# Patient Record
Sex: FEMALE | Race: WHITE | NOT HISPANIC OR LATINO | Employment: OTHER | URBAN - NONMETROPOLITAN AREA
[De-identification: names, ages, dates, MRNs, and addresses within clinical notes are randomized per-mention and may not be internally consistent; named-entity substitution may affect disease eponyms.]

---

## 2018-04-06 ENCOUNTER — APPOINTMENT (OUTPATIENT)
Dept: GENERAL RADIOLOGY | Facility: HOSPITAL | Age: 73
End: 2018-04-06

## 2018-04-06 ENCOUNTER — HOSPITAL ENCOUNTER (INPATIENT)
Facility: HOSPITAL | Age: 73
LOS: 7 days | Discharge: NURSING, REHAB OR LONG-TERM CARE | End: 2018-04-13
Attending: EMERGENCY MEDICINE | Admitting: HOSPITALIST

## 2018-04-06 ENCOUNTER — APPOINTMENT (OUTPATIENT)
Dept: CT IMAGING | Facility: HOSPITAL | Age: 73
End: 2018-04-06

## 2018-04-06 DIAGNOSIS — S42.352A CLOSED DISPLACED COMMINUTED FRACTURE OF SHAFT OF LEFT HUMERUS, INITIAL ENCOUNTER: Primary | ICD-10-CM

## 2018-04-06 DIAGNOSIS — S82.131A RIGHT MEDIAL TIBIAL PLATEAU FRACTURE, CLOSED, INITIAL ENCOUNTER: ICD-10-CM

## 2018-04-06 LAB
ABO GROUP BLD: NORMAL
ABO GROUP BLD: NORMAL
ALBUMIN SERPL-MCNC: 3.6 G/DL (ref 3.4–4.8)
ALP SERPL-CCNC: 86 U/L (ref 35–104)
ALT SERPL W P-5'-P-CCNC: 19 U/L (ref 10–36)
ANION GAP SERPL CALCULATED.3IONS-SCNC: 4.2 MMOL/L (ref 3.6–11.2)
APTT PPP: 29.6 SECONDS (ref 23.8–36.1)
AST SERPL-CCNC: 27 U/L (ref 10–30)
BACTERIA UR QL AUTO: ABNORMAL /HPF
BASOPHILS # BLD AUTO: 0.01 10*3/MM3 (ref 0–0.3)
BASOPHILS NFR BLD AUTO: 0.1 % (ref 0–2)
BILIRUB CONJ SERPL-MCNC: 0.1 MG/DL (ref 0–0.2)
BILIRUB INDIRECT SERPL-MCNC: 0.3 MG/DL
BILIRUB SERPL-MCNC: 0.4 MG/DL (ref 0.2–1.8)
BILIRUB UR QL STRIP: NEGATIVE
BLD GP AB SCN SERPL QL: NEGATIVE
BUN BLD-MCNC: 17 MG/DL (ref 7–21)
BUN/CREAT SERPL: 26.6 (ref 7–25)
CALCIUM SPEC-SCNC: 9.3 MG/DL (ref 7.7–10)
CHLORIDE SERPL-SCNC: 107 MMOL/L (ref 99–112)
CK MB SERPL-CCNC: 3.1 NG/ML (ref 0–5)
CK MB SERPL-RTO: 3.2 % (ref 0–3)
CK SERPL-CCNC: 97 U/L (ref 24–173)
CK SERPL-CCNC: 99 U/L (ref 24–173)
CLARITY UR: CLEAR
CO2 SERPL-SCNC: 25.8 MMOL/L (ref 24.3–31.9)
COLOR UR: YELLOW
CREAT BLD-MCNC: 0.64 MG/DL (ref 0.43–1.29)
DEPRECATED RDW RBC AUTO: 44 FL (ref 37–54)
EOSINOPHIL # BLD AUTO: 0 10*3/MM3 (ref 0–0.7)
EOSINOPHIL NFR BLD AUTO: 0 % (ref 0–7)
ERYTHROCYTE [DISTWIDTH] IN BLOOD BY AUTOMATED COUNT: 13.3 % (ref 11.5–14.5)
GFR SERPL CREATININE-BSD FRML MDRD: 91 ML/MIN/1.73
GLUCOSE BLD-MCNC: 109 MG/DL (ref 70–110)
GLUCOSE UR STRIP-MCNC: NEGATIVE MG/DL
HBA1C MFR BLD: 5.2 % (ref 4.5–5.7)
HCT VFR BLD AUTO: 31.1 % (ref 37–47)
HGB BLD-MCNC: 10.2 G/DL (ref 12–16)
HGB UR QL STRIP.AUTO: ABNORMAL
HYALINE CASTS UR QL AUTO: ABNORMAL /LPF
IMM GRANULOCYTES # BLD: 0.04 10*3/MM3 (ref 0–0.03)
IMM GRANULOCYTES NFR BLD: 0.2 % (ref 0–0.5)
INR PPP: 1.04 (ref 0.9–1.1)
KETONES UR QL STRIP: ABNORMAL
LEUKOCYTE ESTERASE UR QL STRIP.AUTO: NEGATIVE
LYMPHOCYTES # BLD AUTO: 0.99 10*3/MM3 (ref 1–3)
LYMPHOCYTES NFR BLD AUTO: 6.1 % (ref 16–46)
MCH RBC QN AUTO: 31.3 PG (ref 27–33)
MCHC RBC AUTO-ENTMCNC: 32.8 G/DL (ref 33–37)
MCV RBC AUTO: 95.4 FL (ref 80–94)
MONOCYTES # BLD AUTO: 0.85 10*3/MM3 (ref 0.1–0.9)
MONOCYTES NFR BLD AUTO: 5.3 % (ref 0–12)
MYOGLOBIN SERPL-MCNC: 102 NG/ML (ref 0–109)
NEUTROPHILS # BLD AUTO: 14.21 10*3/MM3 (ref 1.4–6.5)
NEUTROPHILS NFR BLD AUTO: 88.3 % (ref 40–75)
NITRITE UR QL STRIP: NEGATIVE
OSMOLALITY SERPL CALC.SUM OF ELEC: 275.9 MOSM/KG (ref 273–305)
PH UR STRIP.AUTO: <=5 [PH] (ref 5–8)
PLATELET # BLD AUTO: 353 10*3/MM3 (ref 130–400)
PMV BLD AUTO: 10.3 FL (ref 6–10)
POTASSIUM BLD-SCNC: 4.2 MMOL/L (ref 3.5–5.3)
PROT SERPL-MCNC: 6.4 G/DL (ref 6–8)
PROT UR QL STRIP: NEGATIVE
PROTHROMBIN TIME: 13.7 SECONDS (ref 11–15.4)
RBC # BLD AUTO: 3.26 10*6/MM3 (ref 4.2–5.4)
RBC # UR: ABNORMAL /HPF
REF LAB TEST METHOD: ABNORMAL
RH BLD: POSITIVE
RH BLD: POSITIVE
SODIUM BLD-SCNC: 137 MMOL/L (ref 135–153)
SP GR UR STRIP: 1.02 (ref 1–1.03)
SQUAMOUS #/AREA URNS HPF: ABNORMAL /HPF
T&S EXPIRATION DATE: NORMAL
TROPONIN I SERPL-MCNC: 0.07 NG/ML
TROPONIN I SERPL-MCNC: 0.1 NG/ML
UROBILINOGEN UR QL STRIP: ABNORMAL
WBC NRBC COR # BLD: 16.1 10*3/MM3 (ref 4.5–12.5)
WBC UR QL AUTO: ABNORMAL /HPF

## 2018-04-06 PROCEDURE — 81001 URINALYSIS AUTO W/SCOPE: CPT | Performed by: PHYSICIAN ASSISTANT

## 2018-04-06 PROCEDURE — 80048 BASIC METABOLIC PNL TOTAL CA: CPT | Performed by: PHYSICIAN ASSISTANT

## 2018-04-06 PROCEDURE — 93005 ELECTROCARDIOGRAM TRACING: CPT | Performed by: PHYSICIAN ASSISTANT

## 2018-04-06 PROCEDURE — 86850 RBC ANTIBODY SCREEN: CPT | Performed by: PHYSICIAN ASSISTANT

## 2018-04-06 PROCEDURE — 86901 BLOOD TYPING SEROLOGIC RH(D): CPT | Performed by: PHYSICIAN ASSISTANT

## 2018-04-06 PROCEDURE — 71045 X-RAY EXAM CHEST 1 VIEW: CPT | Performed by: RADIOLOGY

## 2018-04-06 PROCEDURE — 73200 CT UPPER EXTREMITY W/O DYE: CPT | Performed by: RADIOLOGY

## 2018-04-06 PROCEDURE — 84484 ASSAY OF TROPONIN QUANT: CPT | Performed by: PHYSICIAN ASSISTANT

## 2018-04-06 PROCEDURE — 86900 BLOOD TYPING SEROLOGIC ABO: CPT

## 2018-04-06 PROCEDURE — 83880 ASSAY OF NATRIURETIC PEPTIDE: CPT | Performed by: HOSPITALIST

## 2018-04-06 PROCEDURE — 73200 CT UPPER EXTREMITY W/O DYE: CPT

## 2018-04-06 PROCEDURE — 25010000002 HYDROMORPHONE PER 4 MG: Performed by: HOSPITALIST

## 2018-04-06 PROCEDURE — 25010000002 MORPHINE PER 10 MG: Performed by: EMERGENCY MEDICINE

## 2018-04-06 PROCEDURE — 82553 CREATINE MB FRACTION: CPT | Performed by: HOSPITALIST

## 2018-04-06 PROCEDURE — 83036 HEMOGLOBIN GLYCOSYLATED A1C: CPT | Performed by: HOSPITALIST

## 2018-04-06 PROCEDURE — 73030 X-RAY EXAM OF SHOULDER: CPT | Performed by: RADIOLOGY

## 2018-04-06 PROCEDURE — 85730 THROMBOPLASTIN TIME PARTIAL: CPT | Performed by: PHYSICIAN ASSISTANT

## 2018-04-06 PROCEDURE — 80076 HEPATIC FUNCTION PANEL: CPT | Performed by: HOSPITALIST

## 2018-04-06 PROCEDURE — 99223 1ST HOSP IP/OBS HIGH 75: CPT | Performed by: HOSPITALIST

## 2018-04-06 PROCEDURE — 93010 ELECTROCARDIOGRAM REPORT: CPT | Performed by: INTERNAL MEDICINE

## 2018-04-06 PROCEDURE — 82550 ASSAY OF CK (CPK): CPT | Performed by: HOSPITALIST

## 2018-04-06 PROCEDURE — 83874 ASSAY OF MYOGLOBIN: CPT | Performed by: HOSPITALIST

## 2018-04-06 PROCEDURE — 51702 INSERT TEMP BLADDER CATH: CPT

## 2018-04-06 PROCEDURE — 73030 X-RAY EXAM OF SHOULDER: CPT

## 2018-04-06 PROCEDURE — 84484 ASSAY OF TROPONIN QUANT: CPT | Performed by: HOSPITALIST

## 2018-04-06 PROCEDURE — 85610 PROTHROMBIN TIME: CPT | Performed by: PHYSICIAN ASSISTANT

## 2018-04-06 PROCEDURE — 73562 X-RAY EXAM OF KNEE 3: CPT

## 2018-04-06 PROCEDURE — 85025 COMPLETE CBC W/AUTO DIFF WBC: CPT | Performed by: PHYSICIAN ASSISTANT

## 2018-04-06 PROCEDURE — 73562 X-RAY EXAM OF KNEE 3: CPT | Performed by: RADIOLOGY

## 2018-04-06 PROCEDURE — 86900 BLOOD TYPING SEROLOGIC ABO: CPT | Performed by: PHYSICIAN ASSISTANT

## 2018-04-06 PROCEDURE — 71045 X-RAY EXAM CHEST 1 VIEW: CPT

## 2018-04-06 PROCEDURE — 99285 EMERGENCY DEPT VISIT HI MDM: CPT

## 2018-04-06 PROCEDURE — 86901 BLOOD TYPING SEROLOGIC RH(D): CPT

## 2018-04-06 RX ORDER — HYDROMORPHONE HYDROCHLORIDE 1 MG/ML
0.5 INJECTION, SOLUTION INTRAMUSCULAR; INTRAVENOUS; SUBCUTANEOUS EVERY 4 HOURS PRN
Status: DISCONTINUED | OUTPATIENT
Start: 2018-04-06 | End: 2018-04-11

## 2018-04-06 RX ORDER — MORPHINE SULFATE 2 MG/ML
2 INJECTION, SOLUTION INTRAMUSCULAR; INTRAVENOUS ONCE
Status: COMPLETED | OUTPATIENT
Start: 2018-04-06 | End: 2018-04-06

## 2018-04-06 RX ORDER — NALOXONE HCL 0.4 MG/ML
0.4 VIAL (ML) INJECTION
Status: DISCONTINUED | OUTPATIENT
Start: 2018-04-06 | End: 2018-04-13 | Stop reason: HOSPADM

## 2018-04-06 RX ORDER — ASPIRIN 325 MG
325 TABLET ORAL ONCE
Status: COMPLETED | OUTPATIENT
Start: 2018-04-06 | End: 2018-04-07

## 2018-04-06 RX ORDER — SODIUM CHLORIDE 0.9 % (FLUSH) 0.9 %
1-10 SYRINGE (ML) INJECTION AS NEEDED
Status: DISCONTINUED | OUTPATIENT
Start: 2018-04-06 | End: 2018-04-13 | Stop reason: HOSPADM

## 2018-04-06 RX ORDER — DIPHENHYDRAMINE HCL 25 MG
25 CAPSULE ORAL 2 TIMES DAILY PRN
Status: CANCELLED | OUTPATIENT
Start: 2018-04-06

## 2018-04-06 RX ORDER — NITROGLYCERIN 0.4 MG/1
0.4 TABLET SUBLINGUAL
Status: DISCONTINUED | OUTPATIENT
Start: 2018-04-06 | End: 2018-04-13 | Stop reason: HOSPADM

## 2018-04-06 RX ORDER — MULTIVITAMIN
1 TABLET ORAL DAILY
Status: ON HOLD | COMMUNITY
End: 2021-03-13

## 2018-04-06 RX ORDER — MORPHINE SULFATE 2 MG/ML
1 INJECTION, SOLUTION INTRAMUSCULAR; INTRAVENOUS EVERY 4 HOURS PRN
Status: DISCONTINUED | OUTPATIENT
Start: 2018-04-06 | End: 2018-04-06

## 2018-04-06 RX ORDER — SODIUM CHLORIDE 9 MG/ML
75 INJECTION, SOLUTION INTRAVENOUS CONTINUOUS
Status: DISCONTINUED | OUTPATIENT
Start: 2018-04-06 | End: 2018-04-07

## 2018-04-06 RX ORDER — MULTIVITAMIN
1 TABLET ORAL DAILY
Status: DISCONTINUED | OUTPATIENT
Start: 2018-04-07 | End: 2018-04-13 | Stop reason: HOSPADM

## 2018-04-06 RX ORDER — SODIUM CHLORIDE 0.9 % (FLUSH) 0.9 %
10 SYRINGE (ML) INJECTION AS NEEDED
Status: DISCONTINUED | OUTPATIENT
Start: 2018-04-06 | End: 2018-04-13 | Stop reason: HOSPADM

## 2018-04-06 RX ORDER — DIPHENHYDRAMINE HCL 25 MG
25 CAPSULE ORAL 2 TIMES DAILY PRN
Status: ON HOLD | COMMUNITY
End: 2021-03-13

## 2018-04-06 RX ADMIN — MORPHINE SULFATE 2 MG: 2 INJECTION, SOLUTION INTRAMUSCULAR; INTRAVENOUS at 19:34

## 2018-04-06 RX ADMIN — MORPHINE SULFATE 4 MG: 4 INJECTION, SOLUTION INTRAMUSCULAR; INTRAVENOUS at 17:27

## 2018-04-06 RX ADMIN — HYDROMORPHONE HYDROCHLORIDE 0.5 MG: 1 INJECTION, SOLUTION INTRAMUSCULAR; INTRAVENOUS; SUBCUTANEOUS at 23:05

## 2018-04-06 RX ADMIN — SODIUM CHLORIDE 75 ML/HR: 9 INJECTION, SOLUTION INTRAVENOUS at 17:26

## 2018-04-06 RX ADMIN — MORPHINE SULFATE 2 MG: 2 INJECTION, SOLUTION INTRAMUSCULAR; INTRAVENOUS at 14:55

## 2018-04-06 RX ADMIN — SODIUM CHLORIDE 1000 ML: 9 INJECTION, SOLUTION INTRAVENOUS at 14:55

## 2018-04-07 ENCOUNTER — APPOINTMENT (OUTPATIENT)
Dept: CT IMAGING | Facility: HOSPITAL | Age: 73
End: 2018-04-07

## 2018-04-07 ENCOUNTER — APPOINTMENT (OUTPATIENT)
Dept: GENERAL RADIOLOGY | Facility: HOSPITAL | Age: 73
End: 2018-04-07

## 2018-04-07 ENCOUNTER — APPOINTMENT (OUTPATIENT)
Dept: CARDIOLOGY | Facility: HOSPITAL | Age: 73
End: 2018-04-07
Attending: HOSPITALIST

## 2018-04-07 LAB
ALBUMIN SERPL-MCNC: 3.3 G/DL (ref 3.4–4.8)
ALBUMIN/GLOB SERPL: 1.2 G/DL (ref 1.5–2.5)
ALP SERPL-CCNC: 80 U/L (ref 35–104)
ALT SERPL W P-5'-P-CCNC: 19 U/L (ref 10–36)
ANION GAP SERPL CALCULATED.3IONS-SCNC: 8.1 MMOL/L (ref 3.6–11.2)
APTT PPP: 33.9 SECONDS (ref 23.8–36.1)
AST SERPL-CCNC: 26 U/L (ref 10–30)
BH CV ECHO MEAS - % IVS THICK: 44 %
BH CV ECHO MEAS - % LVPW THICK: 45.5 %
BH CV ECHO MEAS - ACS: 1.5 CM
BH CV ECHO MEAS - AO MAX PG: 14.7 MMHG
BH CV ECHO MEAS - AO MEAN PG: 4.3 MMHG
BH CV ECHO MEAS - AO ROOT AREA (BSA CORRECTED): 1.9
BH CV ECHO MEAS - AO ROOT AREA: 7 CM^2
BH CV ECHO MEAS - AO ROOT DIAM: 3 CM
BH CV ECHO MEAS - AO V2 MAX: 191.4 CM/SEC
BH CV ECHO MEAS - AO V2 MEAN: 93.6 CM/SEC
BH CV ECHO MEAS - AO V2 VTI: 33.9 CM
BH CV ECHO MEAS - BSA(HAYCOCK): 1.6 M^2
BH CV ECHO MEAS - BSA: 1.6 M^2
BH CV ECHO MEAS - BZI_BMI: 18.9 KILOGRAMS/M^2
BH CV ECHO MEAS - BZI_METRIC_HEIGHT: 167.6 CM
BH CV ECHO MEAS - BZI_METRIC_WEIGHT: 53.1 KG
BH CV ECHO MEAS - CONTRAST EF 4CH: 60.5 ML/M^2
BH CV ECHO MEAS - EDV(CUBED): 106.5 ML
BH CV ECHO MEAS - EDV(MOD-SP4): 38 ML
BH CV ECHO MEAS - EDV(TEICH): 104.4 ML
BH CV ECHO MEAS - EF(CUBED): 55.6 %
BH CV ECHO MEAS - EF(TEICH): 47.4 %
BH CV ECHO MEAS - ESV(CUBED): 47.2 ML
BH CV ECHO MEAS - ESV(MOD-SP4): 15 ML
BH CV ECHO MEAS - ESV(TEICH): 55 ML
BH CV ECHO MEAS - FS: 23.7 %
BH CV ECHO MEAS - IVS/LVPW: 0.6
BH CV ECHO MEAS - IVSD: 0.51 CM
BH CV ECHO MEAS - IVSS: 0.73 CM
BH CV ECHO MEAS - LV DIASTOLIC VOL/BSA (35-75): 23.9 ML/M^2
BH CV ECHO MEAS - LV MASS(C)D: 99.6 GRAMS
BH CV ECHO MEAS - LV MASS(C)DI: 62.5 GRAMS/M^2
BH CV ECHO MEAS - LV MASS(C)S: 104.8 GRAMS
BH CV ECHO MEAS - LV MASS(C)SI: 65.8 GRAMS/M^2
BH CV ECHO MEAS - LV SYSTOLIC VOL/BSA (12-30): 9.4 ML/M^2
BH CV ECHO MEAS - LVIDD: 4.7 CM
BH CV ECHO MEAS - LVIDS: 3.6 CM
BH CV ECHO MEAS - LVLD AP4: 4.6 CM
BH CV ECHO MEAS - LVLS AP4: 4.7 CM
BH CV ECHO MEAS - LVPWD: 0.84 CM
BH CV ECHO MEAS - LVPWS: 1.2 CM
BH CV ECHO MEAS - MV A MAX VEL: 100.7 CM/SEC
BH CV ECHO MEAS - MV E MAX VEL: 89.8 CM/SEC
BH CV ECHO MEAS - MV E/A: 0.89
BH CV ECHO MEAS - PA ACC SLOPE: 822.4 CM/SEC^2
BH CV ECHO MEAS - PA ACC TIME: 0.11 SEC
BH CV ECHO MEAS - PA PR(ACCEL): 31.5 MMHG
BH CV ECHO MEAS - RVDD: 0.89 CM
BH CV ECHO MEAS - SI(AO): 148.4 ML/M^2
BH CV ECHO MEAS - SI(CUBED): 37.2 ML/M^2
BH CV ECHO MEAS - SI(MOD-SP4): 14.4 ML/M^2
BH CV ECHO MEAS - SI(TEICH): 31 ML/M^2
BH CV ECHO MEAS - SV(AO): 236.4 ML
BH CV ECHO MEAS - SV(CUBED): 59.3 ML
BH CV ECHO MEAS - SV(MOD-SP4): 23 ML
BH CV ECHO MEAS - SV(TEICH): 49.4 ML
BILIRUB SERPL-MCNC: 0.4 MG/DL (ref 0.2–1.8)
BNP SERPL-MCNC: 71 PG/ML (ref 0–100)
BUN BLD-MCNC: 18 MG/DL (ref 7–21)
BUN/CREAT SERPL: 28.1 (ref 7–25)
CALCIUM SPEC-SCNC: 9 MG/DL (ref 7.7–10)
CHLORIDE SERPL-SCNC: 108 MMOL/L (ref 99–112)
CHOLEST SERPL-MCNC: 123 MG/DL (ref 0–200)
CK MB SERPL-CCNC: 2.45 NG/ML (ref 0–5)
CK MB SERPL-CCNC: 3.04 NG/ML (ref 0–5)
CK MB SERPL-RTO: 2.2 % (ref 0–3)
CK MB SERPL-RTO: 2.7 % (ref 0–3)
CK SERPL-CCNC: 112 U/L (ref 24–173)
CK SERPL-CCNC: 112 U/L (ref 24–173)
CO2 SERPL-SCNC: 19.9 MMOL/L (ref 24.3–31.9)
CREAT BLD-MCNC: 0.64 MG/DL (ref 0.43–1.29)
DEPRECATED RDW RBC AUTO: 44.8 FL (ref 37–54)
ERYTHROCYTE [DISTWIDTH] IN BLOOD BY AUTOMATED COUNT: 13.6 % (ref 11.5–14.5)
GFR SERPL CREATININE-BSD FRML MDRD: 91 ML/MIN/1.73
GLOBULIN UR ELPH-MCNC: 2.8 GM/DL
GLUCOSE BLD-MCNC: 113 MG/DL (ref 70–110)
GLUCOSE BLDC GLUCOMTR-MCNC: 75 MG/DL (ref 70–130)
HCT VFR BLD AUTO: 30.1 % (ref 37–47)
HDLC SERPL-MCNC: 47 MG/DL (ref 60–100)
HGB BLD-MCNC: 9.6 G/DL (ref 12–16)
HYPOCHROMIA BLD QL: ABNORMAL
INR PPP: 1.14 (ref 0.9–1.1)
LDLC SERPL CALC-MCNC: 68 MG/DL (ref 0–100)
LDLC/HDLC SERPL: 1.44 {RATIO}
LYMPHOCYTES # BLD MANUAL: 1.52 10*3/MM3 (ref 1–3)
LYMPHOCYTES NFR BLD MANUAL: 13 % (ref 16–46)
LYMPHOCYTES NFR BLD MANUAL: 7 % (ref 0–12)
MACROCYTES BLD QL SMEAR: ABNORMAL
MAXIMAL PREDICTED HEART RATE: 149 BPM
MCH RBC QN AUTO: 31.1 PG (ref 27–33)
MCHC RBC AUTO-ENTMCNC: 31.9 G/DL (ref 33–37)
MCV RBC AUTO: 97.4 FL (ref 80–94)
MONOCYTES # BLD AUTO: 0.82 10*3/MM3 (ref 0.1–0.9)
MYOGLOBIN SERPL-MCNC: 58 NG/ML (ref 0–109)
MYOGLOBIN SERPL-MCNC: 96 NG/ML (ref 0–109)
NEUTROPHILS # BLD AUTO: 9.38 10*3/MM3 (ref 1.4–6.5)
NEUTROPHILS NFR BLD MANUAL: 80 % (ref 40–75)
OSMOLALITY SERPL CALC.SUM OF ELEC: 274.7 MOSM/KG (ref 273–305)
PLAT MORPH BLD: NORMAL
PLATELET # BLD AUTO: 330 10*3/MM3 (ref 130–400)
PMV BLD AUTO: 10.4 FL (ref 6–10)
POTASSIUM BLD-SCNC: 4 MMOL/L (ref 3.5–5.3)
PROT SERPL-MCNC: 6.1 G/DL (ref 6–8)
PROTHROMBIN TIME: 14.7 SECONDS (ref 11–15.4)
RBC # BLD AUTO: 3.09 10*6/MM3 (ref 4.2–5.4)
SODIUM BLD-SCNC: 136 MMOL/L (ref 135–153)
STRESS TARGET HR: 127 BPM
TRIGL SERPL-MCNC: 41 MG/DL (ref 0–150)
TROPONIN I SERPL-MCNC: 0.13 NG/ML
TROPONIN I SERPL-MCNC: 0.16 NG/ML
VLDLC SERPL-MCNC: 8.2 MG/DL
WBC NRBC COR # BLD: 11.72 10*3/MM3 (ref 4.5–12.5)

## 2018-04-07 PROCEDURE — 85007 BL SMEAR W/DIFF WBC COUNT: CPT | Performed by: HOSPITALIST

## 2018-04-07 PROCEDURE — 82550 ASSAY OF CK (CPK): CPT | Performed by: HOSPITALIST

## 2018-04-07 PROCEDURE — 85610 PROTHROMBIN TIME: CPT | Performed by: HOSPITALIST

## 2018-04-07 PROCEDURE — 84484 ASSAY OF TROPONIN QUANT: CPT | Performed by: HOSPITALIST

## 2018-04-07 PROCEDURE — 25010000002 HYDROMORPHONE PER 4 MG: Performed by: HOSPITALIST

## 2018-04-07 PROCEDURE — 83874 ASSAY OF MYOGLOBIN: CPT | Performed by: HOSPITALIST

## 2018-04-07 PROCEDURE — 73610 X-RAY EXAM OF ANKLE: CPT

## 2018-04-07 PROCEDURE — 73700 CT LOWER EXTREMITY W/O DYE: CPT

## 2018-04-07 PROCEDURE — 82962 GLUCOSE BLOOD TEST: CPT

## 2018-04-07 PROCEDURE — 93306 TTE W/DOPPLER COMPLETE: CPT

## 2018-04-07 PROCEDURE — 82553 CREATINE MB FRACTION: CPT | Performed by: HOSPITALIST

## 2018-04-07 PROCEDURE — 99222 1ST HOSP IP/OBS MODERATE 55: CPT | Performed by: INTERNAL MEDICINE

## 2018-04-07 PROCEDURE — 73700 CT LOWER EXTREMITY W/O DYE: CPT | Performed by: RADIOLOGY

## 2018-04-07 PROCEDURE — 80061 LIPID PANEL: CPT | Performed by: HOSPITALIST

## 2018-04-07 PROCEDURE — 94799 UNLISTED PULMONARY SVC/PX: CPT

## 2018-04-07 PROCEDURE — 85730 THROMBOPLASTIN TIME PARTIAL: CPT | Performed by: HOSPITALIST

## 2018-04-07 PROCEDURE — 93308 TTE F-UP OR LMTD: CPT | Performed by: INTERNAL MEDICINE

## 2018-04-07 PROCEDURE — 85027 COMPLETE CBC AUTOMATED: CPT | Performed by: HOSPITALIST

## 2018-04-07 PROCEDURE — 73610 X-RAY EXAM OF ANKLE: CPT | Performed by: RADIOLOGY

## 2018-04-07 PROCEDURE — 25010000002 ONDANSETRON PER 1 MG: Performed by: HOSPITALIST

## 2018-04-07 PROCEDURE — 80053 COMPREHEN METABOLIC PANEL: CPT | Performed by: HOSPITALIST

## 2018-04-07 PROCEDURE — 99233 SBSQ HOSP IP/OBS HIGH 50: CPT | Performed by: INTERNAL MEDICINE

## 2018-04-07 PROCEDURE — 25010000002 ENOXAPARIN PER 10 MG: Performed by: INTERNAL MEDICINE

## 2018-04-07 RX ORDER — ONDANSETRON 2 MG/ML
4 INJECTION INTRAMUSCULAR; INTRAVENOUS EVERY 6 HOURS PRN
Status: DISCONTINUED | OUTPATIENT
Start: 2018-04-07 | End: 2018-04-13 | Stop reason: HOSPADM

## 2018-04-07 RX ORDER — DOXYCYCLINE 100 MG/1
100 CAPSULE ORAL EVERY 12 HOURS SCHEDULED
Status: DISCONTINUED | OUTPATIENT
Start: 2018-04-07 | End: 2018-04-13 | Stop reason: HOSPADM

## 2018-04-07 RX ADMIN — DOXYCYCLINE 100 MG: 100 CAPSULE ORAL at 14:30

## 2018-04-07 RX ADMIN — HYDROMORPHONE HYDROCHLORIDE 0.5 MG: 1 INJECTION, SOLUTION INTRAMUSCULAR; INTRAVENOUS; SUBCUTANEOUS at 14:29

## 2018-04-07 RX ADMIN — HYDROMORPHONE HYDROCHLORIDE 0.5 MG: 1 INJECTION, SOLUTION INTRAMUSCULAR; INTRAVENOUS; SUBCUTANEOUS at 04:27

## 2018-04-07 RX ADMIN — SODIUM CHLORIDE 75 ML/HR: 9 INJECTION, SOLUTION INTRAVENOUS at 04:31

## 2018-04-07 RX ADMIN — ASPIRIN 325 MG: 325 TABLET ORAL at 01:29

## 2018-04-07 RX ADMIN — ONDANSETRON 4 MG: 2 INJECTION INTRAMUSCULAR; INTRAVENOUS at 22:31

## 2018-04-07 RX ADMIN — ENOXAPARIN SODIUM 40 MG: 40 INJECTION SUBCUTANEOUS at 14:30

## 2018-04-07 RX ADMIN — HYDROMORPHONE HYDROCHLORIDE 0.5 MG: 1 INJECTION, SOLUTION INTRAMUSCULAR; INTRAVENOUS; SUBCUTANEOUS at 18:26

## 2018-04-07 RX ADMIN — HYDROMORPHONE HYDROCHLORIDE 0.5 MG: 1 INJECTION, SOLUTION INTRAMUSCULAR; INTRAVENOUS; SUBCUTANEOUS at 08:47

## 2018-04-07 RX ADMIN — HYDROMORPHONE HYDROCHLORIDE 0.5 MG: 1 INJECTION, SOLUTION INTRAMUSCULAR; INTRAVENOUS; SUBCUTANEOUS at 22:31

## 2018-04-07 NOTE — PLAN OF CARE
Problem: Fall Risk (Adult)  Goal: Identify Related Risk Factors and Signs and Symptoms  Outcome: Ongoing (interventions implemented as appropriate)   04/07/18 0102   Fall Risk (Adult)   Related Risk Factors (Fall Risk) age-related changes;environment unfamiliar;history of falls   Signs and Symptoms (Fall Risk) presence of risk factors     Goal: Absence of Fall  Outcome: Ongoing (interventions implemented as appropriate)   04/07/18 0102   Fall Risk (Adult)   Absence of Fall making progress toward outcome

## 2018-04-07 NOTE — PROGRESS NOTES
Discharge Planning Assessment   [place]     Patient Name: Debra Resendiz  MRN: [id number]  Today's Date: 4/7/2018    Admit Date: 4/6/2018          Discharge Needs Assessment     Row Name 04/07/18 0852       Living Environment    Lives With child(titus), adult   Pt lives at home with his son, Hernesto Resendiz and is requesting rehab at discharge.     Current Living Arrangements home/apartment/condo    Primary Care Provided by self    Provides Primary Care For no one    Family Caregiver if Needed child(titus), adult   Son, Diaz Resendiz     Quality of Family Relationships helpful;involved;supportive    Living Arrangement Comments Pt is requesting  Rehab at discharge.        Resource/Environmental Concerns    Resource/Environmental Concerns none    Transportation Concerns car, none       Transition Planning    Patient/Family Anticipates Transition to inpatient rehabilitation facility    Physical Rehab    Patient/Family Anticipated Services at Transition other (see comments)       Transportation Anticipated family or friend will provide       Discharge Needs Assessment    Concerns to be Addressed no discharge needs identified    Equipment Currently Used at Home walker, rolling   Unknown- Pt bought when she broke her hip.     Anticipated Changes Related to Illness inability to care for self    Outpatient/Agency/Support Group Needs inpatient rehabilitation facility    Discharge Facility/Level of Care Needs rehabilitation facility            Discharge Plan     Row Name 04/07/18 0855       Plan    Plan Pt lives at home with her son, Diaz Resendiz and is requesting  physical rehab once she is medically stable.  Pt does not have home heatlh.  Pt has a rolling walker. Pt is normally mobile and I with ADl's but states she had a fall.  Pt does not have a POA or Advance Directive. Pt declines at this time and states her son, Diaz will make all the decisions.  Pt fills her prescriptions at [facility].  SS will  contact Rehab on Monday regarding potential consult.  SS will continue to follow.     Patient/Family in Agreement with Plan yes                Demographic Summary     Row Name 04/07/18 0848       General Information    Admission Type inpatient    Reason for Consult discharge planning   Possible home health physical therapy    Preferred Language English     Used During This Interaction no            Functional Status     Row Name 04/07/18 0852       Functional Status    Usual Activity Tolerance good    Current Activity Tolerance poor        Nga Rand

## 2018-04-07 NOTE — ED PROVIDER NOTES
Subjective     History provided by:  Patient   used: No    Fall   Mechanism of injury: fall    Injury location:  Shoulder/arm and leg  Shoulder/arm injury location:  L shoulder  Leg injury location:  R knee  Incident location:  Home (out in the yard)  Time since incident:  1 hour  Arrived directly from scene: yes    Fall:     Fall occurred:  Tripped and walking (slipped in the wet grass)    Impact surface:  Grass    Point of impact: left shoulder, right knee.  Tetanus status:  Up to date  Prior to arrival data:     Orientation at scene:  Person, place, time and situation    Loss of consciousness: no      Amnesic to event: no      Immobilization:  LUE splint  Associated symptoms: no abdominal pain, no back pain, no chest pain, no headaches, no loss of consciousness, no nausea, no neck pain and no vomiting    Risk factors: no anticoagulation therapy, no CAD, no diabetes and no past MI        Review of Systems   Constitutional: Negative for activity change and fever.   HENT: Negative for congestion, rhinorrhea and sore throat.    Eyes: Negative for pain.   Respiratory: Negative for cough, shortness of breath and wheezing.    Cardiovascular: Negative for chest pain.   Gastrointestinal: Negative for abdominal distention, abdominal pain, diarrhea, nausea and vomiting.   Genitourinary: Negative for difficulty urinating and dysuria.   Musculoskeletal: Negative for arthralgias, back pain, myalgias and neck pain.   Skin: Negative for rash and wound.   Neurological: Negative for dizziness, loss of consciousness and headaches.   Psychiatric/Behavioral: Negative for agitation.   All other systems reviewed and are negative.      History reviewed. No pertinent past medical history.    Allergies   Allergen Reactions   • Penicillins Hives and Itching       Past Surgical History:   Procedure Laterality Date   • HIP SURGERY         History reviewed. No pertinent family history.    Social History     Social History    • Marital status:      Social History Main Topics   • Smoking status: Former Smoker   • Alcohol use No   • Drug use: No     Other Topics Concern   • Not on file           Objective   Physical Exam   Constitutional: She is oriented to person, place, and time. She appears well-developed and well-nourished.   HENT:   Head: Normocephalic and atraumatic.   Eyes: EOM are normal. Pupils are equal, round, and reactive to light.   Neck: Normal range of motion. Neck supple.   Cardiovascular: Normal rate, regular rhythm and normal heart sounds.    Pulmonary/Chest: Effort normal and breath sounds normal.   Abdominal: Soft. Bowel sounds are normal.   Musculoskeletal:        Left shoulder: She exhibits decreased range of motion, tenderness, deformity and pain.        Right knee: She exhibits decreased range of motion. Tenderness found.   Neurological: She is alert and oriented to person, place, and time.   Skin: Skin is warm and dry.   Psychiatric: She has a normal mood and affect. Her behavior is normal. Judgment and thought content normal.   Nursing note and vitals reviewed.      Splint - Cast - Strapping  Date/Time: 4/6/2018 9:21 PM  Performed by: BATSHEVA JOVEL  Authorized by: JAYLIN SANDERS     Consent:     Consent obtained:  Verbal    Consent given by:  Patient    Risks discussed:  Discoloration, numbness, pain and swelling    Alternatives discussed:  No treatment  Pre-procedure details:     Sensation:  Normal  Procedure details:     Laterality:  Left    Location:  Shoulder    Shoulder:  L shoulder    Supplies used: sling and swath.  Post-procedure details:     Pain:  Improved    Sensation:  Normal    Patient tolerance of procedure:  Tolerated well, no immediate complications  Splint - Cast - Strapping  Date/Time: 4/6/2018 9:21 PM  Performed by: BATSHEVA JOVEL  Authorized by: JAYLIN SANDERS     Consent:     Consent obtained:  Verbal    Consent given by:  Patient    Risks discussed:   Discoloration, pain, swelling and numbness    Alternatives discussed:  No treatment  Procedure details:     Laterality:  Right    Location:  Knee    Knee:  R knee    Splint type:  Knee immobilizer    Supplies:  Prefabricated splint  Post-procedure details:     Pain:  Improved    Sensation:  Normal    Patient tolerance of procedure:  Tolerated well, no immediate complications             ED Course  ED Course   Comment By Time   Paged Dr. Aburto.  CRISSY Johnson 04/06 1603   Spoke with Dr. Aburto, he will review images and then return phone call.  CRISSY Johnson 04/06 1626   Discussed with Dr. Aburto. Requests a CT of shoulder to further evaluate potential need for surgery emergently vs outpatient.  CRISSY Johnson 04/06 1645   D/w Dr. Aburto. Sling and swath shoulder, knee immobilizer, and admit to hospitalist with him consult, NPO after midnight. Will get pre-op labs and ekg before consulting hospitalist.  CRISSY Johnson 04/06 1848   D/w Dr. Andrade; pending the rest of the labs, plan is to accept for admission with surgery in the AM.  CRISSY Johnson 04/06 1959   Dr. Andrade accepting for admission.  CRISSY Johnson 04/06 2011                  MDM  Number of Diagnoses or Management Options  Closed displaced comminuted fracture of shaft of left humerus, initial encounter: new and requires workup  Right medial tibial plateau fracture, closed, initial encounter:      Amount and/or Complexity of Data Reviewed  Clinical lab tests: ordered and reviewed  Tests in the radiology section of CPT®: ordered and reviewed  Tests in the medicine section of CPT®: reviewed and ordered  Independent visualization of images, tracings, or specimens: yes    Patient Progress  Patient progress: stable      Final diagnoses:   Closed displaced comminuted fracture of shaft of left humerus, initial encounter   Right medial tibial plateau fracture, closed, initial encounter            CRISSY Johnson  04/06/18 9997

## 2018-04-07 NOTE — PLAN OF CARE
Problem: Fall Risk (Adult)  Goal: Identify Related Risk Factors and Signs and Symptoms  Outcome: Ongoing (interventions implemented as appropriate)    Goal: Absence of Fall  Outcome: Ongoing (interventions implemented as appropriate)      Problem: Pain, Acute (Adult)  Goal: Identify Related Risk Factors and Signs and Symptoms  Outcome: Ongoing (interventions implemented as appropriate)    Goal: Acceptable Pain Control/Comfort Level  Outcome: Ongoing (interventions implemented as appropriate)      Problem: Fracture Orthopaedic (Adult)  Goal: Signs and Symptoms of Listed Potential Problems Will be Absent, Minimized or Managed (Fracture Orthopaedic)  Outcome: Ongoing (interventions implemented as appropriate)      Problem: Patient Care Overview  Goal: Plan of Care Review  Outcome: Ongoing (interventions implemented as appropriate)

## 2018-04-07 NOTE — PROGRESS NOTES
"  I have seen the patient in conjunction with Vira ENGLISH and son    History was from the patient as well as the patient's son    History of presenting illness:  Patient yesterday slipped down a hill in a yard.  She had immediate pain in the left shoulder as well as the right knee.  She states this morning the lateral surface of the knee.  Worse with any attempted movement.  Sharp in character.  She's had no associated paresthesias.  His began with the fall.  She was found to have a left humerus fracture and a right tibial plateau fracture.  Patient has not seen a doctor many years.  She has no known significant medical history.  She does state when asked about chest pain she occasionally gets a chest \"flutter\".  She does not remember having that yesterday around the time of the fall but did have one episode of that this morning.  She's not having this currently.  I noticed that she did have a resting tremor at times.  Her son states he is noticed this as well.  When I asked about shuffling gait he states that she did shuffle.    Vital Signs  Temp:  [97.1 °F (36.2 °C)-98.4 °F (36.9 °C)] 97.9 °F (36.6 °C)  Heart Rate:  [] 90  Resp:  [16-20] 18  BP: (137-153)/(56-74) 145/56  Body mass index is 18.96 kg/m².      Intake/Output Summary (Last 24 hours) at 04/07/18 1304  Last data filed at 04/07/18 0500   Gross per 24 hour   Intake             2185 ml   Output              750 ml   Net             1435 ml     Intake & Output (last 3 days)       04/04 0701 - 04/05 0700 04/05 0701 - 04/06 0700 04/06 0701 - 04/07 0700 04/07 0701 - 04/08 0700    P.O.   200     I.V. (mL/kg)   985 (18.5)     IV Piggyback   1000     Total Intake(mL/kg)   2185 (41)     Urine (mL/kg/hr)   750     Total Output     750      Net     +1435                    Physical exam:  Physical Exam   Constitutional: Well-developed, well-nourished.  Pleasant.  No distress  Head: Normocephalic and atraumatic.  Hearing is intact, mucous membranes are moist.She " appears to have a masked face.  Eyes:  Pupils are equal, round, and reactive to light. No scleral icterus.   Neck: Normal range of motion. Neck supple.   Cardiovascular: Normal rate, regular rhythm and normal heart sounds.  No murmur rub or gallop  Pulmonary/Chest: Bilateral breath sounds no rhonchus rales or wheezing heard  (Cardiovascular exam and pulmonary exam limited due to the patient's shoulder mobilizer)  No carotid bruits  Abdominal: Soft, flat, bowel sounds are active, nondistended nontender.  No peritoneal signs   Musculoskeletal: Upper extremity strength is symmetric, did not attempt to move her right leg.  She can move her left fingers well.  Her left shoulder is an immobilizer.  She has excellent palpable distal pulses, SCDs are in place.  She is in the right knee immobilizer.  She does have some mild cogwheeling of her upper extremities.  Neurological: Nonfocal, alert.  Sensation intact in the lower extremities.  Skin: Skin is warm and dry.   Psychiatric:  Normal mood and affect.     EKG: To my viewing overall appears fairly normal      Results Review:  Lab Results   Component Value Date    WBC 11.72 04/07/2018    HGB 9.6 (L) 04/07/2018    HCT 30.1 (L) 04/07/2018    MCV 97.4 (H) 04/07/2018     04/07/2018     Lab Results   Component Value Date    GLUCOSE 113 (H) 04/07/2018    BUN 18 04/07/2018    CREATININE 0.64 04/07/2018    EGFRIFNONA 91 04/07/2018    BCR 28.1 (H) 04/07/2018    CO2 19.9 (L) 04/07/2018    CALCIUM 9.0 04/07/2018    ALBUMIN 3.30 (L) 04/07/2018    LABIL2 1.2 (L) 04/07/2018    AST 26 04/07/2018    ALT 19 04/07/2018       Imaging Results (last 72 hours)     Procedure Component Value Units Date/Time    CT Upper Extremity Left Without Contrast [[id number]] Collected:  04/07/18 0737     Updated:  04/07/18 0740    Narrative:       EXAMINATION: CT UPPER EXTREMITY LEFT WO CONTRAST-      CLINICAL INDICATION:fall        TECHNIQUE: Multiple axial CT images were obtained through the  LEFT  SHOULDER without IV contrast administration.  The axial CT data set was  used to generate high resolution reformatted images in the coronal and  sagittal planes to facilitate diagnostic accuracy and treatment  planning.      Radiation dose reduction techniques were utilized per ALARA protocol.  Automated exposure control was initiated through either or [employer] or  D[employer]Right software packages by  protocol.       169.74 mGy.cm     COMPARISON: X-ray from same day      FINDINGS:  Comminuted fracture is again noted of the left humeral neck. Humeral  head remains well located. There is minimal anterior displacement of the  distal fracture fragments in relation to the proximal humeral head  fracture fragments.  No dislocation.  Degenerative changes noted including calcific tendinosis and AC joint  arthropathy.  Localized soft tissue edema and swelling is noted.  Partial imaging of the left lung demonstrates centrilobular nodularity  throughout most consistent with atypical pneumonia.       Impression:          1. Minimally displaced comminuted left humeral neck fracture as detailed  above.  2. Probable atypical pneumonia seen of the partially imaged left lung.     This report was finalized on 4/7/2018 7:38 AM by Dr. Viraj Appiah MD.       XR Chest 1 View [[id number]] Collected:  04/07/18 0735     Updated:  04/07/18 0738    Narrative:       EXAMINATION: XR CHEST 1 VW-      CLINICAL INDICATION:     pre-op     TECHNIQUE:  XR CHEST 1 VW-      COMPARISON: NONE      FINDINGS:   Chronic appearing interstitial lung changes.   Heart and mediastinal contours are unremarkable.   No pneumothorax.   No pleural effusion.   Left humerus neck fracture again noted.            Impression:       Chronic appearing interstitial lung changes. Otherwise no  acute cardiopulmonary findings.     This report was finalized on 4/7/2018 7:36 AM by Dr. Viraj Appiah MD.       XR Shoulder 2+ View Left [[id number]] Collected:  04/06/18  "1523     Updated:  04/06/18 1623    Narrative:       EXAMINATION: XR SHOULDER 2+ VW LEFT-      CLINICAL INDICATION:fall        COMPARISON: None      TECHNIQUE: 2 views left shoulder     FINDINGS:   Left humerus neck fracture. Angulation without significant displacement.            Impression:       Angulated but nondisplaced left humeral neck fracture.     This report was finalized on 4/6/2018 3:24 PM by Dr. Viraj Appiah MD.       XR Knee 3 View Right [[id number]] Collected:  04/06/18 1524     Updated:  04/06/18 1623    Narrative:       EXAMINATION: XR KNEE 3 VW RIGHT-      CLINICAL INDICATION:fall        COMPARISON: None      TECHNIQUE: 3 views right knee     FINDINGS:   Fat fluid level suprapatellar bursa indicates lipohemarthrosis. Medial  tibial plateau fracture not displaced. No dislocation.           Impression:       Nondisplaced medial tibial plateau fracture with associated  lipohemarthrosis.     This report was finalized on 4/6/2018 3:24 PM by Dr. Viraj Appiah MD.            CT images reviewed, minimal findings        Assessment/Plan     Active Problems:  Closed displaced comminuted fracture of shaft of left humerusAnd right tibial plateau fracture.  I discussed with  , and he plans on fixing this tomorrow if okay with cardiology.  Continue Dilaudid when necessary    Gray zone troponin, she does have occasional chest \"fluttering\", echocardiogram shows a normal ejection fraction.  I've explained to the patient patient's family that anyone is at some heart risk for surgery but I've asked cardiology to evaluate her prior to undergoing general anesthesia.  Await his input.    Possible atypical pneumonia but a room air saturation is good.  White count  was elevated but this may be related to her trauma.  I will treat her with 7 days of doxycycline therapy.    Possible early parkinsonism with cogwheeling, shuffling gait by history and a masked face.  I've asked neurology to evaluate.    Macrocytic " anemia.  Will check anemia studies.    DVT prophylaxis, since no surgery planned, will give 1 dose of subcutaneous Lovenox today.      Sang Epps MD  04/07/18  1:04 PM

## 2018-04-07 NOTE — CONSULTS
Patient Name: Debra Resendiz  Age/Sex: 72 y.o. female  : 1945  MRN: [id number]    Date of Hospital Visit: 2018  Encounter Provider: Clint Marroquin MD  Referring Provider: No ref. provider found         Subjective:       Chief Complaint: Preoperative risk assessment    History of Present Illness:  Debra Resendiz is a 72 y.o. female without any cardiac history and no history chest pain is in with a ground level fall and fractures in need of repair. No syncope. EKG trace detectable and flat and now decreasing. No hx CHF or arrhythmia. Echo with nml LVSF. DEnies HTN, XOL, DM. +FHX heart disease. No prior cardiac testing. CXR without CHF and BNP nml. Pt ambulatory prior to admission.      Past Medical History:  History reviewed. No pertinent past medical history.    Past Surgical History:   Procedure Laterality Date   • HIP SURGERY         Home Medications:    Prescriptions Prior to Admission   Medication Sig Dispense Refill Last Dose   • diphenhydrAMINE (BENADRYL) 25 mg capsule Take 25 mg by mouth 2 (Two) Times a Day As Needed for Itching, Allergies or Sleep.   Past Month at Unknown time   • multivitamin (DAILY RED) tablet tablet Take 1 tablet by mouth Daily.   2018 at am       Allergies:  Allergies   Allergen Reactions   • Penicillins Hives and Itching       Past Social History:  Social History     Social History   • Marital status:      Social History Main Topics   • Smoking status: Former Smoker   • Alcohol use No   • Drug use: No     Other Topics Concern   • Not on file       Past Family History:  History reviewed. No pertinent family history.    Review of Systems:   Constitution: No chills, no rigors, no unexplained weight loss or weight gain  Eyes:  No diplopia, no blurred vision, no loss of vision, conjunctiva is pink and sclera is anicteric  ENT:  No tinnitus, no otorrhea, no epistaxis, no sore throat   Respiratory: No cough, no hemoptysis  Cardiovascular: see HPI  Gastrointestinal:  No nausea, no vomiting, no hematemesis, no diarrhea or constipation, no melena  Genitourinary: No frequency of dysuria no hematuria  Integument: No pruritis and  no skin rash  Hematologic / Lymphatic: No excessive bleeding, easy bruising, fatigue, lymphadenopathy and petechiae  Musculoskeletal: No joint pain, joint stiffness, joint swelling, muscle pain, muscle weakness and neck pain  Neurological: No dizziness, headaches, light headedness, seizures and vertigo  Endocrine: No frequent urination and nocturia, temperature intolerance, weight gain, unintended and weight loss, unintended      Objective:     Objective:  Vital Signs (last 24 hours)       04/06 0700  -  04/07 0659 04/07 0700  -  04/07 1436   Most Recent    Temp (°F) 97.1 -  98.4      97.9     97.9 (36.6)    Heart Rate 86 -  112      90     90    Resp 16 -  20      18     18    /64 -  153/70      145/56     145/56    SpO2 (%) 94 -  98      96     96          Body mass index is 18.96 kg/m².  Weight change:           PHYSICAL EXAM:    General: Alert and oriented, no acute distress  Head: Normocephalic, without obvious abnormality, atraumatic  Neck: Supple, trachea midline, no JVD  Lungs: Clear to auscultation,respirations regular, even and unlabored  Heart: Regular rate and rhythm, normal S1 and S2, no rub, murmur, or gallop,    Chest wall: No abnormalities observed  Abdomen:Normal bowel sounds,  non-distended  Extremities:Moves all extremities well, no edema, no cyanosis, no redness  Pulses: Pulses palpable and equal bilaterally  Skin: No bleeding, bruising or rash  Neurologic: A & O x 3     Access:    Lab Review:       Results from last 7 days  Lab Units 04/07/18  0208   SODIUM mmol/L 136   POTASSIUM mmol/L 4.0   CHLORIDE mmol/L 108   CO2 mmol/L 19.9*   BUN mg/dL 18   CREATININE mg/dL 0.64   CALCIUM mg/dL 9.0   BILIRUBIN mg/dL 0.4   ALK PHOS U/L 80   ALT (SGPT) U/L 19   AST (SGOT) U/L 26   GLUCOSE mg/dL 113*       Results from last 7 days  Lab Units  04/07/18  0926 04/07/18  0208 04/06/18  2120   CK TOTAL U/L 112 112 97   TROPONIN I ng/mL 0.131* 0.158* 0.101*   CK MB INDEX % 2.2 2.7 3.2*       Results from last 7 days  Lab Units 04/06/18  1920   BNP pg/mL 71.0       Results from last 7 days  Lab Units 04/07/18  0207   WBC 10*3/mm3 11.72   HEMOGLOBIN g/dL 9.6*   HEMATOCRIT % 30.1*   PLATELETS 10*3/mm3 330       Results from last 7 days  Lab Units 04/07/18  0536 04/06/18  1920   INR  1.14* 1.04   APTT seconds 33.9 29.6           Results from last 7 days  Lab Units 04/07/18  0536   CHOLESTEROL mg/dL 123   TRIGLYCERIDES mg/dL 41   HDL CHOL mg/dL 47*   LDL CHOL mg/dL 68           Invalid input(s):  T4,  FREET4    EKG:   ECG/EMG Results (last 24 hours)     Procedure Component Value Units Date/Time    Adult Transthoracic Echo Complete W/ Cont if Necessary Per Protocol [[id number]] Collected:  04/07/18 1002     Updated:  04/07/18 1049     BSA 1.6 m^2       CV ECHO KAREEM - RVDD 0.89 cm      IVSd 0.51 cm      IVSs 0.73 cm      LVIDd 4.7 cm      LVIDs 3.6 cm      LVPWd 0.84 cm       CV ECHO KAREEM - LVPWS 1.2 cm      IVS/LVPW 0.6     FS 23.7 %      EDV(Teich) 104.4 ml      ESV(Teich) 55.0 ml      EF(Teich) 47.4 %      EDV(cubed) 106.5 ml      ESV(cubed) 47.2 ml      EF(cubed) 55.6 %      % IVS thick 44.0 %      % LVPW thick 45.5 %      LV mass(C)d 99.6 grams      LV mass(C)dI 62.5 grams/m^2      LV mass(C)s 104.8 grams      LV mass(C)sI 65.8 grams/m^2      SV(Teich) 49.4 ml      SI(Teich) 31.0 ml/m^2      SV(cubed) 59.3 ml      SI(cubed) 37.2 ml/m^2      Ao root diam 3.0 cm      Ao root area 7.0 cm^2      ACS 1.5 cm      LVLd ap4 4.6 cm      EDV(MOD-sp4) 38.0 ml      LVLs ap4 4.7 cm      ESV(MOD-sp4) 15.0 ml      SV(MOD-sp4) 23.0 ml      SI(MOD-sp4) 14.4 ml/m^2      Ao root area (BSA corrected) 1.9     CONTRAST EF 4CH 60.5 ml/m^2      LV Diastolic corrected for BSA 23.9 ml/m^2      LV Systolic corrected for BSA 9.4 ml/m^2      MV E max desirae 89.8 cm/sec      MV A max desirae  100.7 cm/sec      MV E/A 0.89     Ao pk desirae 191.4 cm/sec      Ao max PG 14.7 mmHg      Ao V2 mean 93.6 cm/sec      Ao mean PG 4.3 mmHg      Ao V2 VTI 33.9 cm      SV(Ao) 236.4 ml      SI(Ao) 148.4 ml/m^2      PA acc slope 822.4 cm/sec^2      PA acc time 0.11 sec      PA pr(Accel) 31.5 mmHg       CV ECHO KAREEM - BZI_BMI 18.9 kilograms/m^2       CV ECHO KAREEM - BSA(HAYCOCK) 1.6 m^2       CV ECHO KAREEM - BZI_METRIC_WEIGHT 53.1 kg       CV ECHO KAREEM - BZI_METRIC_HEIGHT 167.6 cm      Target HR (85%) 127 bpm      Max. Pred. HR (100%) 149 bpm     Narrative:       · Left ventricular systolic function is hyperdynamic (EF > 70%). Estimated   EF appears to be in the range of 66 - 70%. Normal left ventricular cavity   size and wall thickness noted.  · The study is technically suboptimal for diagnosis.       ECG 12 Lead [[id number]] Collected:  04/06/18 1931     Updated:  04/07/18 1220    Narrative:       Test Reason : pre-op  Blood Pressure : **/** mmHG  Vent. Rate : 100 BPM     Atrial Rate : 100 BPM     P-R Int : 152 ms          QRS Dur : 068 ms      QT Int : 340 ms       P-R-T Axes : 065 046 059 degrees     QTc Int : 438 ms    Normal sinus rhythm  Septal infarct , age undetermined  Abnormal ECG  No previous ECGs available  Confirmed by Clint Marroquin ([id number]) on 4/7/2018 12:20:30 PM    Referred By:  TOMMY           Confirmed By:Clint Marroquin          Imaging:  Imaging Results (last 24 hours)     Procedure Component Value Units Date/Time    CT Lower Extremity Right Without Contrast [[id number]] Updated:  04/07/18 1421    CT Upper Extremity Left Without Contrast [[id number]] Collected:  04/07/18 0737     Updated:  04/07/18 0740    Narrative:       EXAMINATION: CT UPPER EXTREMITY LEFT WO CONTRAST-      CLINICAL INDICATION:fall        TECHNIQUE: Multiple axial CT images were obtained through the LEFT  SHOULDER without IV contrast administration.  The axial CT data set was  used to generate high resolution reformatted images in  the coronal and  sagittal planes to facilitate diagnostic accuracy and treatment  planning.      Radiation dose reduction techniques were utilized per ALARA protocol.  Automated exposure control was initiated through either or [employer] or  [employer] software packages by  protocol.       169.74 mGy.cm     COMPARISON: X-ray from same day      FINDINGS:  Comminuted fracture is again noted of the left humeral neck. Humeral  head remains well located. There is minimal anterior displacement of the  distal fracture fragments in relation to the proximal humeral head  fracture fragments.  No dislocation.  Degenerative changes noted including calcific tendinosis and AC joint  arthropathy.  Localized soft tissue edema and swelling is noted.  Partial imaging of the left lung demonstrates centrilobular nodularity  throughout most consistent with atypical pneumonia.       Impression:          1. Minimally displaced comminuted left humeral neck fracture as detailed  above.  2. Probable atypical pneumonia seen of the partially imaged left lung.     This report was finalized on 4/7/2018 7:38 AM by Dr. Viraj Appiah MD.       XR Chest 1 View [[id number]] Collected:  04/07/18 0735     Updated:  04/07/18 0738    Narrative:       EXAMINATION: XR CHEST 1 VW-      CLINICAL INDICATION:     pre-op     TECHNIQUE:  XR CHEST 1 VW-      COMPARISON: NONE      FINDINGS:   Chronic appearing interstitial lung changes.   Heart and mediastinal contours are unremarkable.   No pneumothorax.   No pleural effusion.   Left humerus neck fracture again noted.            Impression:       Chronic appearing interstitial lung changes. Otherwise no  acute cardiopulmonary findings.     This report was finalized on 4/7/2018 7:36 AM by Dr. Viraj Appiah MD.       XR Shoulder 2+ View Left [[id number]] Collected:  04/06/18 1523     Updated:  04/06/18 1623    Narrative:       EXAMINATION: XR SHOULDER 2+ VW LEFT-      CLINICAL INDICATION:fall         COMPARISON: None      TECHNIQUE: 2 views left shoulder     FINDINGS:   Left humerus neck fracture. Angulation without significant displacement.            Impression:       Angulated but nondisplaced left humeral neck fracture.     This report was finalized on 4/6/2018 3:24 PM by Dr. Viraj Appiah MD.       XR Knee 3 View Right [[id number]] Collected:  04/06/18 1524     Updated:  04/06/18 1623    Narrative:       EXAMINATION: XR KNEE 3 VW RIGHT-      CLINICAL INDICATION:fall        COMPARISON: None      TECHNIQUE: 3 views right knee     FINDINGS:   Fat fluid level suprapatellar bursa indicates lipohemarthrosis. Medial  tibial plateau fracture not displaced. No dislocation.           Impression:       Nondisplaced medial tibial plateau fracture with associated  lipohemarthrosis.     This report was finalized on 4/6/2018 3:24 PM by Dr. Viraj Appiah MD.             I personally viewed and interpreted the patient's EKG/Telemetry data.    Assessment:     Active Problems:    Closed displaced comminuted fracture of shaft of left humerus          Plan:       Preoperative risk assessment: No evidence of ACS. No angina, CHF or arrhythmia. EKG nml. Patient presents average risk for planned procedures. Please call with questions. Once patient has recovered from surgery consider stress testing as pt does have a family hx of CAD.      Clint Marroquin MD  04/07/18  2:36 PM

## 2018-04-07 NOTE — PLAN OF CARE
Problem: Patient Care Overview  Goal: Plan of Care Review  Outcome: Ongoing (interventions implemented as appropriate)   04/07/18 0102   Coping/Psychosocial   Plan of Care Reviewed With patient   Coping/Psychosocial   Patient Agreement with Plan of Care agrees   Plan of Care Review   Progress improving     Goal: Discharge Needs Assessment  Outcome: Ongoing (interventions implemented as appropriate)   04/07/18 0102   Discharge Needs Assessment   Readmission Within the Last 30 Days no previous admission in last 30 days   Concerns to be Addressed no discharge needs identified   Patient/Family Anticipates Transition to home with family;home   Patient/Family Anticipated Services at Transition    Transportation Concerns car, none   Transportation Anticipated family or friend will provide   Offered/Gave Vendor List no     Goal: Interprofessional Rounds/Family Conf  Outcome: Ongoing (interventions implemented as appropriate)

## 2018-04-07 NOTE — H&P
Hospitalist History and Physical        Patient Identification  Name: Debra Resendiz  Age/Sex: 72 y.o. female  :  1945        MRN: [id number]  Visit Number: [id number]  PCP: No Known Provider        Chief complaint left shoulder, right knee pain after falling    History of Present Illness:  Patient is a 72 y.o. female who presents with pain in her left shoulder and right knee after falling at home. She states she was walking down a steep embankment outside her house when her feet slipped out from under her and she toppled down several feet and landed on gravel. She had immediate pain in her left shoulder and right knee. She was unable to get up but fortunately a relative who was nearby came to check on her and found her on the ground after a few minutes. She was brought to our ED and found to have a left humeral neck fracture and a right medial tibial plateau fracture. Her basic labs showed leukocytosis which is felt to be reactive from the fall and fractures, as she has no signs/symptoms of infection at this time. Initial troponin was indeterminate and repeat continues to rise, without a corresponding elevated CPK to explain possible spillover. Patient denies any chest pain or shortness of breath. EKG does not show any evidence of acute ischemia. She denies history of coronary artery disease. She does state that coronary artery disease runs in her family, however. She has been admitted to the telemetry floor for further management of above-mentioned fractures as well as workup for indeterminate troponin elevation.     Review of Systems  Review of Systems   Constitutional: Negative for activity change, appetite change, chills, diaphoresis, fatigue, fever and unexpected weight change.   HENT: Negative for congestion, postnasal drip, rhinorrhea, sinus pressure and sore throat.    Eyes: Negative for photophobia, pain, discharge, redness, itching and visual disturbance.   Respiratory: Negative for cough,  shortness of breath and wheezing.    Cardiovascular: Negative for chest pain, palpitations and leg swelling.   Gastrointestinal: Negative for abdominal distention, abdominal pain, constipation, diarrhea, nausea and vomiting.   Endocrine: Negative for cold intolerance, heat intolerance, polydipsia, polyphagia and polyuria.   Genitourinary: Negative for difficulty urinating, dysuria, flank pain, frequency, hematuria and pelvic pain.   Musculoskeletal: Positive for arthralgias (left shoulder and right knee). Negative for back pain, joint swelling, myalgias, neck pain and neck stiffness.   Skin: Positive for wound (abasion left shoulder from fall). Negative for color change, pallor and rash.   Allergic/Immunologic: Negative for environmental allergies, food allergies and immunocompromised state.   Neurological: Positive for dizziness (says the morphine made her dizzy). Negative for tremors, syncope, weakness, light-headedness, numbness and headaches.   Hematological: Negative for adenopathy. Does not bruise/bleed easily.   Psychiatric/Behavioral: Negative for agitation, behavioral problems and confusion.       History  History reviewed. No pertinent past medical history.  Past Surgical History:   Procedure Laterality Date   • HIP SURGERY         Family History:  - Patient reports strong family history of coronary artery disease    Social History   Substance Use Topics   • Smoking status: Former Smoker   • Smokeless tobacco: Not on file   • Alcohol use No     Prescriptions Prior to Admission   Medication Sig Dispense Refill Last Dose   • diphenhydrAMINE (BENADRYL) 25 mg capsule Take 25 mg by mouth 2 (Two) Times a Day As Needed for Itching, Allergies or Sleep.   Past Month at Unknown time   • multivitamin (DAILY RED) tablet tablet Take 1 tablet by mouth Daily.   4/6/2018 at am     Allergies:  Penicillins    Objective     Vital Signs  Temp:  [97.1 °F (36.2 °C)-98.4 °F (36.9 °C)] 98.3 °F (36.8 °C)  Heart Rate:  []  96  Resp:  [16-20] 20  BP: (137-153)/(64-70) 153/70  Body mass index is 18.96 kg/m².    Physical Exam:  Physical Exam   Constitutional: She is oriented to person, place, and time. She appears well-developed and well-nourished.   No acute distress but appears uncomfortable   HENT:   Head: Normocephalic and atraumatic.   Mouth/Throat: Oropharynx is clear and moist.   Eyes: Conjunctivae and EOM are normal. Pupils are equal, round, and reactive to light.   Neck: Normal range of motion. Neck supple. No JVD present.   Cardiovascular:   No murmur heard.  Tachycardic, regular rhythm   Pulmonary/Chest: Effort normal and breath sounds normal. No respiratory distress. She has no wheezes. She exhibits no tenderness.   Abdominal: Soft. Bowel sounds are normal. She exhibits no distension. There is no tenderness.   Musculoskeletal: She exhibits deformity (some swelling noted around left shoulder). She exhibits no edema.   Left upper extremity in sling and right knee in immobilizer per ortho recommendations   Lymphadenopathy:     She has no cervical adenopathy.   Neurological: She is alert and oriented to person, place, and time.   No gross focal deficits   Skin: Skin is warm and dry. Capillary refill takes less than 2 seconds. No erythema.   Abrasion noted on left shoulder from fall   Psychiatric: She has a normal mood and affect. Her behavior is normal. Judgment and thought content normal.         Results Review:       Lab Results:    Results from last 7 days  Lab Units 04/06/18  1920   WBC 10*3/mm3 16.10*   HEMOGLOBIN g/dL 10.2*   PLATELETS 10*3/mm3 353           Results from last 7 days  Lab Units 04/06/18  1920   SODIUM mmol/L 137   POTASSIUM mmol/L 4.2   CHLORIDE mmol/L 107   CO2 mmol/L 25.8   BUN mg/dL 17   CREATININE mg/dL 0.64   CALCIUM mg/dL 9.3   GLUCOSE mg/dL 109         No results found for: HGBA1C    Results from last 7 days  Lab Units 04/06/18  1920   BILIRUBIN mg/dL 0.4   ALK PHOS U/L 86   AST (SGOT) U/L 27   ALT  (SGPT) U/L 19       Results from last 7 days  Lab Units 04/06/18 2120 04/06/18  1920   CK TOTAL U/L 97 99   TROPONIN I ng/mL 0.101* 0.065*   CK MB INDEX % 3.2*  --    MYOGLOBIN ng/mL 102.0  --            Results from last 7 days  Lab Units 04/06/18  1920   INR  1.04           I have reviewed the patient's laboratory results.    Imaging:  Imaging Results (last 72 hours)     Procedure Component Value Units Date/Time    XR Chest 1 View [[id number]] Updated:  04/06/18 2000    CT Upper Extremity Left Without Contrast [[id number]] Resulted:  04/06/18 1908     Updated:  04/06/18 1911    XR Shoulder 2+ View Left [[id number]] Collected:  04/06/18 1523     Updated:  04/06/18 1623    Narrative:       EXAMINATION: XR SHOULDER 2+ VW LEFT-      CLINICAL INDICATION:fall        COMPARISON: None      TECHNIQUE: 2 views left shoulder     FINDINGS:   Left humerus neck fracture. Angulation without significant displacement.            Impression:       Angulated but nondisplaced left humeral neck fracture.     This report was finalized on 4/6/2018 3:24 PM by Dr. Viraj Appiah MD.       XR Knee 3 View Right [[id number]] Collected:  04/06/18 1524     Updated:  04/06/18 1623    Narrative:       EXAMINATION: XR KNEE 3 VW RIGHT-      CLINICAL INDICATION:fall        COMPARISON: None      TECHNIQUE: 3 views right knee     FINDINGS:   Fat fluid level suprapatellar bursa indicates lipohemarthrosis. Medial  tibial plateau fracture not displaced. No dislocation.           Impression:       Nondisplaced medial tibial plateau fracture with associated  lipohemarthrosis.     This report was finalized on 4/6/2018 3:24 PM by Dr. Viraj Appiah MD.           Chest XR: no obvious infiltrate, pleural effusion or pulmonary edema appreciated.    I have personally reviewed the patient's radiologic imaging.        EKG: Sinus tachycardia, . QTc 438. Electronic interpretation mentions possible old septal infarct but I do not appreciate such an abnormality.   No overt ST changes to suggest acute ischemia.     I have personally reviewed the patient's EKG.        Assessment/Plan     Active Problems:  - Traumatic left humeral neck fracture and right tibial plateau fracture: admitted to the telemetry floor. Left arm in sling and right knee in immobilizer per ortho recommendations. Orthopedic surgery will see in consultation tomorrow. Will make NPO after midnight. Due to rising troponins (see below), will need cardiac evaluation prior to surgery.  - Indeterminate troponin elevation: initially suspected this was probably due to CPK spillover from traumatic injuries above. However, CPK is normal and yet troponin continues to trend upward. No documented history of CAD but strong family history reported. No active anginal symptoms or acute findings on EKG. Will go ahead and give full dose ASA empirically at this time while continuing to trend cardiac enzymes. Risk stratify by checking hemoglobin A1c and fasting lipid panel in the morning. ECHO ordered for tomorrow. Will consult cardiology for perioperative evaluation prior to proceeding with surgery.   - Leukocytosis: felt to be reactive from above traumatic fractures. No signs/symptoms of infection at this time. Continue to monitor closely, repeat labs in the morning.      DVT Prophylaxis: SCD to left leg    Estimated Length of Stay >2 midnights    I discussed the patient's findings, assessment and plan with the patient and her RN April on [facility].    * Patient is high risk due to rising troponin elevation following traumatic left humeral neck and right tibial plateau fractures earlier today    Cornel Andrade MD  04/06/18  10:43 PM

## 2018-04-07 NOTE — CONSULTS
Consults    Patient Identification:  Name:  Debra Resendiz  Age:  72 y.o.  Sex:  female  :  1945  MRN:  [id number]  Visit Number:  [id number]  Primary care provider:  No Known Provider    History of presenting illness:Patient is a 72 y.o. female consulted for pain in her left shoulder and right kneeafter falling at home. She states she was walking down a steep embankment outside her house when her feet slipped out from under her and she toppled down several feet and landed on gravel. She had immediate pain in her left shoulder and right knee.  Pt is NPO today.  She just had ECHO completed.  Denies numbness to the digits of the left hand.  ---------------------------------------------------------------------------------------------------------------------  Review of Systems   Constitutional: Negative for activity change, appetite change, chills, diaphoresis, fatigue, fever and unexpected weight change.   HENT: Negative for congestion, postnasal drip, rhinorrhea, sinus pressure and sore throat.    Eyes: Negative for photophobia, pain, discharge, redness, itching and visual disturbance.   Respiratory: Negative for cough, shortness of breath and wheezing.    Cardiovascular: Negative for chest pain, palpitations and leg swelling.   Gastrointestinal: Negative for abdominal distention, abdominal pain, constipation, diarrhea, nausea and vomiting.   Endocrine: Negative for cold intolerance, heat intolerance, polydipsia, polyphagia and polyuria.   Genitourinary: Negative for difficulty urinating, dysuria, flank pain, frequency, hematuria and pelvic pain.   Musculoskeletal: Positive for arthralgias (left shoulder and right knee). Negative for back pain, joint swelling, myalgias, neck pain and neck stiffness.   Skin: Negative for rash.   Allergic/Immunologic: Negative for environmental allergies, food allergies and immunocompromised state.   Neurological: Negative for tremors, syncope, weakness, light-headedness,  numbness and headaches.   Hematological: Negative for adenopathy. Does not bruise/bleed easily.   Psychiatric/Behavioral: Negative for agitation, behavioral problems and confusion.   ---------------------------------------------------------------------------------------------------------------------   Past History:  History reviewed. No pertinent family history.  History reviewed. No pertinent past medical history.  Past Surgical History:   Procedure Laterality Date   • HIP SURGERY       Social History     Social History   • Marital status:      Social History Main Topics   • Smoking status: Former Smoker   • Alcohol use No   • Drug use: No     Other Topics Concern   • Not on file     ---------------------------------------------------------------------------------------------------------------------   Allergies:  Penicillins  ---------------------------------------------------------------------------------------------------------------------   Prior to Admission Medications     Prescriptions Last Dose Informant Patient Reported? Taking?    diphenhydrAMINE (BENADRYL) 25 mg capsule Past Month Self Yes Yes    Take 25 mg by mouth 2 (Two) Times a Day As Needed for Itching, Allergies or Sleep.    multivitamin (DAILY RED) tablet tablet 4/6/2018 Self Yes Yes    Take 1 tablet by mouth Daily.        [facility] Meds:    multivitamin 1 tablet Oral Daily       sodium chloride 75 mL/hr Last Rate: 75 mL/hr (04/07/18 0431)     ---------------------------------------------------------------------------------------------------------------------   Vital Signs:  Temp:  [97.1 °F (36.2 °C)-98.4 °F (36.9 °C)] 97.9 °F (36.6 °C)  Heart Rate:  [] 90  Resp:  [16-20] 18  BP: (137-153)/(56-74) 145/56  1    04/06/18  1320 04/06/18  1084   Weight: 49.9 kg (110 lb) 53.3 kg (117 lb 8 oz)     Body mass index is 18.96  kg/m².  ---------------------------------------------------------------------------------------------------------------------   Physical exam:  Constitutional:  Well-developed and well-nourished.  No respiratory distress.      HENT:  Head: Normocephalic and atraumatic.  Mouth:  Moist mucous membranes.    Eyes:  Conjunctivae and EOM are normal.  Pupils are equal, round, and reactive to light.  No scleral icterus.  Neck:  Neck supple.  No JVD present.    Cardiovascular:  Normal rate, regular rhythm and normal heart sounds with no murmur.  Pulmonary/Chest:  No respiratory distress, no wheezes, no crackles, with normal breath sounds and good air movement.  Abdominal:  Soft.  Bowel sounds are normal.  No distension and no tenderness.   Musculoskeletal:  Left shoulder and arm in sling active rom of the digits of the left hand.  Pt right knee in immobilizer decreased rom secondary to pain.   Neurological:  Alert and oriented to person, place, and time.  No cranial nerve deficit.  No tongue deviation.  No facial droop.  No slurred speech.   Skin:  Skin is warm and dry.  No rash noted.  No pallor.   Psychiatric:  Normal mood and affect.  Behavior is normal.  Judgment and thought content normal.   Peripheral vascular:  No edema and strong pulses on all 4 extremities.  ---------------------------------------------------------------------------------------------------------------------   .  ---------------------------------------------------------------------------------------------------------------------     Results from last 7 days  Lab Units 04/07/18  0536 04/07/18 0207 04/06/18  1920   WBC 10*3/mm3  --  11.72 16.10*   HEMOGLOBIN g/dL  --  9.6* 10.2*   HEMATOCRIT %  --  30.1* 31.1*   MCV fL  --  97.4* 95.4*   MCHC g/dL  --  31.9* 32.8*   PLATELETS 10*3/mm3  --  330 353   INR  1.14*  --  1.04           Results from last 7 days  Lab Units 04/07/18 0208 04/06/18  1920   SODIUM mmol/L 136 137   POTASSIUM mmol/L 4.0 4.2    CHLORIDE mmol/L 108 107   CO2 mmol/L 19.9* 25.8   BUN mg/dL 18 17   CREATININE mg/dL 0.64 0.64   EGFR IF NONAFRICN AM mL/min/1.73 91 91   CALCIUM mg/dL 9.0 9.3   GLUCOSE mg/dL 113* 109   ALBUMIN g/dL 3.30* 3.60   BILIRUBIN mg/dL 0.4 0.4   ALK PHOS U/L 80 86   AST (SGOT) U/L 26 27   ALT (SGPT) U/L 19 19   Estimated Creatinine Clearance: 54.3 mL/min (by C-G formula based on SCr of 0.64 mg/dL).  No results found for: AMMONIA    Results from last 7 days  Lab Units 04/07/18  0926 04/07/18  0208 04/06/18  2120   CK TOTAL U/L 112 112 97   TROPONIN I ng/mL 0.131* 0.158* 0.101*   CK MB INDEX % 2.2 2.7 3.2*   MYOGLOBIN ng/mL 58.0 96.0 102.0         Lab Results   Component Value Date    HGBA1C 5.20 04/06/2018     No results found for: TSH, FREET4  No results found for: PREGTESTUR, PREGSERUM, HCG, HCGQUANT  Pain Management Panel     There is no flowsheet data to display.                      Results from last 7 days  Lab Units 04/07/18  0536   CHOLESTEROL mg/dL 123   TRIGLYCERIDES mg/dL 41   HDL CHOL mg/dL 47*   LDL CHOL mg/dL 68     ---------------------------------------------------------------------------------------------------------------------   Imaging Results (last 7 days)     Procedure Component Value Units Date/Time    CT Upper Extremity Left Without Contrast [[id number]] Collected:  04/07/18 0737     Updated:  04/07/18 0740    Narrative:       EXAMINATION: CT UPPER EXTREMITY LEFT WO CONTRAST-      CLINICAL INDICATION:fall        TECHNIQUE: Multiple axial CT images were obtained through the LEFT  SHOULDER without IV contrast administration.  The axial CT data set was  used to generate high resolution reformatted images in the coronal and  sagittal planes to facilitate diagnostic accuracy and treatment  planning.      Radiation dose reduction techniques were utilized per ALARA protocol.  Automated exposure control was initiated through either or CareDose or  DoseRight software packages by  protocol.        169.74 mGy.cm     COMPARISON: X-ray from same day      FINDINGS:  Comminuted fracture is again noted of the left humeral neck. Humeral  head remains well located. There is minimal anterior displacement of the  distal fracture fragments in relation to the proximal humeral head  fracture fragments.  No dislocation.  Degenerative changes noted including calcific tendinosis and AC joint  arthropathy.  Localized soft tissue edema and swelling is noted.  Partial imaging of the left lung demonstrates centrilobular nodularity  throughout most consistent with atypical pneumonia.       Impression:          1. Minimally displaced comminuted left humeral neck fracture as detailed  above.  2. Probable atypical pneumonia seen of the partially imaged left lung.     This report was finalized on 4/7/2018 7:38 AM by Dr. Viraj Appiah MD.       XR Chest 1 View [[id number]] Collected:  04/07/18 0735     Updated:  04/07/18 0738    Narrative:       EXAMINATION: XR CHEST 1 VW-      CLINICAL INDICATION:     pre-op     TECHNIQUE:  XR CHEST 1 VW-      COMPARISON: NONE      FINDINGS:   Chronic appearing interstitial lung changes.   Heart and mediastinal contours are unremarkable.   No pneumothorax.   No pleural effusion.   Left humerus neck fracture again noted.            Impression:       Chronic appearing interstitial lung changes. Otherwise no  acute cardiopulmonary findings.     This report was finalized on 4/7/2018 7:36 AM by Dr. Viraj Appiah MD.       XR Shoulder 2+ View Left [[id number]] Collected:  04/06/18 1523     Updated:  04/06/18 1623    Narrative:       EXAMINATION: XR SHOULDER 2+ VW LEFT-      CLINICAL INDICATION:fall        COMPARISON: None      TECHNIQUE: 2 views left shoulder     FINDINGS:   Left humerus neck fracture. Angulation without significant displacement.            Impression:       Angulated but nondisplaced left humeral neck fracture.     This report was finalized on 4/6/2018 3:24 PM by Dr. Viraj Appiah  MD.       XR Knee 3 View Right [[id number]] Collected:  04/06/18 1524     Updated:  04/06/18 1623    Narrative:       EXAMINATION: XR KNEE 3 VW RIGHT-      CLINICAL INDICATION:fall        COMPARISON: None      TECHNIQUE: 3 views right knee     FINDINGS:   Fat fluid level suprapatellar bursa indicates lipohemarthrosis. Medial  tibial plateau fracture not displaced. No dislocation.           Impression:       Nondisplaced medial tibial plateau fracture with associated  lipohemarthrosis.     This report was finalized on 4/6/2018 3:24 PM by Dr. Viraj Appiah MD.           ----------------------------------------------------------------------------------------------------------------------  Assessment and Plan: Left prox humerus fx and right tibial plateau fx - Pt is to have CT of the right knee.  Pt will have a surgical intervention to the left proximal humerus tomorrow by DR Aburto a left prox. humerus ORIF.  Pt is to maintain in sling and Maintain well padded right knee immobilizer and no wt bear to the right leg.  Pt may be released from NPO in ortho stand point for today.  Pt is to be placed back on npo at midnight tonight.    Thank you for the consult.    Agustin Manzanares, SAMANTHA  04/07/18  12:05 PM

## 2018-04-07 NOTE — PLAN OF CARE
Problem: Pain, Acute (Adult)  Goal: Identify Related Risk Factors and Signs and Symptoms  Outcome: Ongoing (interventions implemented as appropriate)    Goal: Acceptable Pain Control/Comfort Level  Outcome: Ongoing (interventions implemented as appropriate)   04/07/18 0101   Pain, Acute (Adult)   Acceptable Pain Control/Comfort Level making progress toward outcome       Problem: Fracture Orthopaedic (Adult)  Goal: Signs and Symptoms of Listed Potential Problems Will be Absent, Minimized or Managed (Fracture Orthopaedic)  Outcome: Ongoing (interventions implemented as appropriate)   04/07/18 0101   Goal/Outcome Evaluation   Problems Assessed (Orthopaedic Fracture) all   Problems Present (Orthopaedic Fracture) functional deficit/self-care deficit;pain

## 2018-04-08 ENCOUNTER — ANESTHESIA EVENT (OUTPATIENT)
Dept: PERIOP | Facility: HOSPITAL | Age: 73
End: 2018-04-08

## 2018-04-08 ENCOUNTER — ANESTHESIA (OUTPATIENT)
Dept: PERIOP | Facility: HOSPITAL | Age: 73
End: 2018-04-08

## 2018-04-08 ENCOUNTER — APPOINTMENT (OUTPATIENT)
Dept: ULTRASOUND IMAGING | Facility: HOSPITAL | Age: 73
End: 2018-04-08

## 2018-04-08 ENCOUNTER — APPOINTMENT (OUTPATIENT)
Dept: GENERAL RADIOLOGY | Facility: HOSPITAL | Age: 73
End: 2018-04-08

## 2018-04-08 LAB
ANION GAP SERPL CALCULATED.3IONS-SCNC: 7.3 MMOL/L (ref 3.6–11.2)
BASOPHILS # BLD AUTO: 0.03 10*3/MM3 (ref 0–0.3)
BASOPHILS NFR BLD AUTO: 0.3 % (ref 0–2)
BUN BLD-MCNC: 16 MG/DL (ref 7–21)
BUN/CREAT SERPL: 29.1 (ref 7–25)
CALCIUM SPEC-SCNC: 9.3 MG/DL (ref 7.7–10)
CHLORIDE SERPL-SCNC: 103 MMOL/L (ref 99–112)
CO2 SERPL-SCNC: 24.7 MMOL/L (ref 24.3–31.9)
CREAT BLD-MCNC: 0.55 MG/DL (ref 0.43–1.29)
DEPRECATED RDW RBC AUTO: 47.7 FL (ref 37–54)
EOSINOPHIL # BLD AUTO: 0.03 10*3/MM3 (ref 0–0.7)
EOSINOPHIL NFR BLD AUTO: 0.3 % (ref 0–7)
ERYTHROCYTE [DISTWIDTH] IN BLOOD BY AUTOMATED COUNT: 13.6 % (ref 11.5–14.5)
FERRITIN SERPL-MCNC: 63 NG/ML (ref 10–290.3)
FOLATE SERPL-MCNC: 9.15 NG/ML (ref 5.4–20)
GFR SERPL CREATININE-BSD FRML MDRD: 109 ML/MIN/1.73
GLUCOSE BLD-MCNC: 87 MG/DL (ref 70–110)
GLUCOSE BLDC GLUCOMTR-MCNC: 104 MG/DL (ref 70–130)
HCT VFR BLD AUTO: 30.9 % (ref 37–47)
HCT VFR BLD AUTO: 31.1 % (ref 37–47)
HGB BLD-MCNC: 10 G/DL (ref 12–16)
HGB BLD-MCNC: 10.1 G/DL (ref 12–16)
IMM GRANULOCYTES # BLD: 0.04 10*3/MM3 (ref 0–0.03)
IMM GRANULOCYTES NFR BLD: 0.4 % (ref 0–0.5)
IRON 24H UR-MRATE: 32 MCG/DL (ref 49–151)
IRON SATN MFR SERPL: 13 % (ref 15–50)
LYMPHOCYTES # BLD AUTO: 2.16 10*3/MM3 (ref 1–3)
LYMPHOCYTES NFR BLD AUTO: 21.5 % (ref 16–46)
MCH RBC QN AUTO: 31 PG (ref 27–33)
MCHC RBC AUTO-ENTMCNC: 32.5 G/DL (ref 33–37)
MCV RBC AUTO: 95.4 FL (ref 80–94)
MONOCYTES # BLD AUTO: 1.03 10*3/MM3 (ref 0.1–0.9)
MONOCYTES NFR BLD AUTO: 10.2 % (ref 0–12)
NEUTROPHILS # BLD AUTO: 6.77 10*3/MM3 (ref 1.4–6.5)
NEUTROPHILS NFR BLD AUTO: 67.3 % (ref 40–75)
OSMOLALITY SERPL CALC.SUM OF ELEC: 270.6 MOSM/KG (ref 273–305)
PLATELET # BLD AUTO: 306 10*3/MM3 (ref 130–400)
PMV BLD AUTO: 9.9 FL (ref 6–10)
POTASSIUM BLD-SCNC: 3.9 MMOL/L (ref 3.5–5.3)
RBC # BLD AUTO: 3.26 10*6/MM3 (ref 4.2–5.4)
RETICS #: 0.04 10*6/MM3 (ref 0.02–0.13)
RETICS/RBC NFR AUTO: 1.3 % (ref 0.5–2)
SODIUM BLD-SCNC: 135 MMOL/L (ref 135–153)
TIBC SERPL-MCNC: 244 MCG/DL (ref 241–421)
TROPONIN I SERPL-MCNC: 0.07 NG/ML
TSH SERPL DL<=0.05 MIU/L-ACNC: 2.24 MIU/ML (ref 0.55–4.78)
VIT B12 BLD-MCNC: 390 PG/ML (ref 211–911)
WBC NRBC COR # BLD: 10.06 10*3/MM3 (ref 4.5–12.5)

## 2018-04-08 PROCEDURE — L1830 KO IMMOB CANVAS LONG PRE OTS: HCPCS | Performed by: ORTHOPAEDIC SURGERY

## 2018-04-08 PROCEDURE — 82962 GLUCOSE BLOOD TEST: CPT

## 2018-04-08 PROCEDURE — 25010000002 DEXAMETHASONE PER 1 MG: Performed by: NURSE ANESTHETIST, CERTIFIED REGISTERED

## 2018-04-08 PROCEDURE — C1713 ANCHOR/SCREW BN/BN,TIS/BN: HCPCS | Performed by: ORTHOPAEDIC SURGERY

## 2018-04-08 PROCEDURE — 25010000002 PROPOFOL 10 MG/ML EMULSION: Performed by: NURSE ANESTHETIST, CERTIFIED REGISTERED

## 2018-04-08 PROCEDURE — 25010000002 ONDANSETRON PER 1 MG: Performed by: HOSPITALIST

## 2018-04-08 PROCEDURE — 85018 HEMOGLOBIN: CPT | Performed by: INTERNAL MEDICINE

## 2018-04-08 PROCEDURE — 94799 UNLISTED PULMONARY SVC/PX: CPT

## 2018-04-08 PROCEDURE — 25010000002 HYDROMORPHONE PER 4 MG: Performed by: HOSPITALIST

## 2018-04-08 PROCEDURE — 85014 HEMATOCRIT: CPT | Performed by: INTERNAL MEDICINE

## 2018-04-08 PROCEDURE — 25010000002 ONDANSETRON PER 1 MG: Performed by: NURSE ANESTHETIST, CERTIFIED REGISTERED

## 2018-04-08 PROCEDURE — 93970 EXTREMITY STUDY: CPT | Performed by: RADIOLOGY

## 2018-04-08 PROCEDURE — 84484 ASSAY OF TROPONIN QUANT: CPT | Performed by: INTERNAL MEDICINE

## 2018-04-08 PROCEDURE — 76000 FLUOROSCOPY <1 HR PHYS/QHP: CPT

## 2018-04-08 PROCEDURE — 85045 AUTOMATED RETICULOCYTE COUNT: CPT | Performed by: INTERNAL MEDICINE

## 2018-04-08 PROCEDURE — 85025 COMPLETE CBC W/AUTO DIFF WBC: CPT | Performed by: INTERNAL MEDICINE

## 2018-04-08 PROCEDURE — 99233 SBSQ HOSP IP/OBS HIGH 50: CPT | Performed by: INTERNAL MEDICINE

## 2018-04-08 PROCEDURE — 82746 ASSAY OF FOLIC ACID SERUM: CPT | Performed by: INTERNAL MEDICINE

## 2018-04-08 PROCEDURE — 84443 ASSAY THYROID STIM HORMONE: CPT | Performed by: INTERNAL MEDICINE

## 2018-04-08 PROCEDURE — 82728 ASSAY OF FERRITIN: CPT | Performed by: INTERNAL MEDICINE

## 2018-04-08 PROCEDURE — 83540 ASSAY OF IRON: CPT | Performed by: INTERNAL MEDICINE

## 2018-04-08 PROCEDURE — 83550 IRON BINDING TEST: CPT | Performed by: INTERNAL MEDICINE

## 2018-04-08 PROCEDURE — 80048 BASIC METABOLIC PNL TOTAL CA: CPT | Performed by: INTERNAL MEDICINE

## 2018-04-08 PROCEDURE — 25010000002 PHENYLEPHRINE PER 1 ML: Performed by: NURSE ANESTHETIST, CERTIFIED REGISTERED

## 2018-04-08 PROCEDURE — 73060 X-RAY EXAM OF HUMERUS: CPT | Performed by: RADIOLOGY

## 2018-04-08 PROCEDURE — 25010000002 FENTANYL CITRATE (PF) 100 MCG/2ML SOLUTION: Performed by: NURSE ANESTHETIST, CERTIFIED REGISTERED

## 2018-04-08 PROCEDURE — 93970 EXTREMITY STUDY: CPT

## 2018-04-08 PROCEDURE — 82607 VITAMIN B-12: CPT | Performed by: INTERNAL MEDICINE

## 2018-04-08 PROCEDURE — 0PSD04Z REPOSITION LEFT HUMERAL HEAD WITH INTERNAL FIXATION DEVICE, OPEN APPROACH: ICD-10-PCS | Performed by: ORTHOPAEDIC SURGERY

## 2018-04-08 PROCEDURE — 25010000002 MIDAZOLAM PER 1 MG: Performed by: NURSE ANESTHETIST, CERTIFIED REGISTERED

## 2018-04-08 DEVICE — SCRW CORT S/TAP 3.5X26MM: Type: IMPLANTABLE DEVICE | Status: FUNCTIONAL

## 2018-04-08 DEVICE — SCRW LK S/TAP STRDRV 3.5X46MM: Type: IMPLANTABLE DEVICE | Status: FUNCTIONAL

## 2018-04-08 DEVICE — SCRW LK S/TAP STRDRV 3.5X42MM: Type: IMPLANTABLE DEVICE | Status: FUNCTIONAL

## 2018-04-08 DEVICE — SCRW LK S/TAP STRDRV 3.5X26MM: Type: IMPLANTABLE DEVICE | Status: FUNCTIONAL

## 2018-04-08 DEVICE — IMPLANTABLE DEVICE: Type: IMPLANTABLE DEVICE | Status: FUNCTIONAL

## 2018-04-08 DEVICE — SCRW LK S/TAP STRDRV 3.5X34MM: Type: IMPLANTABLE DEVICE | Status: FUNCTIONAL

## 2018-04-08 DEVICE — SCRW LK S/TAP STRDRV 3.5X36MM: Type: IMPLANTABLE DEVICE | Status: FUNCTIONAL

## 2018-04-08 DEVICE — GII QUICKANCHOR PLUS SIZE 2 (5 METRIC) PANACRYL BRAIDED ABSORBABLE SUTURE 36 INCHES (91CM), DOUBLE ARMED WITH CP-2 NEEDLES, WITH DISPOSABLE INSERTER.
Type: IMPLANTABLE DEVICE | Status: FUNCTIONAL
Brand: GII QUICKANCHOR PANACRYL

## 2018-04-08 RX ORDER — FENTANYL CITRATE 50 UG/ML
INJECTION, SOLUTION INTRAMUSCULAR; INTRAVENOUS AS NEEDED
Status: DISCONTINUED | OUTPATIENT
Start: 2018-04-08 | End: 2018-04-08 | Stop reason: SURG

## 2018-04-08 RX ORDER — HYDROCODONE BITARTRATE AND ACETAMINOPHEN 5; 325 MG/1; MG/1
1 TABLET ORAL EVERY 4 HOURS PRN
Status: DISCONTINUED | OUTPATIENT
Start: 2018-04-08 | End: 2018-04-13 | Stop reason: HOSPADM

## 2018-04-08 RX ORDER — BUPIVACAINE HYDROCHLORIDE AND EPINEPHRINE 5; 5 MG/ML; UG/ML
INJECTION, SOLUTION EPIDURAL; INTRACAUDAL; PERINEURAL AS NEEDED
Status: DISCONTINUED | OUTPATIENT
Start: 2018-04-08 | End: 2018-04-08 | Stop reason: HOSPADM

## 2018-04-08 RX ORDER — MIDAZOLAM HYDROCHLORIDE 1 MG/ML
INJECTION INTRAMUSCULAR; INTRAVENOUS AS NEEDED
Status: DISCONTINUED | OUTPATIENT
Start: 2018-04-08 | End: 2018-04-08 | Stop reason: SURG

## 2018-04-08 RX ORDER — MEPERIDINE HYDROCHLORIDE 50 MG/ML
12.5 INJECTION INTRAMUSCULAR; INTRAVENOUS; SUBCUTANEOUS
Status: DISCONTINUED | OUTPATIENT
Start: 2018-04-08 | End: 2018-04-08 | Stop reason: HOSPADM

## 2018-04-08 RX ORDER — IPRATROPIUM BROMIDE AND ALBUTEROL SULFATE 2.5; .5 MG/3ML; MG/3ML
3 SOLUTION RESPIRATORY (INHALATION) ONCE AS NEEDED
Status: DISCONTINUED | OUTPATIENT
Start: 2018-04-08 | End: 2018-04-08 | Stop reason: HOSPADM

## 2018-04-08 RX ORDER — FENTANYL CITRATE 50 UG/ML
50 INJECTION, SOLUTION INTRAMUSCULAR; INTRAVENOUS
Status: DISCONTINUED | OUTPATIENT
Start: 2018-04-08 | End: 2018-04-08 | Stop reason: HOSPADM

## 2018-04-08 RX ORDER — SODIUM CHLORIDE 0.9 % (FLUSH) 0.9 %
1-10 SYRINGE (ML) INJECTION AS NEEDED
Status: DISCONTINUED | OUTPATIENT
Start: 2018-04-08 | End: 2018-04-08 | Stop reason: HOSPADM

## 2018-04-08 RX ORDER — FERROUS SULFATE 325(65) MG
325 TABLET ORAL
Status: DISCONTINUED | OUTPATIENT
Start: 2018-04-09 | End: 2018-04-13 | Stop reason: HOSPADM

## 2018-04-08 RX ORDER — OXYCODONE HYDROCHLORIDE AND ACETAMINOPHEN 5; 325 MG/1; MG/1
1 TABLET ORAL ONCE AS NEEDED
Status: DISCONTINUED | OUTPATIENT
Start: 2018-04-08 | End: 2018-04-08 | Stop reason: HOSPADM

## 2018-04-08 RX ORDER — CLINDAMYCIN PHOSPHATE 900 MG/50ML
900 INJECTION INTRAVENOUS
Status: COMPLETED | OUTPATIENT
Start: 2018-04-08 | End: 2018-04-08

## 2018-04-08 RX ORDER — FAMOTIDINE 10 MG/ML
INJECTION, SOLUTION INTRAVENOUS AS NEEDED
Status: DISCONTINUED | OUTPATIENT
Start: 2018-04-08 | End: 2018-04-08 | Stop reason: SURG

## 2018-04-08 RX ORDER — LIDOCAINE HYDROCHLORIDE 20 MG/ML
INJECTION, SOLUTION EPIDURAL; INFILTRATION; INTRACAUDAL; PERINEURAL AS NEEDED
Status: DISCONTINUED | OUTPATIENT
Start: 2018-04-08 | End: 2018-04-08 | Stop reason: SURG

## 2018-04-08 RX ORDER — CLINDAMYCIN PHOSPHATE 900 MG/50ML
900 INJECTION INTRAVENOUS EVERY 8 HOURS
Status: COMPLETED | OUTPATIENT
Start: 2018-04-08 | End: 2018-04-09

## 2018-04-08 RX ORDER — DEXAMETHASONE SODIUM PHOSPHATE 10 MG/ML
INJECTION INTRAMUSCULAR; INTRAVENOUS AS NEEDED
Status: DISCONTINUED | OUTPATIENT
Start: 2018-04-08 | End: 2018-04-08 | Stop reason: SURG

## 2018-04-08 RX ORDER — ONDANSETRON 2 MG/ML
4 INJECTION INTRAMUSCULAR; INTRAVENOUS ONCE AS NEEDED
Status: DISCONTINUED | OUTPATIENT
Start: 2018-04-08 | End: 2018-04-08 | Stop reason: HOSPADM

## 2018-04-08 RX ORDER — ONDANSETRON 2 MG/ML
INJECTION INTRAMUSCULAR; INTRAVENOUS AS NEEDED
Status: DISCONTINUED | OUTPATIENT
Start: 2018-04-08 | End: 2018-04-08 | Stop reason: SURG

## 2018-04-08 RX ORDER — PROPOFOL 10 MG/ML
VIAL (ML) INTRAVENOUS AS NEEDED
Status: DISCONTINUED | OUTPATIENT
Start: 2018-04-08 | End: 2018-04-08 | Stop reason: SURG

## 2018-04-08 RX ORDER — VECURONIUM BROMIDE 1 MG/ML
INJECTION, POWDER, LYOPHILIZED, FOR SOLUTION INTRAVENOUS AS NEEDED
Status: DISCONTINUED | OUTPATIENT
Start: 2018-04-08 | End: 2018-04-08 | Stop reason: SURG

## 2018-04-08 RX ORDER — SODIUM CHLORIDE, SODIUM LACTATE, POTASSIUM CHLORIDE, CALCIUM CHLORIDE 600; 310; 30; 20 MG/100ML; MG/100ML; MG/100ML; MG/100ML
125 INJECTION, SOLUTION INTRAVENOUS CONTINUOUS
Status: DISCONTINUED | OUTPATIENT
Start: 2018-04-08 | End: 2018-04-08

## 2018-04-08 RX ADMIN — PHENYLEPHRINE HYDROCHLORIDE 100 MCG: 10 INJECTION, SOLUTION INTRAMUSCULAR; INTRAVENOUS; SUBCUTANEOUS at 10:02

## 2018-04-08 RX ADMIN — CLINDAMYCIN PHOSPHATE 900 MG: 18 INJECTION, SOLUTION INTRAVENOUS at 09:25

## 2018-04-08 RX ADMIN — PHENYLEPHRINE HYDROCHLORIDE 100 MCG: 10 INJECTION, SOLUTION INTRAMUSCULAR; INTRAVENOUS; SUBCUTANEOUS at 09:22

## 2018-04-08 RX ADMIN — VECURONIUM BROMIDE 5 MG: 1 INJECTION, POWDER, LYOPHILIZED, FOR SOLUTION INTRAVENOUS at 09:12

## 2018-04-08 RX ADMIN — MIDAZOLAM HYDROCHLORIDE 2 MG: 1 INJECTION, SOLUTION INTRAMUSCULAR; INTRAVENOUS at 09:05

## 2018-04-08 RX ADMIN — HYDROCODONE BITARTRATE AND ACETAMINOPHEN 1 TABLET: 5; 325 TABLET ORAL at 19:09

## 2018-04-08 RX ADMIN — PHENYLEPHRINE HYDROCHLORIDE 100 MCG: 10 INJECTION, SOLUTION INTRAMUSCULAR; INTRAVENOUS; SUBCUTANEOUS at 10:22

## 2018-04-08 RX ADMIN — CLINDAMYCIN PHOSPHATE 900 MG: 18 INJECTION, SOLUTION INTRAVENOUS at 17:11

## 2018-04-08 RX ADMIN — FENTANYL CITRATE 100 MCG: 50 INJECTION, SOLUTION INTRAMUSCULAR; INTRAVENOUS at 10:52

## 2018-04-08 RX ADMIN — HYDROMORPHONE HYDROCHLORIDE 0.5 MG: 1 INJECTION, SOLUTION INTRAMUSCULAR; INTRAVENOUS; SUBCUTANEOUS at 03:04

## 2018-04-08 RX ADMIN — DOXYCYCLINE 100 MG: 100 CAPSULE ORAL at 19:09

## 2018-04-08 RX ADMIN — FENTANYL CITRATE 50 MCG: 50 INJECTION, SOLUTION INTRAMUSCULAR; INTRAVENOUS at 09:49

## 2018-04-08 RX ADMIN — DEXAMETHASONE SODIUM PHOSPHATE 4 MG: 10 INJECTION INTRAMUSCULAR; INTRAVENOUS at 09:05

## 2018-04-08 RX ADMIN — FENTANYL CITRATE 50 MCG: 50 INJECTION, SOLUTION INTRAMUSCULAR; INTRAVENOUS at 09:55

## 2018-04-08 RX ADMIN — FENTANYL CITRATE 50 MCG: 50 INJECTION, SOLUTION INTRAMUSCULAR; INTRAVENOUS at 10:40

## 2018-04-08 RX ADMIN — HYDROMORPHONE HYDROCHLORIDE 0.5 MG: 1 INJECTION, SOLUTION INTRAMUSCULAR; INTRAVENOUS; SUBCUTANEOUS at 14:31

## 2018-04-08 RX ADMIN — HYDROMORPHONE HYDROCHLORIDE 0.5 MG: 1 INJECTION, SOLUTION INTRAMUSCULAR; INTRAVENOUS; SUBCUTANEOUS at 06:37

## 2018-04-08 RX ADMIN — ONDANSETRON 4 MG: 2 INJECTION, SOLUTION INTRAMUSCULAR; INTRAVENOUS at 09:05

## 2018-04-08 RX ADMIN — ONDANSETRON 4 MG: 2 INJECTION INTRAMUSCULAR; INTRAVENOUS at 06:37

## 2018-04-08 RX ADMIN — PHENYLEPHRINE HYDROCHLORIDE 100 MCG: 10 INJECTION, SOLUTION INTRAMUSCULAR; INTRAVENOUS; SUBCUTANEOUS at 09:34

## 2018-04-08 RX ADMIN — PROPOFOL 100 MG: 10 INJECTION, EMULSION INTRAVENOUS at 09:12

## 2018-04-08 RX ADMIN — PHENYLEPHRINE HYDROCHLORIDE 200 MCG: 10 INJECTION, SOLUTION INTRAMUSCULAR; INTRAVENOUS; SUBCUTANEOUS at 10:10

## 2018-04-08 RX ADMIN — SODIUM CHLORIDE, POTASSIUM CHLORIDE, SODIUM LACTATE AND CALCIUM CHLORIDE: 600; 310; 30; 20 INJECTION, SOLUTION INTRAVENOUS at 09:05

## 2018-04-08 RX ADMIN — LIDOCAINE HYDROCHLORIDE 100 MG: 20 INJECTION, SOLUTION EPIDURAL; INFILTRATION; INTRACAUDAL; PERINEURAL at 09:12

## 2018-04-08 RX ADMIN — FAMOTIDINE 20 MG: 10 INJECTION, SOLUTION INTRAVENOUS at 09:05

## 2018-04-08 NOTE — OP NOTE
Preoperative diagnostic: Displaced comminuted fracture left proximal humerus.  Minimally displaced fracture right proximal tibia.    Postoperative diagnostic: Sign.    Procedure: Open reduction internal fixation left proximal humerus with plate and screws.  Conservative treatment with knee immobilizer for right proximal tibia.    Patient under general anesthesia in the beachchair position, prepping and draping of her left shoulder and upper extremity.  Deltopectoral incision.  Identification of the long portion of the biceps tendon.  The deltoid muscle is pulled laterally to expose the fracture.  Using a bone clamp I manipulated the fracture and reduce it under C-arm guidance.  I fixed it temporarily with a K wire and then I use an anatomic locking plate to fix the proximal humerus with several  locking screws in humeral head.  I also reattached the greater tuberosity with the FiberWire #2 to the plate and I had a locking screw in the greater tuberosity also.  The plate is fixed distally with 3 screws. I saved images  in AP and lateral for  her x-ray file.  Good reduction and fixation.  We cleaned the wound with normal saline.  I closed  the deltopectoral interval with Vicryl No. 1.  The subcutaneous with Monocryl 2-0  and the skin with staples.  Estimated blood loss 50 cc.  For the treatment of the proximal tibia with a place her in a long knee immobilizer well-padded.  Final

## 2018-04-08 NOTE — PROGRESS NOTES
"  I have seen the patient in conjunction with Lissette ENGLISH    History of presenting illness:  Patient states that her left arm is feeling a little better since surgery although she did not write now appear to be aware she has been to surgery.  She was glad to hear that fixed.  She has not been back to the floor long time I saw her.  She states she does feel hungry, she denies any chest pain feels like her breathing is doing \"okay\".  She states her right knee is not hurting as bad.  Orthopedics decided to treat the knee conservatively with nonweightbearing for 6 weeks, ORIF was done of the left humerus.    Vital Signs  Temp:  [97.4 °F (36.3 °C)-98.7 °F (37.1 °C)] 98 °F (36.7 °C)  Heart Rate:  [] 118  Resp:  [13-19] 18  BP: (128-160)/(51-76) 141/69  Body mass index is 18.08 kg/m².      Intake/Output Summary (Last 24 hours) at 04/08/18 1419  Last data filed at 04/08/18 1100   Gross per 24 hour   Intake             1200 ml   Output              700 ml   Net              500 ml     Intake & Output (last 3 days)       04/05 0701 - 04/06 0700 04/06 0701 - 04/07 0700 04/07 0701 - 04/08 0700 04/08 0701 - 04/09 0700    P.O.  200 600     I.V. (mL/kg)  985 (18.5)  600 (11.8)    IV Piggyback  1000      Total Intake(mL/kg)  2185 (41) 600 (11.8) 600 (11.8)    Urine (mL/kg/hr)  750 700 (0.6)     Total Output   750 700      Net   +1435 -100 +600                  Physical exam:  Physical Exam   Constitutional: Well-developed, well-nourished.  Pleasant.  No distress  Head: Normocephalic and atraumatic.  Hearing is intact, mucous membranes are moist.  Eyes:  Pupils are equal, round  Neck: No JVD is noted  Cardiovascular: Tachycardic regular rhythm without murmur rub or gallop  Pulmonary/Chest: Bilateral breath sounds no rhonchus rales or wheezing heard  (Cardiovascular and pulmonary exam continues to be hindered  due to the patient's shoulder mobilizer)  Abdominal: Soft, flat, bowel sounds are active, nondistended nontender.  No " peritoneal signs   Musculoskeletal: She can move her left fingers left shoulder and an immobilizer no edema  Neurological: Nonfocal, alert.  No definite tremor noted today   Skin: Skin is warm and dry.   Psychiatric:  Normal mood and affect.     Telemetry: Sinus and sinus tachycardia, currently 116    Echocardiogram with normal ejection fraction        Results Review:  Lab Results   Component Value Date    WBC 10.06 04/08/2018    HGB 10.1 (L) 04/08/2018    HCT 31.1 (L) 04/08/2018    MCV 95.4 (H) 04/08/2018     04/08/2018     Lab Results   Component Value Date    GLUCOSE 87 04/08/2018    BUN 16 04/08/2018    CREATININE 0.55 04/08/2018    EGFRIFNONA 109 04/08/2018    BCR 29.1 (H) 04/08/2018    CO2 24.7 04/08/2018    CALCIUM 9.3 04/08/2018    ALBUMIN 3.30 (L) 04/07/2018    LABIL2 1.2 (L) 04/07/2018    AST 26 04/07/2018    ALT 19 04/07/2018       Imaging Results (last 72 hours)     Procedure Component Value Units Date/Time    CT Upper Extremity Left Without Contrast [[id number]] Collected:  04/07/18 0737     Updated:  04/07/18 0740    Narrative:       EXAMINATION: CT UPPER EXTREMITY LEFT WO CONTRAST-      CLINICAL INDICATION:fall        TECHNIQUE: Multiple axial CT images were obtained through the LEFT  SHOULDER without IV contrast administration.  The axial CT data set was  used to generate high resolution reformatted images in the coronal and  sagittal planes to facilitate diagnostic accuracy and treatment  planning.      Radiation dose reduction techniques were utilized per ALARA protocol.  Automated exposure control was initiated through either or CareDoITM Power or  DoseRigDisruptive By Design software packages by  protocol.       169.74 mGy.cm     COMPARISON: X-ray from same day      FINDINGS:  Comminuted fracture is again noted of the left humeral neck. Humeral  head remains well located. There is minimal anterior displacement of the  distal fracture fragments in relation to the proximal humeral head  fracture  fragments.  No dislocation.  Degenerative changes noted including calcific tendinosis and AC joint  arthropathy.  Localized soft tissue edema and swelling is noted.  Partial imaging of the left lung demonstrates centrilobular nodularity  throughout most consistent with atypical pneumonia.       Impression:          1. Minimally displaced comminuted left humeral neck fracture as detailed  above.  2. Probable atypical pneumonia seen of the partially imaged left lung.     This report was finalized on 4/7/2018 7:38 AM by Dr. Viraj Appiah MD.       XR Chest 1 View [[id number]] Collected:  04/07/18 0735     Updated:  04/07/18 0738    Narrative:       EXAMINATION: XR CHEST 1 VW-      CLINICAL INDICATION:     pre-op     TECHNIQUE:  XR CHEST 1 VW-      COMPARISON: NONE      FINDINGS:   Chronic appearing interstitial lung changes.   Heart and mediastinal contours are unremarkable.   No pneumothorax.   No pleural effusion.   Left humerus neck fracture again noted.            Impression:       Chronic appearing interstitial lung changes. Otherwise no  acute cardiopulmonary findings.     This report was finalized on 4/7/2018 7:36 AM by Dr. Viraj Appiah MD.       XR Shoulder 2+ View Left [[id number]] Collected:  04/06/18 1523     Updated:  04/06/18 1623    Narrative:       EXAMINATION: XR SHOULDER 2+ VW LEFT-      CLINICAL INDICATION:fall        COMPARISON: None      TECHNIQUE: 2 views left shoulder     FINDINGS:   Left humerus neck fracture. Angulation without significant displacement.            Impression:       Angulated but nondisplaced left humeral neck fracture.     This report was finalized on 4/6/2018 3:24 PM by Dr. Viraj Appiah MD.       XR Knee 3 View Right [[id number]] Collected:  04/06/18 1524     Updated:  04/06/18 1623    Narrative:       EXAMINATION: XR KNEE 3 VW RIGHT-      CLINICAL INDICATION:fall        COMPARISON: None      TECHNIQUE: 3 views right knee     FINDINGS:   Fat fluid level suprapatellar bursa  "indicates lipohemarthrosis. Medial  tibial plateau fracture not displaced. No dislocation.           Impression:       Nondisplaced medial tibial plateau fracture with associated  lipohemarthrosis.     This report was finalized on 4/6/2018 3:24 PM by Dr. Viraj Appiah MD.         Postop x-ray of the shoulder image noted.      D/W Dr Aburto      Assessment/Plan     Active Problems:  Closed displaced comminuted fracture of shaft of left humerusAnd right tibial plateau fracture. Status post ORIF the shoulder, the tibial plateau fractures going to be treated conservatively but with nonweightbearing for 6 weeks.  Patient may need a rehabilitation stays this will be a very difficult situation with her having a left humeral fracture and a right tibial plateau fracture being nonweightbearing.    Leg and ankle pain, x-rays negative, will get venous Dopplers.  Patient apparently however was fairly active prior to this injury.    Mild tachycardia, I'm going to check an H&H and a troponin.  Possibly related to pain postoperatively.    Gray zone troponin, she does have occasional chest \"fluttering\", echocardiogram shows a normal ejection fraction. I will get a follow-up troponin.    Possible atypical pneumonia but a room air saturation is good.  White count  was elevated but this may have been related to her trauma. Treated with 7 days of doxycycline, white count is normal     Possible early parkinsonism with cogwheeling, shuffling gait by history and a masked face. Neuology    Macrocytic anemia.  Patient however appears to be possibly iron deficient, we'll begin iron therapy, patient may need outpatient endoscopic workup.      DVT prophylaxis,patient has SCDs, plan to start subcutaneous Lovenox tomorrow a.m.      Sang Epps MD  04/08/18  2:19 PM    "

## 2018-04-08 NOTE — ANESTHESIA PROCEDURE NOTES
Airway  Urgency: elective    Date/Time: 4/8/2018 9:05 AM  Airway not difficult    General Information and Staff    Patient location during procedure: OR  CRNA: ANAID MCKINNEY    Indications and Patient Condition  Indications for airway management: airway protection    Preoxygenated: yes  MILS not maintained throughout  Mask difficulty assessment: 0 - not attempted    Final Airway Details  Final airway type: endotracheal airway      Successful airway: ETT  Cuffed: yes   Successful intubation technique: direct laryngoscopy  Endotracheal tube insertion site: oral  Blade: Andrew  Blade size: #2  ETT size: 7.5 mm  Cormack-Lehane Classification: grade I - full view of glottis  Placement verified by: chest auscultation and capnometry   Measured from: lips  ETT to lips (cm): 22  Number of attempts at approach: 1    Additional Comments  Atraumatic ETT placement, dentition unchanged.

## 2018-04-08 NOTE — ANESTHESIA POSTPROCEDURE EVALUATION
Patient: Debra Resendiz    Procedure Summary     Date:  04/08/18 Room / Location:  [facility] OR 03 /  [facility] OR    Anesthesia Start:  0905 Anesthesia Stop:  1100    Procedure:  LEFT HUMERUS PROXIMAL OPEN REDUCTION INTERNAL FIXATION (Left Arm Upper) Diagnosis:       Closed displaced comminuted fracture of shaft of left humerus, initial encounter      (Closed displaced comminuted fracture of shaft of left humerus, initial encounter [S42.352A])    Surgeon:  Dani Aburto MD Provider:  Eliecer Tineo MD    Anesthesia Type:  general ASA Status:  2          Anesthesia Type: general  Last vitals  BP   152/76 (04/08/18 1135)   Temp   98.4 °F (36.9 °C) (04/08/18 1130)   Pulse   109 (04/08/18 1135)   Resp   14 (04/08/18 1135)     SpO2   100 % (04/08/18 1135)     Post Anesthesia Care and Evaluation    Patient location during evaluation: PHASE II  Patient participation: complete - patient participated  Level of consciousness: awake and alert  Pain score: 1  Pain management: adequate  Airway patency: patent  Anesthetic complications: No anesthetic complications  PONV Status: controlled  Cardiovascular status: acceptable  Respiratory status: acceptable  Hydration status: acceptable

## 2018-04-08 NOTE — ANESTHESIA PREPROCEDURE EVALUATION
Anesthesia Evaluation     Patient summary reviewed and Nursing notes reviewed   NPO Solid Status: > 8 hours  NPO Liquid Status: > 8 hours           Airway   Mallampati: II  TM distance: >3 FB  Neck ROM: full  no difficulty expected  Dental    (+) lower dentures and upper dentures    Pulmonary - negative pulmonary ROS   (+) decreased breath sounds,   Cardiovascular - negative cardio ROS    Rhythm: regular  Rate: normal        Neuro/Psych- negative ROS  GI/Hepatic/Renal/Endo - negative ROS     Musculoskeletal     (+) chronic pain,   Abdominal    Substance History - negative use     OB/GYN negative ob/gyn ROS         Other - negative ROS                       Anesthesia Plan    ASA 2     general     intravenous induction   Anesthetic plan and risks discussed with patient.  Use of blood products discussed with patient .   Plan discussed with CRNA.

## 2018-04-09 LAB
ANION GAP SERPL CALCULATED.3IONS-SCNC: 5.4 MMOL/L (ref 3.6–11.2)
BASOPHILS # BLD AUTO: 0.02 10*3/MM3 (ref 0–0.3)
BASOPHILS NFR BLD AUTO: 0.2 % (ref 0–2)
BUN BLD-MCNC: 15 MG/DL (ref 7–21)
BUN/CREAT SERPL: 25.9 (ref 7–25)
CALCIUM SPEC-SCNC: 9 MG/DL (ref 7.7–10)
CHLORIDE SERPL-SCNC: 102 MMOL/L (ref 99–112)
CO2 SERPL-SCNC: 26.6 MMOL/L (ref 24.3–31.9)
CREAT BLD-MCNC: 0.58 MG/DL (ref 0.43–1.29)
DEPRECATED RDW RBC AUTO: 44.4 FL (ref 37–54)
EOSINOPHIL # BLD AUTO: 0 10*3/MM3 (ref 0–0.7)
EOSINOPHIL NFR BLD AUTO: 0 % (ref 0–7)
ERYTHROCYTE [DISTWIDTH] IN BLOOD BY AUTOMATED COUNT: 13.3 % (ref 11.5–14.5)
GFR SERPL CREATININE-BSD FRML MDRD: 102 ML/MIN/1.73
GLUCOSE BLD-MCNC: 94 MG/DL (ref 70–110)
HCT VFR BLD AUTO: 27.4 % (ref 37–47)
HGB BLD-MCNC: 8.7 G/DL (ref 12–16)
IMM GRANULOCYTES # BLD: 0.02 10*3/MM3 (ref 0–0.03)
IMM GRANULOCYTES NFR BLD: 0.2 % (ref 0–0.5)
LYMPHOCYTES # BLD AUTO: 1.86 10*3/MM3 (ref 1–3)
LYMPHOCYTES NFR BLD AUTO: 19.2 % (ref 16–46)
MCH RBC QN AUTO: 31 PG (ref 27–33)
MCHC RBC AUTO-ENTMCNC: 31.8 G/DL (ref 33–37)
MCV RBC AUTO: 97.5 FL (ref 80–94)
MONOCYTES # BLD AUTO: 1.47 10*3/MM3 (ref 0.1–0.9)
MONOCYTES NFR BLD AUTO: 15.2 % (ref 0–12)
NEUTROPHILS # BLD AUTO: 6.3 10*3/MM3 (ref 1.4–6.5)
NEUTROPHILS NFR BLD AUTO: 65.2 % (ref 40–75)
OSMOLALITY SERPL CALC.SUM OF ELEC: 268.8 MOSM/KG (ref 273–305)
PLATELET # BLD AUTO: 323 10*3/MM3 (ref 130–400)
PMV BLD AUTO: 10 FL (ref 6–10)
POTASSIUM BLD-SCNC: 3.9 MMOL/L (ref 3.5–5.3)
RBC # BLD AUTO: 2.81 10*6/MM3 (ref 4.2–5.4)
SODIUM BLD-SCNC: 134 MMOL/L (ref 135–153)
WBC NRBC COR # BLD: 9.67 10*3/MM3 (ref 4.5–12.5)

## 2018-04-09 PROCEDURE — G8978 MOBILITY CURRENT STATUS: HCPCS

## 2018-04-09 PROCEDURE — 97163 PT EVAL HIGH COMPLEX 45 MIN: CPT

## 2018-04-09 PROCEDURE — G8979 MOBILITY GOAL STATUS: HCPCS

## 2018-04-09 PROCEDURE — 80048 BASIC METABOLIC PNL TOTAL CA: CPT | Performed by: INTERNAL MEDICINE

## 2018-04-09 PROCEDURE — 97530 THERAPEUTIC ACTIVITIES: CPT

## 2018-04-09 PROCEDURE — 85025 COMPLETE CBC W/AUTO DIFF WBC: CPT | Performed by: INTERNAL MEDICINE

## 2018-04-09 PROCEDURE — 97110 THERAPEUTIC EXERCISES: CPT

## 2018-04-09 PROCEDURE — 25010000002 ENOXAPARIN PER 10 MG: Performed by: ORTHOPAEDIC SURGERY

## 2018-04-09 PROCEDURE — 25010000002 HYDROMORPHONE PER 4 MG: Performed by: HOSPITALIST

## 2018-04-09 PROCEDURE — 99232 SBSQ HOSP IP/OBS MODERATE 35: CPT | Performed by: INTERNAL MEDICINE

## 2018-04-09 RX ADMIN — ENOXAPARIN SODIUM 40 MG: 40 INJECTION SUBCUTANEOUS at 08:32

## 2018-04-09 RX ADMIN — HYDROCODONE BITARTRATE AND ACETAMINOPHEN 1 TABLET: 5; 325 TABLET ORAL at 04:32

## 2018-04-09 RX ADMIN — CLINDAMYCIN PHOSPHATE 900 MG: 18 INJECTION, SOLUTION INTRAVENOUS at 08:32

## 2018-04-09 RX ADMIN — HYDROCODONE BITARTRATE AND ACETAMINOPHEN 1 TABLET: 5; 325 TABLET ORAL at 08:32

## 2018-04-09 RX ADMIN — HYDROCODONE BITARTRATE AND ACETAMINOPHEN 1 TABLET: 5; 325 TABLET ORAL at 17:13

## 2018-04-09 RX ADMIN — FERROUS SULFATE TAB 325 MG (65 MG ELEMENTAL FE) 325 MG: 325 (65 FE) TAB at 08:32

## 2018-04-09 RX ADMIN — CLINDAMYCIN PHOSPHATE 900 MG: 18 INJECTION, SOLUTION INTRAVENOUS at 00:33

## 2018-04-09 RX ADMIN — DOXYCYCLINE 100 MG: 100 CAPSULE ORAL at 21:14

## 2018-04-09 RX ADMIN — Medication 1 TABLET: at 08:32

## 2018-04-09 RX ADMIN — HYDROCODONE BITARTRATE AND ACETAMINOPHEN 1 TABLET: 5; 325 TABLET ORAL at 21:14

## 2018-04-09 RX ADMIN — DOXYCYCLINE 100 MG: 100 CAPSULE ORAL at 08:32

## 2018-04-09 RX ADMIN — HYDROMORPHONE HYDROCHLORIDE 0.5 MG: 1 INJECTION, SOLUTION INTRAMUSCULAR; INTRAVENOUS; SUBCUTANEOUS at 11:20

## 2018-04-09 RX ADMIN — HYDROCODONE BITARTRATE AND ACETAMINOPHEN 1 TABLET: 5; 325 TABLET ORAL at 00:33

## 2018-04-09 NOTE — PROGRESS NOTES
HOSPITALIST PROGRESS NOTE    Patient Identification:  Name:  Debra Resendiz  Age:  72 y.o.  Sex:  female  :  1945  MRN:  [id number]  Visit Number:  [id number]  Primary Care Provider:  No Known Provider    Length of stay:  3     HPI: 73 yo female admitted with L shoulder and R knee pain after a fall    Procedures:  -Left proximal humerus ORIF with plate and screws    Subjective:  Mean evening and was resting in bed.  She complains of pain in the left shoulder and right knee but states that the right knee is more painful.  She states that flexion and elevation of the right lower extremity increases her pain.  Patient did work with physical therapy today.  She denies any chest pain and/or shortness of breath.  She states that her pain is controlled at this time.    Present during exam: TAMEKA Torres    Current Hospital Meds:    doxycycline 100 mg Oral Q12H   enoxaparin 40 mg Subcutaneous Daily   ferrous sulfate 325 mg Oral Daily With Breakfast   multivitamin 1 tablet Oral Daily     Vital Signs  Temp:  [97.7 °F (36.5 °C)-98.6 °F (37 °C)] 98.2 °F (36.8 °C)  Heart Rate:  [] 94  Resp:  [18] 18  BP: (121-162)/(49-69) 133/49  1    18  2154 18  0328 18  0430   Weight: 53.3 kg (117 lb 8 oz) 50.8 kg (112 lb) 54.4 kg (120 lb)     Body mass index is 19.37 kg/m².    Physical exam:  Physical Exam   Constitutional: She is oriented to person, place, and time. She appears well-developed and well-nourished. No distress.   HENT:   Head: Normocephalic and atraumatic.   Mouth/Throat: Oropharynx is clear and moist.   Eyes: Conjunctivae and EOM are normal. Pupils are equal, round, and reactive to light.   Neck: Neck supple. No tracheal deviation present. No thyromegaly present.   Cardiovascular: Normal rate and regular rhythm.  Exam reveals no gallop and no friction rub.    No murmur heard.  Pulmonary/Chest: Breath sounds normal. No respiratory distress. She has no wheezes. She has no rales.   Cardiovascular and  pulmonary examination may be difficult due to left sided immobilizer that is in place   Abdominal: Soft. Bowel sounds are normal. She exhibits no distension. There is no tenderness. There is no guarding.   Musculoskeletal: She exhibits no edema.        Right knee: She exhibits decreased range of motion.   Right knee immobilizer in place   Neurological: She is alert and oriented to person, place, and time. No cranial nerve deficit.   Question of tremor in right lower extremityn extremity tremors noted on my exam; ANABEL VARGHESE   Skin: Skin is warm and dry. No rash noted. No erythema.   Psychiatric: She has a normal mood and affect.      Results Review:    Results from last 7 days  Lab Units 04/09/18  0215 04/08/18  1510 04/08/18  0318 04/07/18  0207 04/06/18  1920   WBC 10*3/mm3 9.67  --  10.06 11.72 16.10*   HEMOGLOBIN g/dL 8.7* 10.0* 10.1* 9.6* 10.2*   HEMATOCRIT % 27.4* 30.9* 31.1* 30.1* 31.1*   PLATELETS 10*3/mm3 323  --  306 330 353       Results from last 7 days  Lab Units 04/09/18  0215 04/08/18  0318 04/07/18  0208 04/06/18  1920   SODIUM mmol/L 134* 135 136 137   POTASSIUM mmol/L 3.9 3.9 4.0 4.2   CHLORIDE mmol/L 102 103 108 107   CO2 mmol/L 26.6 24.7 19.9* 25.8   BUN mg/dL 15 16 18 17   CREATININE mg/dL 0.58 0.55 0.64 0.64   CALCIUM mg/dL 9.0 9.3 9.0 9.3   GLUCOSE mg/dL 94 87 113* 109       Results from last 7 days  Lab Units 04/07/18  0208 04/06/18  1920   BILIRUBIN mg/dL 0.4 0.4   ALK PHOS U/L 80 86   AST (SGOT) U/L 26 27   ALT (SGPT) U/L 19 19           Results from last 7 days  Lab Units 04/07/18  0536 04/06/18  1920   INR  1.14* 1.04       Results from last 7 days  Lab Units 04/08/18  1510 04/07/18  0926 04/07/18  0208 04/06/18  2120   CK TOTAL U/L  --  112 112 97   TROPONIN I ng/mL 0.068* 0.131* 0.158* 0.101*   CK MB INDEX %  --  2.2 2.7 3.2*   MYOGLOBIN ng/mL  --  58.0 96.0 102.0       Results from last 7 days  Lab Units 04/06/18  1920   BNP pg/mL 71.0         Assessment/Plan     -Closed left proximal  humerus fracture status post ORIF, POD #1  -Acute blood loss anemia  -Right tibial plateau fracture status post fall with recommendations for conservative therapy with nonweightbearing bearing status ×6 weeks  -Indeterminate troponin with occasional palpitations; troponin level has improved  -Questionable atypical pneumonia patient on a course of doxycycline  -Mild tachycardia that appears to have improved  -And leg and ankle pain with negative Doppler ultrasound studies  -Questionable early parkinsonism     We will continue continue with doxycycline for now.  Continue with subcutaneous Lovenox.  Continue low-dose as needed analgesics for pain.  Continue to encourage physical and occupational therapy as tolerated.  The patient states that she prefers to return home with her son that I explained my concerns and stated that my preference to undergo rehabilitation after returning home.  The patient states she is agreeable for this.  I will place a consult for our inpatient physical rehabilitation.    A neurology consult has been placed.  Vitamin B12, folate and thyroid studies were within normal limits.    Continue to follow her hemoglobin and hematocrit closely and transfuse for hemoglobin less than 7.  I will repeat her CBC and BMP in the morning.    The patient is high risk due to the following diagnoses/reasons:  Left humerus fracture, right tibial plateau fracture, indeterminate troponin, question of parkinsonism    I discussed the patients findings and my recommendations with patient and nursing staff.    Disposition  Unclear; hopefully inpatient rehab prior to returning home      Анна Mai DO  04/09/18  7:04 PM

## 2018-04-09 NOTE — PROGRESS NOTES
LOS: 3 days   Patient Care Team:  No Known Provider as PCP - General    Post Op Day 1      Procedure(s):  LEFT HUMERUS PROXIMAL OPEN REDUCTION INTERNAL FIXATION     Subjective    Patient resting in bed quietly. Complains of right knee pain this AM. States left arm is throbbing pain. Denies cough, fever or chills this AM.   Interval History:     Patient Complaints: pain  Patient Denies:  N/v fever or chills  History taken from: patient    Review of Systems:    All systems were reviewed and negative except for:  Musculoskeletal: positive for  joint pain and joint swelling    Objective     Vital Signs  Temp:  [97.7 °F (36.5 °C)-99.7 °F (37.6 °C)] 99.7 °F (37.6 °C)  Heart Rate:  [] 93  Resp:  [14-18] 14  BP: (118-162)/(44-69) 118/44    Physical Exam:   General Appearance: alert, appears stated age and cooperative  Head: normocephalic, without obvious abnormality and atraumatic  Lungs: clear to auscultation, respirations regular, respirations even and respirations unlabored  Abdomen: normal bowel sounds, no masses, no hepatomegaly, no splenomegaly, soft non-tender, no guarding and no rebound tenderness  Extremities: moves extremities well, no cyanosis and no redness  Pulses: Pulses palpable and equal bilaterally   Dressing to the left upper extremity intact and dry. Good ROm all fingers and thumb Cap refill less than 3 secs. Sling and swathe intact. Right knee dressing CDI. Light touch sensation intact distally. Cap refill less than 3 secs. Immobilizer intact.   Results Review:   I reviewed the patient's new clinical results.    Medication Review: yes    Assessment/Plan     Active Problems:    Closed displaced comminuted fracture of shaft of left humerus      Plan: continue post op care as written. Encourage mobilization of left hand and right foot. Nonweightbearing to the right LE. Continue DVT prophylaxis. Follow up with Dr. Aburto in 2 weeks upon discharge.     Plan for disposition:Where: home and  [facility]    Sarah Shelley, APRN  04/09/18  7:04 PM      Time: 15 mins

## 2018-04-09 NOTE — PROGRESS NOTES
Discharge Planning Assessment   [facility]     Patient Name: Debra Resendiz  MRN: [id number]  Today's Date: 4/9/2018    Admit Date: 4/6/2018          Discharge Needs Assessment    No documentation.           Discharge Plan     Row Name 04/09/18 1627       Plan    Plan Pt admitted on 4/6/18.  Pt lives at home with son and plans to return home at discharge.  Pt currently does not uitlize home health services.  Pt currently uitlizes rolling walker.  Pt states she would prefer home health with physical therapy rather than rehab.  SS will follow.        Destination     No service coordination in this encounter.      Durable Medical Equipment     No service coordination in this encounter.      Dialysis/Infusion     No service coordination in this encounter.      Home Medical Care     No service coordination in this encounter.      Social Care     No service coordination in this encounter.                Demographic Summary    No documentation.           Functional Status    No documentation.           Psychosocial    No documentation.           Abuse/Neglect    No documentation.           Legal    No documentation.           Substance Abuse    No documentation.           Patient Forms    No documentation.         Meg Fang

## 2018-04-09 NOTE — THERAPY EVALUATION
Acute Care - Physical Therapy Initial Evaluation   [facility]     Patient Name: Debra Resendiz  : 1945  MRN: [id number]  Today's Date: 2018   Onset of Illness/Injury or Date of Surgery: 18  Date of Referral to PT: 18  Referring Physician: Dr. Aburto      Admit Date: 2018    Visit Dx:     ICD-10-CM ICD-9-CM   1. Closed displaced comminuted fracture of shaft of left humerus, initial encounter S42.352A 812.21   2. Right medial tibial plateau fracture, closed, initial encounter S82.131A 823.00     Patient Active Problem List   Diagnosis   • Closed displaced comminuted fracture of shaft of left humerus     History reviewed. No pertinent past medical history.  Past Surgical History:   Procedure Laterality Date   • HIP SURGERY          PT ASSESSMENT (last 72 hours)      Physical Therapy Evaluation     Row Name 18 1823          PT Evaluation Time/Intention    Subjective Information complains of;pain  -AG     Document Type evaluation;therapy note (daily note)  -AG     Mode of Treatment individual therapy;physical therapy   treated BID  -AG     Patient Effort adequate  -AG     Symptoms Noted During/After Treatment dizziness;increased pain  -AG     Row Name 18 1829          General Information    Patient Profile Reviewed? yes  -AG     Onset of Illness/Injury or Date of Surgery 18  -AG     Referring Physician Dr. Aburto  -     Patient Observations alert;cooperative  -AG     Patient/Family Observations pt. supine; L UE immobilized in sling; R knee immobilizer in place. Son present during PM session.  Pt. with flat affect; resting tremor L foot  -AG     Prior Level of Function independent:;community mobility;ADL's   indep. with ADL, community mobility; no assistive device  -AG     Equipment Currently Used at Home walker, rolling   has RW from previous hip surgery  -AG     Pertinent History of Current Functional Problem pt. slipped on wet grass; sustained L shoulder and R femur fx;  underwent ORIF of both  -AG     Existing Precautions/Restrictions brace worn when out of bed;fall;non-weight bearing;oxygen therapy device and L/min;weight bearing;other (see comments)   NWB L UE and R LE; L UE NASIR [place]   -AG     Risks Reviewed patient and family:;LOB;nausea/vomiting;dizziness;increased discomfort;change in vital signs  -AG     Benefits Reviewed patient and family:;improve function;increase independence;increase strength;increase balance;increase knowledge  -AG     Row Name 04/09/18 1823          Relationship/Environment    Primary Source of Support/Comfort child(titus)  -AG     Lives With child(titus), adult   pt. lives with her son  -AG     Row Name 04/09/18 1823          Resource/Environmental Concerns    Current Living Arrangements home/apartment/condo  -AG     Row Name 04/09/18 1823          Cognitive Assessment/Intervention- PT/OT    Orientation Status (Cognition) oriented x 3  -AG     Follows Commands (Cognition) follows one step commands;delayed response/completion;increased processing time needed;initiation impaired;physical/tactile prompts required  -AG     Safety Deficit (Cognitive) mild deficit;ability to follow commands;safety precautions awareness;safety precautions follow-through/compliance  -AG     Row Name 04/09/18 1823          Safety Issues, Functional Mobility    Safety Issues Affecting Function (Mobility) ability to follow commands;insight into deficits/self awareness  -     Row Name 04/09/18 1823          Mobility Assessment/Treatment    Extremity Weight-bearing Status left upper extremity;right upper extremity;left lower extremity;right lower extremity  -AG     Left Upper Extremity (Weight-bearing Status) non weight-bearing (NWB)  -AG     Right Upper Extremity (Weight-bearing Status) full weight-bearing (FWB)  -AG     Left Lower Extremity (Weight-bearing Status) weight-bearing as tolerated (WBAT)  -AG     Right Lower Extremity (Weight-bearing Status) non weight-bearing (NWB)   -AG     Row Name 04/09/18 1823          Bed Mobility Assessment/Treatment    Bed Mobility Assessment/Treatment rolling right;scooting/bridging;supine-sit;sit-supine  -AG     Rolling Right [place] (Bed Mobility) verbal cues;nonverbal cues (demo/gesture);maximum assist (25% patient effort);2 person assist  -AG     Scooting/Bridging [place] (Bed Mobility) verbal cues;nonverbal cues (demo/gesture);maximum assist (25% patient effort);2 person assist  -AG     Supine-Sit [place] (Bed Mobility) verbal cues;nonverbal cues (demo/gesture);maximum assist (25% patient effort);2 person assist  -AG     Sit-Supine [place] (Bed Mobility) verbal cues;nonverbal cues (demo/gesture);maximum assist (25% patient effort);2 person assist  -AG     Bed Mobility, Safety Issues cognitive deficits limit understanding;decreased use of arms for pushing/pulling;decreased use of legs for bridging/pushing;impaired trunk control for bed mobility  -AG     Row Name 04/09/18 1823          Transfer Assessment/Treatment    Transfer Assessment/Treatment other (see comments)   stand pivot transfer deferred d/t dizziness, hypotension  -AG     Maintains Weight-bearing Status (Transfers) able to maintain  -AG     Row Name 04/09/18 1823          Gait/Stairs Assessment/Training    [place] Level (Gait) unable to assess  -AG     Row Name 04/09/18 1823          General ROM    RT Lower Ext --   R ankle DF limited 25% AROM  -AG     LT Lower Ext --   L LE WFL; R UE AROM ~50%; L UE deferred  -AG     Row Name 04/09/18 1823          MMT (Manual Muscle Testing)    Additional Documentation --   L LE 4 to 4-/5  -AG     Row Name 04/09/18 1823          Motor Assessment/Intervention    Additional Documentation Balance (Group)  -AG     Row Name 04/09/18 1823          Balance    Balance static sitting balance  -AG     Row Name 04/09/18 1823          Static Sitting Balance    Level of [place] (Unsupported Sitting, Static Balance) contact guard  assist;minimal assist, 75% patient effort  -AG     Sitting Position (Unsupported Sitting, Static Balance) sitting on edge of bed  -AG     Time Able to Maintain Position (Unsupported Sitting, Static Balance) more than 5 minutes  -AG     Comment (Unsupported Sitting, Static Balance) leans posteriorly and to R  -AG     Level of [place] (Supported Sitting, Static Balance) supervision;contact guard assist  -     Row Name 04/09/18 1823          Sensory Assessment/Intervention    Sensory General Assessment other (see comments)  -AG     Additional Documentation --   L LE sensation to light touch intact  -     Row Name 04/09/18 1823          Vision Assessment/Intervention    Visual Impairment/Limitations WFL  -     Row Name 04/09/18 1823          Pain Assessment    Additional Documentation Pain Scale: FACES Pre/Post-Treatment (Group)  -[facility] Name 04/09/18 1823          Pain Scale: Numbers Pre/Post-Treatment    Pain Location - Side Bilateral  -AG     Pain Location - Orientation other (see comments)   L UE> R LE pain  -AG     Pain Intervention(s) Repositioned;Ambulation/increased activity;Rest  -     Row Name 04/09/18 1823          Pain Scale: FACES Pre/Post-Treatment    Pain: FACES Scale, Pretreatment 4-->hurts little more  -AG     Pain: FACES Scale, Post-Treatment 6-->hurts even more  -AG     Row Name             Wound 04/08/18 1047 Left arm incision    Wound - Properties Group Date first assessed: 04/08/18  -LT Time first assessed: 1047  -LT Side: Left  -LT Location: arm  -LT Type: incision  -LT    Row Name 04/09/18 1823          Coping    Observed Emotional State accepting  -     Row Name 04/09/18 1823          Plan of Care Review    Plan of Care Reviewed With patient;son  -     Row Name 04/09/18 1823          Physical Therapy Clinical Impression    Date of Referral to PT 04/09/18  -AG     PT Diagnosis (PT Clinical Impression) decr functional mobility; decr ROM, strength  -AG     Prognosis (PT  Clinical Impression) fair  -AG     Functional Level at Time of Evaluation (PT Clinical Impression) Max A x 2  -AG     Patient/Family Goals Statement (PT Clinical Impression) return to optimum function  -AG     Criteria for Skilled Interventions Met (PT Clinical Impression) yes;treatment indicated  -AG     Pathology/Pathophysiology Noted (Describe Specifically for Each System) musculoskeletal;neuromuscular  -AG     Impairments Found (describe specific impairments) ergonomics and body mechanics;gait, locomotion, and balance;joint integrity and mobility  -AG     Functional Limitations in Following Categories (Describe Specific Limitations) self-care;community/leisure  -AG     Rehab Potential (PT Clinical Summary) good, to achieve stated therapy goals  -AG     Predicted Duration of Therapy (PT) hospital LOS  -AG     Care Plan Review (PT) evaluation/treatment results reviewed;care plan/treatment goals reviewed;risks/benefits reviewed;current/potential barriers reviewed;patient/other agree to care plan  -AG     Care Plan Review, Other Participant (PT Clinical Impression) son  -AG     Row Name 04/09/18 1823          Vital Signs    Pre Systolic BP Rehab 121  -AG     Pre Treatment Diastolic BP 51  -AG     Intra Systolic BP Rehab 105  -AG     Intra Treatment Diastolic BP 45  -AG     Pre Patient Position Supine  -AG     Intra Patient Position Sitting  -AG     Row Name 04/09/18 1823          Physical Therapy Goals    Bed Mobility Goal Selection (PT) bed mobility, PT goal 1  -AG     Transfer Goal Selection (PT) transfer, PT goal 1  -AG     Row Name 04/09/18 1823          Bed Mobility Goal 1 (PT)    Activity/Assistive Device (Bed Mobility Goal 1, PT) rolling to left;rolling to right;sit to supine;supine to sit;bed rails  -AG     [place] Level/Cues Needed (Bed Mobility Goal 1, PT) moderate assist (50-74% patient effort)  -AG     Row Name 04/09/18 1823          Transfer Goal 1 (PT)    Activity/Assistive Device (Transfer Goal  1, PT) bed-to-chair/chair-to-bed  -AG     [place] Level/Cues Needed (Transfer Goal 1, PT) minimum assist (75% or more patient effort);moderate assist (50-74% patient effort);other (see comments)   x 2  -AG     Row Name 04/09/18 1823          Positioning and Restraints    Pre-Treatment Position in bed  -AG     Post Treatment Position bed  -AG     In Bed supine;call light within reach;encouraged to call for assist;side rails up x3;LUE elevated;legs elevated  -AG     Row Name 04/09/18 1823          Living Environment    Home Accessibility wheelchair accessible;other (see comments)   pt. reports exterior ramp present  -AG       User Key  (r) = Recorded By, (t) = Taken By, (c) = Cosigned By    Initials Name Provider Type    AG Nga Valera, PT Physical Therapist    LT Trisha Allan, RN Registered Nurse          Physical Therapy Education     Title: PT OT SLP Therapies (Done)     Topic: Physical Therapy (Done)     Point: Mobility training (Done)    Learning Progress Summary     Learner Status Readiness Method Response Comment Documented by    Patient Done Acceptance INGRID WHITNEY VU,NR  AG 04/09/18 1841    Family Done Acceptance INGRID WHITNEY VU,NR  AG 04/09/18 1841          Point: Home exercise program (Done)    Learning Progress Summary     Learner Status Readiness Method Response Comment Documented by    Patient Done Acceptance INGRID WHITNEY VU,NR  AG 04/09/18 1841    Family Done Acceptance INGRID WHITNEY VU,NR  AG 04/09/18 1841          Point: Body mechanics (Done)    Learning Progress Summary     Learner Status Readiness Method Response Comment Documented by    Patient Done Acceptance INGRID WHITNEY VU,NR  AG 04/09/18 1841    Family Done Acceptance INGRID WHITNEY VU,NR  AG 04/09/18 1841          Point: Precautions (Done)    Learning Progress Summary     Learner Status Readiness Method Response Comment Documented by    Patient Done Acceptance DEVONTED VU,NR  AG 04/09/18 1841    Family Done Acceptance DEVONTED VU,NR  AG 04/09/18 1841                      User Key     Initials  Effective Dates Name Provider Type Discipline     04/03/18 -  Nga Valera PT Physical Therapist PT                PT Recommendation and Plan  Anticipated Discharge Disposition (PT): inpatient rehab facility, skilled nursing facility (SNF), still a patient  Planned Therapy Interventions (PT Eval): balance training, bed mobility training, home exercise program, manual therapy techniques, neuromuscular re-education, patient/family education, postural re-education, ROM (range of motion), strengthening, transfer training  Therapy Frequency (PT Clinical Impression): 5 times/wk (1-2 times/ day per priority policy)  Outcome Summary/Treatment Plan (PT)  Anticipated Equipment Needs at Discharge (PT): wheelchair  Anticipated Discharge Disposition (PT): inpatient rehab facility, skilled nursing facility (SNF), still a patient  Plan of Care Reviewed With: patient, son          Outcome Measures     Row Name 04/09/18 1800             How much help from another person do you currently need...    Turning from your back to your side while in flat bed without using bedrails? 2  -AG      Moving from lying on back to sitting on the side of a flat bed without bedrails? 2  -AG      Moving to and from a bed to a chair (including a wheelchair)? 2  -AG      Standing up from a chair using your arms (e.g., wheelchair, bedside chair)? 1  -AG      Climbing 3-5 steps with a railing? 1  -AG      To walk in hospital room? 1  -AG      AM-PAC 6 Clicks Score 9  -AG         Functional Assessment    Outcome Measure Options AM-PAC 6 Clicks Basic Mobility (PT)  -AG        User Key  (r) = Recorded By, (t) = Taken By, (c) = Cosigned By    Initials Name Provider Type    AG Nga Valera, PT Physical Therapist           Time Calculation:         PT Charges     Row Name 04/09/18 1843             Time Calculation    PT Received On 04/09/18  -      PT - Next Appointment 04/10/18  -      PT Goal Re-Cert Due Date 04/23/18  -         Time Calculation- PT     TCU Minutes- PT 55 min  -AG        User Key  (r) = Recorded By, (t) = Taken By, (c) = Cosigned By    Initials Name Provider Type    AG Nga Valera, PT Physical Therapist          Therapy Charges for Today     Code Description Service Date Service Provider Modifiers Qty    50037385120 HC PT MOBILITY CURRENT 4/9/2018 Nga Valera, PT GP, CL 1    90232104664 HC PT MOBILITY PROJECTED 4/9/2018 Nga Valera, PT GP, CJ 1    32809645000 HC PT EVAL HIGH COMPLEXITY 1 4/9/2018 Nga Valera, PT GP 1    66198739260 HC PT THER PROC EA 15 MIN 4/9/2018 Nga Valera, PT GP 2    04568383873 HC PT THERAPEUTIC ACT EA 15 MIN 4/9/2018 Nga Valera, PT GP 1    36202829060 HC PT THER SUPP EA 15 MIN 4/9/2018 Nga Valera, PT GP 3          PT G-Codes  Outcome Measure Options: AM-PAC 6 Clicks Basic Mobility (PT)  Score: 9  Functional Limitation: Mobility: Walking and moving around  Mobility: Walking and Moving Around Current Status (): At least 60 percent but less than 80 percent impaired, limited or restricted  Mobility: Walking and Moving Around Goal Status (): At least 20 percent but less than 40 percent impaired, limited or restricted      Nga Valera PT  4/9/2018

## 2018-04-09 NOTE — PLAN OF CARE
Problem: Fall Risk (Adult)  Goal: Identify Related Risk Factors and Signs and Symptoms  Outcome: Ongoing (interventions implemented as appropriate)    Goal: Absence of Fall  Outcome: Ongoing (interventions implemented as appropriate)      Problem: Pain, Acute (Adult)  Goal: Acceptable Pain Control/Comfort Level  Outcome: Ongoing (interventions implemented as appropriate)      Problem: Fracture Orthopaedic (Adult)  Goal: Signs and Symptoms of Listed Potential Problems Will be Absent, Minimized or Managed (Fracture Orthopaedic)  Outcome: Ongoing (interventions implemented as appropriate)      Problem: Patient Care Overview  Goal: Plan of Care Review  Outcome: Ongoing (interventions implemented as appropriate)    Goal: Discharge Needs Assessment  Outcome: Ongoing (interventions implemented as appropriate)    Goal: Interprofessional Rounds/Family Conf  Outcome: Ongoing (interventions implemented as appropriate)      Problem: Patient Care Overview  Goal: Individualization and Mutuality  Outcome: Ongoing (interventions implemented as appropriate)    Goal: Discharge Needs Assessment  Outcome: Ongoing (interventions implemented as appropriate)    Goal: Interprofessional Rounds/Family Conf  Outcome: Ongoing (interventions implemented as appropriate)      Problem: Skin Injury Risk (Adult)  Goal: Identify Related Risk Factors and Signs and Symptoms  Outcome: Ongoing (interventions implemented as appropriate)    Goal: Skin Health and Integrity  Outcome: Ongoing (interventions implemented as appropriate)

## 2018-04-10 LAB
ANION GAP SERPL CALCULATED.3IONS-SCNC: 2.8 MMOL/L (ref 3.6–11.2)
BASOPHILS # BLD AUTO: 0.02 10*3/MM3 (ref 0–0.3)
BASOPHILS NFR BLD AUTO: 0.3 % (ref 0–2)
BUN BLD-MCNC: 15 MG/DL (ref 7–21)
BUN/CREAT SERPL: 33.3 (ref 7–25)
CALCIUM SPEC-SCNC: 9.2 MG/DL (ref 7.7–10)
CHLORIDE SERPL-SCNC: 103 MMOL/L (ref 99–112)
CO2 SERPL-SCNC: 31.2 MMOL/L (ref 24.3–31.9)
CREAT BLD-MCNC: 0.45 MG/DL (ref 0.43–1.29)
DEPRECATED RDW RBC AUTO: 45.2 FL (ref 37–54)
EOSINOPHIL # BLD AUTO: 0.07 10*3/MM3 (ref 0–0.7)
EOSINOPHIL NFR BLD AUTO: 0.9 % (ref 0–7)
ERYTHROCYTE [DISTWIDTH] IN BLOOD BY AUTOMATED COUNT: 13.4 % (ref 11.5–14.5)
GFR SERPL CREATININE-BSD FRML MDRD: 137 ML/MIN/1.73
GLUCOSE BLD-MCNC: 94 MG/DL (ref 70–110)
HCT VFR BLD AUTO: 27.7 % (ref 37–47)
HGB BLD-MCNC: 8.9 G/DL (ref 12–16)
IMM GRANULOCYTES # BLD: 0.01 10*3/MM3 (ref 0–0.03)
IMM GRANULOCYTES NFR BLD: 0.1 % (ref 0–0.5)
LYMPHOCYTES # BLD AUTO: 1.35 10*3/MM3 (ref 1–3)
LYMPHOCYTES NFR BLD AUTO: 17.4 % (ref 16–46)
MCH RBC QN AUTO: 31.7 PG (ref 27–33)
MCHC RBC AUTO-ENTMCNC: 32.1 G/DL (ref 33–37)
MCV RBC AUTO: 98.6 FL (ref 80–94)
MONOCYTES # BLD AUTO: 0.85 10*3/MM3 (ref 0.1–0.9)
MONOCYTES NFR BLD AUTO: 10.9 % (ref 0–12)
NEUTROPHILS # BLD AUTO: 5.48 10*3/MM3 (ref 1.4–6.5)
NEUTROPHILS NFR BLD AUTO: 70.4 % (ref 40–75)
OSMOLALITY SERPL CALC.SUM OF ELEC: 274.4 MOSM/KG (ref 273–305)
PLATELET # BLD AUTO: 345 10*3/MM3 (ref 130–400)
PMV BLD AUTO: 9.7 FL (ref 6–10)
POTASSIUM BLD-SCNC: 3.9 MMOL/L (ref 3.5–5.3)
RBC # BLD AUTO: 2.81 10*6/MM3 (ref 4.2–5.4)
SODIUM BLD-SCNC: 137 MMOL/L (ref 135–153)
WBC NRBC COR # BLD: 7.78 10*3/MM3 (ref 4.5–12.5)

## 2018-04-10 PROCEDURE — 80048 BASIC METABOLIC PNL TOTAL CA: CPT | Performed by: INTERNAL MEDICINE

## 2018-04-10 PROCEDURE — 94799 UNLISTED PULMONARY SVC/PX: CPT

## 2018-04-10 PROCEDURE — 25010000002 HYDROMORPHONE PER 4 MG: Performed by: HOSPITALIST

## 2018-04-10 PROCEDURE — 97167 OT EVAL HIGH COMPLEX 60 MIN: CPT

## 2018-04-10 PROCEDURE — G8987 SELF CARE CURRENT STATUS: HCPCS

## 2018-04-10 PROCEDURE — 97530 THERAPEUTIC ACTIVITIES: CPT

## 2018-04-10 PROCEDURE — G8988 SELF CARE GOAL STATUS: HCPCS

## 2018-04-10 PROCEDURE — 85025 COMPLETE CBC W/AUTO DIFF WBC: CPT | Performed by: INTERNAL MEDICINE

## 2018-04-10 PROCEDURE — 99232 SBSQ HOSP IP/OBS MODERATE 35: CPT | Performed by: INTERNAL MEDICINE

## 2018-04-10 PROCEDURE — 25010000002 ENOXAPARIN PER 10 MG: Performed by: ORTHOPAEDIC SURGERY

## 2018-04-10 RX ADMIN — HYDROMORPHONE HYDROCHLORIDE 0.5 MG: 1 INJECTION, SOLUTION INTRAMUSCULAR; INTRAVENOUS; SUBCUTANEOUS at 18:16

## 2018-04-10 RX ADMIN — DOXYCYCLINE 100 MG: 100 CAPSULE ORAL at 09:02

## 2018-04-10 RX ADMIN — CARBIDOPA AND LEVODOPA 1 TABLET: 25; 100 TABLET ORAL at 21:42

## 2018-04-10 RX ADMIN — CARBIDOPA AND LEVODOPA 1 TABLET: 25; 100 TABLET ORAL at 09:01

## 2018-04-10 RX ADMIN — HYDROCODONE BITARTRATE AND ACETAMINOPHEN 1 TABLET: 5; 325 TABLET ORAL at 12:26

## 2018-04-10 RX ADMIN — ENOXAPARIN SODIUM 40 MG: 40 INJECTION SUBCUTANEOUS at 09:02

## 2018-04-10 RX ADMIN — HYDROMORPHONE HYDROCHLORIDE 0.5 MG: 1 INJECTION, SOLUTION INTRAMUSCULAR; INTRAVENOUS; SUBCUTANEOUS at 01:24

## 2018-04-10 RX ADMIN — CARBIDOPA AND LEVODOPA 1 TABLET: 25; 100 TABLET ORAL at 16:40

## 2018-04-10 RX ADMIN — DOXYCYCLINE 100 MG: 100 CAPSULE ORAL at 21:42

## 2018-04-10 RX ADMIN — FERROUS SULFATE TAB 325 MG (65 MG ELEMENTAL FE) 325 MG: 325 (65 FE) TAB at 09:02

## 2018-04-10 RX ADMIN — HYDROCODONE BITARTRATE AND ACETAMINOPHEN 1 TABLET: 5; 325 TABLET ORAL at 21:42

## 2018-04-10 RX ADMIN — Medication 1 TABLET: at 09:02

## 2018-04-10 NOTE — PLAN OF CARE
Problem: Patient Care Overview  Goal: Plan of Care Review   04/10/18 1543   Coping/Psychosocial   Plan of Care Reviewed With patient   Coping/Psychosocial   Patient Agreement with Plan of Care agrees   OTHER   Outcome Summary Patient conitnues to require maximum assistance for all functional mobility and transferring. Conitnue current POC to tolerance for further improvements and strengtheing.

## 2018-04-10 NOTE — THERAPY TREATMENT NOTE
Acute Care - Physical Therapy Treatment Note  [facility]     Patient Name: Debra Resendiz  : 1945  MRN: [id number]  Today's Date: 4/10/2018  Onset of Illness/Injury or Date of Surgery: 18  Date of Referral to PT: 18  Referring Physician: Dr. Aburto    Admit Date: 2018    Visit Dx:    ICD-10-CM ICD-9-CM   1. Closed displaced comminuted fracture of shaft of left humerus, initial encounter S42.352A 812.21   2. Right medial tibial plateau fracture, closed, initial encounter S82.131A 823.00     Patient Active Problem List   Diagnosis   • Closed displaced comminuted fracture of shaft of left humerus       Therapy Treatment          Rehabilitation Treatment Summary     Row Name 04/10/18 1537             Treatment Time/Intention    Document Type therapy note (daily note)  -RF      Subjective Information complains of;pain  -RF      Mode of Treatment individual therapy;physical therapy   treated BID  -RF      Therapy Frequency (PT Clinical Impression) 5 times/wk   1-2 times/ day per priority policy  -RF      Patient Effort adequate  -RF      Existing Precautions/Restrictions brace worn when out of bed;fall;non-weight bearing;oxygen therapy device and L/min;weight bearing;other (see comments)   NWB L UE and R LE; L UE sling, R KI   -RF      Patient Response to Treatment Patient demonstrates need for maximal/dependent assistance with all mobility and transferring. Pt educated on importance of OOB daily and importance of complaince with HEP for conitnued strengthening.   -RF      Recorded by [RF] Marta Mckeon, CHIOMA 04/10/18 1542 04/10/18 1542      Row Name 04/10/18 1537             Cognitive Assessment/Intervention- PT/OT    Orientation Status (Cognition) oriented x 3  -RF      Follows Commands (Cognition) follows one step commands;delayed response/completion;increased processing time needed;initiation impaired;physical/tactile prompts required  -RF      Recorded by [RF] Marta Mckeon, CHIOMA 04/10/18 1540  04/10/18 1542      Row Name 04/10/18 1537             Mobility Assessment/Intervention    Extremity Weight-bearing Status left upper extremity;right upper extremity;left lower extremity;right lower extremity  -RF      Left Upper Extremity (Weight-bearing Status) non weight-bearing (NWB)  -RF      Right Upper Extremity (Weight-bearing Status) full weight-bearing (FWB)  -RF      Left Lower Extremity (Weight-bearing Status) weight-bearing as tolerated (WBAT)  -RF      Right Lower Extremity (Weight-bearing Status) non weight-bearing (NWB)  -RF      Recorded by [RF] Marta Mckeon, PTA 04/10/18 1542 04/10/18 1542      Row Name 04/10/18 1537             Bed Mobility Assessment/Treatment    Bed Mobility Assessment/Treatment rolling right;scooting/bridging;supine-sit;sit-supine  -RF      Rolling Right [place] (Bed Mobility) verbal cues;nonverbal cues (demo/gesture);maximum assist (25% patient effort);2 person assist  -RF      Scooting/Bridging [place] (Bed Mobility) verbal cues;nonverbal cues (demo/gesture);maximum assist (25% patient effort);2 person assist  -RF      Supine-Sit [place] (Bed Mobility) verbal cues;nonverbal cues (demo/gesture);maximum assist (25% patient effort);2 person assist  -RF      Sit-Supine [place] (Bed Mobility) verbal cues;nonverbal cues (demo/gesture);maximum assist (25% patient effort);2 person assist  -RF      Bed Mobility, Safety Issues cognitive deficits limit understanding;decreased use of arms for pushing/pulling;decreased use of legs for bridging/pushing;impaired trunk control for bed mobility  -RF      Recorded by [RF] Marta Mckeon, PTA 04/10/18 1542 04/10/18 1542      Row Name 04/10/18 1537             Transfer Assessment/Treatment    Transfer Assessment/Treatment other (see comments);bed-chair transfer;chair-bed transfer  -RF      Maintains Weight-bearing Status (Transfers) able to maintain  -RF      Bed-Chair [place] (Transfers) maximum assist (25% patient  effort);2 person assist;nonverbal cues (demo/gesture);verbal cues  -RF      Chair-Bed [place] (Transfers) maximum assist (25% patient effort);2 person assist;nonverbal cues (demo/gesture);verbal cues  -RF      Assistive Device (Bed-Chair Transfers) --   stand-pivot  -RF      Recorded by [RF] Marta Mckeon, PTA 04/10/18 1542 04/10/18 1542      Row Name 04/10/18 1537             Chair-Bed Transfer    Assistive Device (Chair-Bed Transfers) --   stand-pivot  -RF      Recorded by [RF] Marta Mckeon, PTA 04/10/18 1542 04/10/18 1542      Row Name 04/10/18 1537             Gait/Stairs Assessment/Training    [place] Level (Gait) unable to assess  -RF      Recorded by [RF] Marta Mckeon, PTA 04/10/18 1542 04/10/18 1542      Row Name 04/10/18 1537             Lower Extremity Seated Therapeutic Exercise    Performed, Seated Lower Extremity (Therapeutic Exercise) --   GS, AP, QS  -RF      Exercise Type, Seated Lower Extremity (Therapeutic Exercise) AROM (active range of motion)  -RF      Expected Outcomes, Seated Lower Extremity (Therapeutic Exercise) other (see comments);improve functional tolerance, community activity;improve functional tolerance, self-care activity;improve functional tolerance, household activity;improve performance, gait skills   strengthening  -RF      Recorded by [RF] Marta Mckeon, PTA 04/10/18 1542 04/10/18 1542      Row Name 04/10/18 1537             Positioning and Restraints    Pre-Treatment Position in bed  -RF      Post Treatment Position chair  -RF      In Bed supine;call light within reach;encouraged to call for assist;exit alarm on;with family/caregiver   PM  -RF      In Chair reclined;call light within reach;encouraged to call for assist;exit alarm on;with family/caregiver   AM  -RF      Recorded by [RF] Marta Mckeon, PTA 04/10/18 1542 04/10/18 1542      Row Name 04/10/18 1537             Pain Scale: Numbers Pre/Post-Treatment    Pain Location - Side Bilateral  -RF       Pain Location - Orientation other (see comments)   L UE> R LE pain  -RF      Recorded by [RF] Marta Mckeon, PTA 04/10/18 1542 04/10/18 1542      Row Name 04/10/18 1537             Pain Scale: FACES Pre/Post-Treatment    Pain: FACES Scale, Pretreatment 4-->hurts little more  -RF      Pain: FACES Scale, Post-Treatment 6-->hurts even more  -RF      Recorded by [RF] Marta Mckeon, PTA 04/10/18 1542 04/10/18 1542      Row Name 04/10/18 1537             Sensory Assessment/Intervention    Sensory General Assessment other (see comments)  -RF      Recorded by [RF] Marta Mckeon, PTA 04/10/18 1542 04/10/18 1542      Row Name 04/10/18 1537             Vision Assessment/Intervention    Visual Impairment/Limitations WFL  -RF      Recorded by [RF] Marta Mckeon, PTA 04/10/18 1542 04/10/18 1542      Row Name                Wound 04/08/18 1047 Left arm incision    Wound - Properties Group Date first assessed: 04/08/18 [LT] Time first assessed: 1047 [LT] Side: Left [LT] Location: arm [LT] Type: incision [LT] Recorded by:  [LT] Trisha Allan RN 04/08/18 1047 04/08/18 1047    Row Name 04/10/18 1537             Coping    Observed Emotional State accepting  -RF      Recorded by [RF] Marta Mckeon, PTA 04/10/18 1542 04/10/18 1542      Row Name 04/10/18 1537             Outcome Summary/Treatment Plan (PT)    Anticipated Equipment Needs at Discharge (PT) wheelchair  -RF      Anticipated Discharge Disposition (PT) inpatient rehab facility;skilled nursing facility (SNF);still a patient  -RF      Recorded by [RF] Marta Mckeon, PTA 04/10/18 1542 04/10/18 1542        User Key  (r) = Recorded By, (t) = Taken By, (c) = Cosigned By    Initials Name Effective Dates Discipline    LT Trisha Allan RN 06/16/16 -  Nurse    RF Marta Mckeon, PTA 03/07/18 -  PT          Wound 04/08/18 1047 Left arm incision (Active)   Dressing Appearance dry;intact 4/10/2018  7:05 AM   Closure [place] 4/10/2018  7:05 AM              Physical Therapy Education     Title: PT OT SLP Therapies (Done)     Topic: Physical Therapy (Done)     Point: Mobility training (Done)    Learning Progress Summary     Learner Status Readiness Method Response Comment Documented by    Patient Done Acceptance E VU   04/10/18 1543     Done Acceptance E,D VU,NR   04/09/18 1841    Family Done Acceptance E,D VU,NR   04/09/18 1841          Point: Home exercise program (Done)    Learning Progress Summary     Learner Status Readiness Method Response Comment Documented by    Patient Done Acceptance E VU   04/10/18 1543     Done Acceptance E,D VU,NR  AG 04/09/18 1841    Family Done Acceptance E,D VU,NR  AG 04/09/18 1841          Point: Body mechanics (Done)    Learning Progress Summary     Learner Status Readiness Method Response Comment Documented by    Patient Done Acceptance E VU   04/10/18 1543     Done Acceptance E,D VU,NR   04/09/18 1841    Family Done Acceptance E,D VU,NR   04/09/18 1841          Point: Precautions (Done)    Learning Progress Summary     Learner Status Readiness Method Response Comment Documented by    Patient Done Acceptance E VU   04/10/18 1543     Done Acceptance E,D VU,NR   04/09/18 1841    Family Done Acceptance E,D VU,NR   04/09/18 1841                      User Key     Initials Effective Dates Name Provider Type Discipline     04/03/18 -  Nga Valera, PT Physical Therapist PT     03/07/18 -  Marta Mckeon PTA Physical Therapy Assistant PT                    PT Recommendation and Plan  Anticipated Discharge Disposition (PT): inpatient rehab facility, skilled nursing facility (SNF), still a patient  Therapy Frequency (PT Clinical Impression): 5 times/wk (1-2 times/ day per priority policy)  Outcome Summary/Treatment Plan (PT)  Anticipated Equipment Needs at Discharge (PT): wheelchair  Anticipated Discharge Disposition (PT): inpatient rehab facility, skilled nursing facility (SNF), still a patient  Plan of  Care Reviewed With: patient  Outcome Summary: Patient conitnues to require maximum assistance for all functional mobility and transferring. Conitnue current POC to tolerance for further improvements and strengtheing.           Outcome Measures     Row Name 04/10/18 1523 04/10/18 1500 04/09/18 1800       How much help from another person do you currently need...    Turning from your back to your side while in flat bed without using bedrails?  --  -- 2  -AG    Moving from lying on back to sitting on the side of a flat bed without bedrails?  --  -- 2  -AG    Moving to and from a bed to a chair (including a wheelchair)?  --  -- 2  -AG    Standing up from a chair using your arms (e.g., wheelchair, bedside chair)?  --  -- 1  -AG    Climbing 3-5 steps with a railing?  --  -- 1  -AG    To walk in hospital room?  --  -- 1  -AG    AM-PAC 6 Clicks Score  --  -- 9  -AG       How much help from another is currently needed...    Putting on and taking off regular lower body clothing? 1  -KR  --  --    Bathing (including washing, rinsing, and drying) 1  -KR  --  --    Toileting (which includes using toilet bed pan or urinal) 1  -KR  --  --    Putting on and taking off regular upper body clothing 1  -KR  --  --    Taking care of personal grooming (such as brushing teeth) 2  -KR  --  --    Eating meals 2  -KR  --  --    Score 8  -KR  --  --       Functional Assessment    Outcome Measure Options  -- AM-PAC 6 Clicks Daily Activity (OT)  -KR AM-PAC 6 Clicks Basic Mobility (PT)  -AG      User Key  (r) = Recorded By, (t) = Taken By, (c) = Cosigned By    Initials Name Provider Type    AG Nga Valera, PT Physical Therapist    KR Viraj Ignacio, OT Occupational Therapist           Time Calculation:         PT Charges     Row Name 04/10/18 1545 04/10/18 1544          Time Calculation    PT Received On 04/10/18  -RF 04/10/18  -RF     PT - Next Appointment 04/11/18  -RF 04/10/18  -RF     PT Goal Re-Cert Due Date 04/23/18  -RF 04/23/18  -RF         Time Calculation- PT    TCU Minutes- PT 24 min  -RF 24 min  -RF       User Key  (r) = Recorded By, (t) = Taken By, (c) = Cosigned By    Initials Name Provider Type    RF Marta Mckeon PTA Physical Therapy Assistant          Therapy Charges for Today     Code Description Service Date Service Provider Modifiers Qty    48312432648 HC PT THERAPEUTIC ACT EA 15 MIN 4/10/2018 Marta Mckeon PTA GP 4    14003037319 HC PT THER SUPP EA 15 MIN 4/10/2018 Marta Mckeon PTA GP 4          PT G-Codes  Outcome Measure Options: AM-PAC 6 Clicks Daily Activity (OT)  Score: 9  Functional Limitation: Mobility: Walking and moving around  Mobility: Walking and Moving Around Current Status (): At least 60 percent but less than 80 percent impaired, limited or restricted  Mobility: Walking and Moving Around Goal Status (): At least 20 percent but less than 40 percent impaired, limited or restricted    Marta Mckeon PTA  4/10/2018

## 2018-04-10 NOTE — THERAPY EVALUATION
Acute Care - Occupational Therapy Initial Evaluation   [facility]     Patient Name: Debra Resendiz  : 1945  MRN: [id number]  Today's Date: 4/10/2018  Onset of Illness/Injury or Date of Surgery: 18     Referring Physician: Dr. Aburto    Admit Date: 2018       ICD-10-CM ICD-9-CM   1. Closed displaced comminuted fracture of shaft of left humerus, initial encounter S42.352A 812.21   2. Right medial tibial plateau fracture, closed, initial encounter S82.131A 823.00     Patient Active Problem List   Diagnosis   • Closed displaced comminuted fracture of shaft of left humerus     History reviewed. No pertinent past medical history.  Past Surgical History:   Procedure Laterality Date   • HIP SURGERY            OT ASSESSMENT FLOWSHEET (last 72 hours)      Occupational Therapy Evaluation     Row Name 04/10/18 1505                   OT Evaluation Time/Intention    Document Type evaluation  -KR        Mode of Treatment occupational therapy  -KR        Patient Effort adequate  -KR           General Information    Patient/Family Observations Son present for evaluation. Reports that he is available as needed for assist in all areas following discharge to home. Reports pt. has been previously independent with self care and mobility.   -KR           Cognitive Assessment/Intervention- PT/OT    Orientation Status (Cognition) oriented x 3  -KR        Follows Commands (Cognition) WFL  -KR           ADL Assessment/Intervention    BADL Assessment/Intervention upper body dressing;lower body dressing;grooming;feeding  -KR           Upper Body Dressing Assessment/Training    Upper Body Dressing Flat [place] Level dependent (less than 25% patient effort)  -KR           Lower Body Dressing Assessment/Training    Lower Body Dressing [place] Level dependent (less than 25% patient effort)  -KR           Grooming Assessment/Training    [place]at [place] Level (Grooming) dependent (less than 25% patient effort)  -KR            Self-Feeding Assessment/Training    [place] Level (Feeding) maximum assist (25% patient effort)  -KR           General ROM    RT Upper Ext --   3/5  -KR        LT Upper Ext --   deferred  -KR        GENERAL ROM COMMENTS --   LUE immobilized in sling. Hand/elbow to be assessed  -KR           MMT (Manual Muscle Testing)    Additional Documentation --   RUE 3-/5, LUE deferred  -KR           Wound 04/08/18 1047 Left arm incision    Wound - Properties Group Date first assessed: 04/08/18  -LT Time first assessed: 1047  -LT Side: Left  -LT Location: arm  -LT Type: incision  -LT       Planned OT Interventions    Planned Therapy Interventions (OT Eval) ROM/therapeutic exercise;strengthening exercise;adaptive equipment training  -KR           OT Goals    Self-Feeding Goal Selection (OT) self feeding, OT goal 1  -KR        ROM Goal Selection (OT) ROM, OT goal 1  -KR        Additional Documentation Self-Feeding Goal Selection (OT) (Row);ROM Goal Selection (OT) (Row)  -KR           Self-Feeding Goal 1 (OT)    Activity/Assistive Device (Self-Feeding Goal 1, OT) self-feeding skills, all  -KR        [place] Level/Cues Needed (Self-Feeding Goal 1, OT) supervision required  -KR        Time Frame (Self-Feeding Goal 1, OT) by discharge  -KR           ROM Goal 1 (OT)    ROM Goal 1 (OT) --   L hand/elbow WFL AROM  -KR        Time Frame (ROM Goal 1, OT) by discharge  -KR           Patient Education Goal (OT)    Activity (Patient Education Goal, OT) --   Pt./family ind. with LUE orthosist for positioning  -KR        [place]/Cues/Accuracy (Memory Goal 2, OT) demonstrates adequately;verbalizes understanding  -KR        Time Frame (Patient Education Goal, OT) by discharge  -KR          User Key  (r) = Recorded By, (t) = Taken By, (c) = Cosigned By    Initials Name Effective Dates    KR Viraj Ignacio OT 04/03/18 -     LT Trisha Allan RN 06/16/16 -                  OT Recommendation and Plan  Planned Therapy Interventions  (OT Eval): ROM/therapeutic exercise, strengthening exercise, adaptive equipment training             Outcome Measures     Row Name 04/10/18 1523 04/10/18 1500 04/09/18 1800       How much help from another person do you currently need...    Turning from your back to your side while in flat bed without using bedrails?  --  -- 2  -AG    Moving from lying on back to sitting on the side of a flat bed without bedrails?  --  -- 2  -AG    Moving to and from a bed to a chair (including a wheelchair)?  --  -- 2  -AG    Standing up from a chair using your arms (e.g., wheelchair, bedside chair)?  --  -- 1  -AG    Climbing 3-5 steps with a railing?  --  -- 1  -AG    To walk in hospital room?  --  -- 1  -AG    AM-PAC 6 Clicks Score  --  -- 9  -AG       How much help from another is currently needed...    Putting on and taking off regular lower body clothing? 1  -KR  --  --    Bathing (including washing, rinsing, and drying) 1  -KR  --  --    Toileting (which includes using toilet bed pan or urinal) 1  -KR  --  --    Putting on and taking off regular upper body clothing 1  -KR  --  --    Taking care of personal grooming (such as brushing teeth) 2  -KR  --  --    Eating meals 2  -KR  --  --    Score 8  -KR  --  --       Functional Assessment    Outcome Measure Options  -- AM-PAC 6 Clicks Daily Activity (OT)  -KR AM-PAC 6 Clicks Basic Mobility (PT)  -AG      User Key  (r) = Recorded By, (t) = Taken By, (c) = Cosigned By    Initials Name Provider Type    AG Nga Valera, PT Physical Therapist    YONIS Ignacio OT Occupational Therapist          Time Calculation:        Therapy Charges for Today     Code Description Service Date Service Provider Modifiers Qty    99310331218  OT SELFCARE CURRENT 4/10/2018 MARCO ANTONIO Villa CM 1    28644029740 HC OT SELFCARE PROJECTED 4/10/2018 MARCO ANTONIO Villa CK 1    [id number]  OT EVAL HIGH COMPLEXITY 2 4/10/2018 MARCO ANTONIO Villa 1          OT G-codes  OT Professional Judgement  Used?: Yes  Functional Limitation: Self care  Self Care Current Status (): At least 80 percent but less than 100 percent impaired, limited or restricted  Self Care Goal Status (): At least 40 percent but less than 60 percent impaired, limited or restricted    Viraj Ignacio OT  4/10/2018

## 2018-04-10 NOTE — PROGRESS NOTES
"HOSPITALIST PROGRESS NOTE    Patient Identification:  Name:  Debra Resendiz  Age:  72 y.o.  Sex:  female  :  1945  MRN:  [id number]  Visit Number:  [id number]  Primary Care Provider:  No Known Provider    Length of stay:  4     HPI: 71 yo female admitted with L shoulder and R knee pain after a fall    Procedures:  -Left proximal humerus ORIF with plate and screws    Subjective:  The patient was resting in bed this afternoon.  She complained of \"soreness.\"  She reports that is mostly in her left shoulder, right hip and right knee.  The patient was assisted to chair today with physical therapy.      The patient otherwise has no new complaints and specifically denies any nausea, vomiting, chest pain and/or shortness of breath.    Present during exam: TAMEKA Mclaughlin    Current Hospital Meds:    carbidopa-levodopa 1 tablet Oral Q8H   doxycycline 100 mg Oral Q12H   enoxaparin 40 mg Subcutaneous Daily   ferrous sulfate 325 mg Oral Daily With Breakfast   multivitamin 1 tablet Oral Daily     Vital Signs  Temp:  [98.2 °F (36.8 °C)-99.7 °F (37.6 °C)] 98.4 °F (36.9 °C)  Heart Rate:  [] 97  Resp:  [14-18] 18  BP: (112-141)/(44-62) 112/53  1    18  0328 18  0430 04/10/18  0323   Weight: 50.8 kg (112 lb) 54.4 kg (120 lb) 55.8 kg (123 lb)     Body mass index is 19.85 kg/m².    Physical exam:  Physical Exam   Constitutional: She is oriented to person, place, and time. She appears well-developed and well-nourished. No distress.   HENT:   Head: Normocephalic and atraumatic.   Mouth/Throat: Oropharynx is clear and moist.   Eyes: Conjunctivae and EOM are normal. Pupils are equal, round, and reactive to light.   Neck: Neck supple. No tracheal deviation present. No thyromegaly present.   Cardiovascular: Normal rate and regular rhythm.  Exam reveals no gallop and no friction rub.    No murmur heard.  Pulmonary/Chest: Breath sounds normal. No respiratory distress. She has no wheezes. She has no rales. "   Cardiovascular and pulmonary examination may be difficult due to left sided immobilizer that is in place   Abdominal: Soft. Bowel sounds are normal. She exhibits no distension. There is no tenderness. There is no guarding.   Musculoskeletal: She exhibits no edema.        Right knee: She exhibits decreased range of motion.   Right knee immobilizer in place   Neurological: She is alert and oriented to person, place, and time. No cranial nerve deficit.   Question of tremor in right lower extremity noted on my exam; ANABEL VARGHESE   Skin: Skin is warm and dry. No rash noted. No erythema.   Psychiatric: She has a normal mood and affect.      Results Review:    Results from last 7 days  Lab Units 04/10/18  0317 04/09/18  0215 04/08/18  1510 04/08/18  0318 04/07/18  0207 04/06/18  1920   WBC 10*3/mm3 7.78 9.67  --  10.06 11.72 16.10*   HEMOGLOBIN g/dL 8.9* 8.7* 10.0* 10.1* 9.6* 10.2*   HEMATOCRIT % 27.7* 27.4* 30.9* 31.1* 30.1* 31.1*   PLATELETS 10*3/mm3 345 323  --  306 330 353       Results from last 7 days  Lab Units 04/10/18  0317 04/09/18  0215 04/08/18  0318 04/07/18  0208 04/06/18  1920   SODIUM mmol/L 137 134* 135 136 137   POTASSIUM mmol/L 3.9 3.9 3.9 4.0 4.2   CHLORIDE mmol/L 103 102 103 108 107   CO2 mmol/L 31.2 26.6 24.7 19.9* 25.8   BUN mg/dL 15 15 16 18 17   CREATININE mg/dL 0.45 0.58 0.55 0.64 0.64   CALCIUM mg/dL 9.2 9.0 9.3 9.0 9.3   GLUCOSE mg/dL 94 94 87 113* 109       Results from last 7 days  Lab Units 04/07/18  0208 04/06/18  1920   BILIRUBIN mg/dL 0.4 0.4   ALK PHOS U/L 80 86   AST (SGOT) U/L 26 27   ALT (SGPT) U/L 19 19           Results from last 7 days  Lab Units 04/07/18  0536 04/06/18  1920   INR  1.14* 1.04       Results from last 7 days  Lab Units 04/08/18  1510 04/07/18  0926 04/07/18  0208 04/06/18  2120   CK TOTAL U/L  --  112 112 97   TROPONIN I ng/mL 0.068* 0.131* 0.158* 0.101*   CK MB INDEX %  --  2.2 2.7 3.2*   MYOGLOBIN ng/mL  --  58.0 96.0 102.0       Results from last 7 days  Lab Units  04/06/18  1920   BNP pg/mL 71.0         Assessment/Plan     -Closed left proximal humerus fracture status post ORIF, POD #2  -Acute blood loss anemia  -Right tibial plateau fracture status post fall with recommendations for conservative therapy with nonweightbearing bearing status ×6 weeks  -Indeterminate troponin with occasional palpitations; troponin level has improved  -Questionable atypical pneumonia patient on a course of doxycycline  -Mild tachycardia that appears to have improved  -And leg and ankle pain with negative Doppler ultrasound studies  -Questionable early parkinsonism     We will continue continue with doxycycline for now.  Continue with subcutaneous Lovenox.  Continue low-dose as needed analgesics for pain.  Continue to encourage physical and occupational therapy as tolerated. Awaiting to hear back from acute physical rehabilitation. I think that the patient would benefit greatly before returning home with her son.    A neurology consult has been placed.  Vitamin B12, folate and thyroid studies were within normal limits.    Continue to follow her hemoglobin and hematocrit closely and transfuse for hemoglobin less than 7.  I will repeat her CBC and BMP in the morning.    The patient is high risk due to the following diagnoses/reasons:  Left humerus fracture, right tibial plateau fracture, indeterminate troponin, question of parkinsonism    I discussed the patients findings and my recommendations with patient and nursing staff.    Disposition  Unclear; hopefully inpatient rehab prior to returning home      Анна Mai DO  04/10/18  3:26 PM

## 2018-04-10 NOTE — PLAN OF CARE
Problem: Pain, Acute (Adult)  Goal: Acceptable Pain Control/Comfort Level  Outcome: Ongoing (interventions implemented as appropriate)      Problem: Patient Care Overview  Goal: Plan of Care Review  Outcome: Ongoing (interventions implemented as appropriate)

## 2018-04-10 NOTE — ACP (ADVANCE CARE PLANNING)
The patient named her son, Diaz Resendiz, as her healthcare surrogate to act on her behalf.  She does not want any extended life support, should the need arise.  The Living Will was completed, signed (patient had difficulty signing), notarized, and charted.

## 2018-04-11 LAB
ANION GAP SERPL CALCULATED.3IONS-SCNC: 5.6 MMOL/L (ref 3.6–11.2)
BASOPHILS # BLD AUTO: 0.03 10*3/MM3 (ref 0–0.3)
BASOPHILS NFR BLD AUTO: 0.4 % (ref 0–2)
BUN BLD-MCNC: 19 MG/DL (ref 7–21)
BUN/CREAT SERPL: 41.3 (ref 7–25)
CALCIUM SPEC-SCNC: 9.2 MG/DL (ref 7.7–10)
CHLORIDE SERPL-SCNC: 102 MMOL/L (ref 99–112)
CO2 SERPL-SCNC: 30.4 MMOL/L (ref 24.3–31.9)
CREAT BLD-MCNC: 0.46 MG/DL (ref 0.43–1.29)
DEPRECATED RDW RBC AUTO: 44.9 FL (ref 37–54)
EOSINOPHIL # BLD AUTO: 0.17 10*3/MM3 (ref 0–0.7)
EOSINOPHIL NFR BLD AUTO: 2.1 % (ref 0–7)
ERYTHROCYTE [DISTWIDTH] IN BLOOD BY AUTOMATED COUNT: 13.5 % (ref 11.5–14.5)
GFR SERPL CREATININE-BSD FRML MDRD: 134 ML/MIN/1.73
GLUCOSE BLD-MCNC: 92 MG/DL (ref 70–110)
HCT VFR BLD AUTO: 27.6 % (ref 37–47)
HGB BLD-MCNC: 8.7 G/DL (ref 12–16)
IMM GRANULOCYTES # BLD: 0.02 10*3/MM3 (ref 0–0.03)
IMM GRANULOCYTES NFR BLD: 0.2 % (ref 0–0.5)
LYMPHOCYTES # BLD AUTO: 1.98 10*3/MM3 (ref 1–3)
LYMPHOCYTES NFR BLD AUTO: 24.5 % (ref 16–46)
MCH RBC QN AUTO: 30.9 PG (ref 27–33)
MCHC RBC AUTO-ENTMCNC: 31.5 G/DL (ref 33–37)
MCV RBC AUTO: 97.9 FL (ref 80–94)
MONOCYTES # BLD AUTO: 0.86 10*3/MM3 (ref 0.1–0.9)
MONOCYTES NFR BLD AUTO: 10.7 % (ref 0–12)
NEUTROPHILS # BLD AUTO: 5.01 10*3/MM3 (ref 1.4–6.5)
NEUTROPHILS NFR BLD AUTO: 62.1 % (ref 40–75)
OSMOLALITY SERPL CALC.SUM OF ELEC: 277.6 MOSM/KG (ref 273–305)
PLATELET # BLD AUTO: 404 10*3/MM3 (ref 130–400)
PMV BLD AUTO: 9.9 FL (ref 6–10)
POTASSIUM BLD-SCNC: 4 MMOL/L (ref 3.5–5.3)
RBC # BLD AUTO: 2.82 10*6/MM3 (ref 4.2–5.4)
SODIUM BLD-SCNC: 138 MMOL/L (ref 135–153)
WBC NRBC COR # BLD: 8.07 10*3/MM3 (ref 4.5–12.5)

## 2018-04-11 PROCEDURE — 85025 COMPLETE CBC W/AUTO DIFF WBC: CPT | Performed by: INTERNAL MEDICINE

## 2018-04-11 PROCEDURE — 99232 SBSQ HOSP IP/OBS MODERATE 35: CPT | Performed by: INTERNAL MEDICINE

## 2018-04-11 PROCEDURE — 25010000002 ONDANSETRON PER 1 MG: Performed by: HOSPITALIST

## 2018-04-11 PROCEDURE — 97110 THERAPEUTIC EXERCISES: CPT

## 2018-04-11 PROCEDURE — 25010000002 ENOXAPARIN PER 10 MG: Performed by: ORTHOPAEDIC SURGERY

## 2018-04-11 PROCEDURE — 97530 THERAPEUTIC ACTIVITIES: CPT

## 2018-04-11 PROCEDURE — 80048 BASIC METABOLIC PNL TOTAL CA: CPT | Performed by: INTERNAL MEDICINE

## 2018-04-11 RX ORDER — DIPHENHYDRAMINE, LIDOCAINE, NYSTATIN
5 KIT ORAL 4 TIMES DAILY
Status: DISCONTINUED | OUTPATIENT
Start: 2018-04-11 | End: 2018-04-13 | Stop reason: HOSPADM

## 2018-04-11 RX ORDER — L.ACID,PARA/B.BIFIDUM/S.THERM 8B CELL
1 CAPSULE ORAL DAILY
Status: DISCONTINUED | OUTPATIENT
Start: 2018-04-11 | End: 2018-04-13 | Stop reason: HOSPADM

## 2018-04-11 RX ORDER — DOCUSATE SODIUM 100 MG/1
100 CAPSULE, LIQUID FILLED ORAL 2 TIMES DAILY
Status: DISCONTINUED | OUTPATIENT
Start: 2018-04-11 | End: 2018-04-12

## 2018-04-11 RX ADMIN — Medication 5 ML: at 17:26

## 2018-04-11 RX ADMIN — HYDROCODONE BITARTRATE AND ACETAMINOPHEN 1 TABLET: 5; 325 TABLET ORAL at 12:18

## 2018-04-11 RX ADMIN — CARBIDOPA AND LEVODOPA 1 TABLET: 25; 100 TABLET ORAL at 05:52

## 2018-04-11 RX ADMIN — DOXYCYCLINE 100 MG: 100 CAPSULE ORAL at 20:00

## 2018-04-11 RX ADMIN — ENOXAPARIN SODIUM 40 MG: 40 INJECTION SUBCUTANEOUS at 08:53

## 2018-04-11 RX ADMIN — HYDROCODONE BITARTRATE AND ACETAMINOPHEN 1 TABLET: 5; 325 TABLET ORAL at 20:00

## 2018-04-11 RX ADMIN — CARBIDOPA AND LEVODOPA 1 TABLET: 25; 100 TABLET ORAL at 13:11

## 2018-04-11 RX ADMIN — ONDANSETRON 4 MG: 2 INJECTION INTRAMUSCULAR; INTRAVENOUS at 13:11

## 2018-04-11 RX ADMIN — DOXYCYCLINE 100 MG: 100 CAPSULE ORAL at 08:52

## 2018-04-11 RX ADMIN — FERROUS SULFATE TAB 325 MG (65 MG ELEMENTAL FE) 325 MG: 325 (65 FE) TAB at 08:53

## 2018-04-11 RX ADMIN — HYDROCODONE BITARTRATE AND ACETAMINOPHEN 1 TABLET: 5; 325 TABLET ORAL at 05:56

## 2018-04-11 RX ADMIN — Medication 1 CAPSULE: at 17:26

## 2018-04-11 RX ADMIN — CARBIDOPA AND LEVODOPA 1 TABLET: 25; 100 TABLET ORAL at 20:00

## 2018-04-11 RX ADMIN — Medication 1 TABLET: at 08:52

## 2018-04-11 RX ADMIN — HYDROCODONE BITARTRATE AND ACETAMINOPHEN 1 TABLET: 5; 325 TABLET ORAL at 15:40

## 2018-04-11 RX ADMIN — Medication 5 ML: at 20:00

## 2018-04-11 RX ADMIN — DOCUSATE SODIUM 100 MG: 100 CAPSULE, LIQUID FILLED ORAL at 12:18

## 2018-04-11 RX ADMIN — DOCUSATE SODIUM 100 MG: 100 CAPSULE, LIQUID FILLED ORAL at 20:00

## 2018-04-11 NOTE — PLAN OF CARE
Problem: Fall Risk (Adult)  Goal: Absence of Fall  Outcome: Ongoing (interventions implemented as appropriate)      Problem: Patient Care Overview  Goal: Plan of Care Review  Outcome: Ongoing (interventions implemented as appropriate)      Problem: Skin Injury Risk (Adult)  Goal: Skin Health and Integrity  Outcome: Ongoing (interventions implemented as appropriate)

## 2018-04-11 NOTE — PROGRESS NOTES
HOSPITALIST PROGRESS NOTE    Patient Identification:  Name:  Debra Resendiz  Age:  72 y.o.  Sex:  female  :  1945  MRN:  [id number]  Visit Number:  [id number]  Primary Care Provider:  No Known Provider    Length of stay:  5     HPI: 73 yo female admitted with L shoulder and R knee pain after a fall    Procedures:  -Left proximal humerus ORIF with plate and screws    Subjective:  The patient was seen late this morning and was sitting in her bedside recliner. She was assisted by PT and also underwent therapy on her left shoulder.    The patient continues to complain of shoulder and knee pain. She also reports constipation. She denies any dyspnea, chest pain, nausea and/or vomiting. She reported a poor appetite this am.    Present during exam: TAMEKA Bone    Current Hospital Meds:    carbidopa-levodopa 1 tablet Oral Q8H   docusate sodium 100 mg Oral BID   doxycycline 100 mg Oral Q12H   enoxaparin 40 mg Subcutaneous Daily   ferrous sulfate 325 mg Oral Daily With Breakfast   lactobacillus acidophilus 1 capsule Oral Daily   multivitamin 1 tablet Oral Daily   nystatin susp + lidocaine viscous 5 mL Swish & Swallow 4x Daily     Vital Signs  Temp:  [98.1 °F (36.7 °C)-98.4 °F (36.9 °C)] 98.1 °F (36.7 °C)  Heart Rate:  [] 104  Resp:  [14-16] 16  BP: (113-137)/(44-62) 137/56  1    18  0430 04/10/18  0323 18  0324   Weight: 54.4 kg (120 lb) 55.8 kg (123 lb) 53.5 kg (118 lb)     Body mass index is 19.05 kg/m².    Physical exam:  Physical Exam   Constitutional: She is oriented to person, place, and time. She appears well-developed and well-nourished. No distress.   HENT:   Head: Normocephalic and atraumatic.   Mouth/Throat: Oropharynx is clear and moist.   Eyes: Conjunctivae and EOM are normal. Pupils are equal, round, and reactive to light.   Neck: Neck supple. No tracheal deviation present. No thyromegaly present.   Cardiovascular: Normal rate and regular rhythm.  Exam reveals no gallop and no friction  rub.    No murmur heard.  Pulmonary/Chest: Breath sounds normal. No respiratory distress. She has no wheezes. She has no rales.   Cardiovascular and pulmonary examination may be difficult due to left sided immobilizer that is in place   Abdominal: Soft. Bowel sounds are normal. She exhibits no distension. There is no tenderness. There is no guarding.   Musculoskeletal: She exhibits no edema.        Right knee: She exhibits decreased range of motion.   Right knee immobilizer in place   Neurological: She is alert and oriented to person, place, and time. No cranial nerve deficit.   Question of tremor in right lower extremity noted on my exam; ANABEL VARGHESE   Skin: Skin is warm and dry. No rash noted. No erythema.   Psychiatric: She has a normal mood and affect.      Results Review:    Results from last 7 days  Lab Units 04/11/18  0115 04/10/18  0317 04/09/18  0215 04/08/18  1510 04/08/18  0318 04/07/18  0207 04/06/18  1920   WBC 10*3/mm3 8.07 7.78 9.67  --  10.06 11.72 16.10*   HEMOGLOBIN g/dL 8.7* 8.9* 8.7* 10.0* 10.1* 9.6* 10.2*   HEMATOCRIT % 27.6* 27.7* 27.4* 30.9* 31.1* 30.1* 31.1*   PLATELETS 10*3/mm3 404* 345 323  --  306 330 353       Results from last 7 days  Lab Units 04/11/18  0115 04/10/18  0317 04/09/18  0215 04/08/18  0318 04/07/18  0208 04/06/18  1920   SODIUM mmol/L 138 137 134* 135 136 137   POTASSIUM mmol/L 4.0 3.9 3.9 3.9 4.0 4.2   CHLORIDE mmol/L 102 103 102 103 108 107   CO2 mmol/L 30.4 31.2 26.6 24.7 19.9* 25.8   BUN mg/dL 19 15 15 16 18 17   CREATININE mg/dL 0.46 0.45 0.58 0.55 0.64 0.64   CALCIUM mg/dL 9.2 9.2 9.0 9.3 9.0 9.3   GLUCOSE mg/dL 92 94 94 87 113* 109       Results from last 7 days  Lab Units 04/07/18  0208 04/06/18  1920   BILIRUBIN mg/dL 0.4 0.4   ALK PHOS U/L 80 86   AST (SGOT) U/L 26 27   ALT (SGPT) U/L 19 19           Results from last 7 days  Lab Units 04/07/18  0536 04/06/18  1920   INR  1.14* 1.04       Results from last 7 days  Lab Units 04/08/18  1510 04/07/18  0926 04/07/18  0208  04/06/18  2120   CK TOTAL U/L  --  112 112 97   TROPONIN I ng/mL 0.068* 0.131* 0.158* 0.101*   CK MB INDEX %  --  2.2 2.7 3.2*   MYOGLOBIN ng/mL  --  58.0 96.0 102.0       Results from last 7 days  Lab Units 04/06/18  1920   BNP pg/mL 71.0         Assessment/Plan     -Closed left proximal humerus fracture status post ORIF, POD #2  -Acute blood loss anemia  -Mild thrombocytosis  -Right tibial plateau fracture status post fall with recommendations for conservative therapy with nonweightbearing bearing status ×6 weeks  -Indeterminate troponin with occasional palpitations; troponin level has improved  -Questionable atypical pneumonia patient on a course of doxycycline  -Mild tachycardia that appears to have improved  -And leg and ankle pain with negative Doppler ultrasound studies  -Questionable early parkinsonism     We will continue continue with doxycycline for now.  Continue with subcutaneous Lovenox.  Continue low-dose as needed analgesics for pain.  Continue to encourage physical and occupational therapy as tolerated. Inpatient physical rehabilitation has refused the patient. ROBERT attempting skilled nursing placement.     Neurology has started the patient on Sinemet.    Begin Colace and PRN Miralax.    Continue to follow her hemoglobin and hematocrit closely and transfuse for hemoglobin less than 7.  I will repeat her CBC and BMP in the morning.    The patient is high risk due to the following diagnoses/reasons:  Left humerus fracture, right tibial plateau fracture, indeterminate troponin, question of parkinsonism    I discussed the patients findings and my recommendations with patient, SW and nursing staff.    Disposition  Unclear; inpatient rehab has refused the patient. ROBERT attempting SNF.      Анна Mai DO  04/11/18  6:49 PM

## 2018-04-11 NOTE — THERAPY TREATMENT NOTE
Acute Care - Occupational Therapy Treatment Note   [place]     Patient Name: Debra Resendiz  : 1945  MRN: [id number]  Today's Date: 2018  Onset of Illness/Injury or Date of Surgery: 18     Referring Physician: Dr. Aburto    Admit Date: 2018       ICD-10-CM ICD-9-CM   1. Closed displaced comminuted fracture of shaft of left humerus, initial encounter S42.352A 812.21   2. Right medial tibial plateau fracture, closed, initial encounter S82.131A 823.00     Patient Active Problem List   Diagnosis   • Closed displaced comminuted fracture of shaft of left humerus     History reviewed. No pertinent past medical history.  Past Surgical History:   Procedure Laterality Date   • HIP SURGERY         Therapy Treatment          Rehabilitation Treatment Summary     Row Name 18 1508             Treatment Time/Intention    Discipline occupational therapist  -KR      Document Type therapy note (daily note)  -KR      Patient Effort good  -KR      Recorded by [KR] Viraj Ignacio OT 18 1513 18 1513      Row Name 18 1508             Therapeutic Exercise    Therapeutic Exercise seated, upper extremities  -KR      Recorded by [KR] Viraj Ignacio OT 18 1513 18 1513      Row Name 18 1508             Upper Extremity Seated Therapeutic Exercise    Exercise Type, Seated Upper Extremity (Therapeutic Exercise) PROM (passive range of motion)  -KR      Comment, Seated Upper Extremity (Therapeutic Exercise) PROM L elbow, wrist, hand. tolerated activity adequately. OT to continue POC. Removed slling during tx session and re-applied following session.   -KR      Recorded by [KR] Viraj Ignacio OT 18 1513 18 1513      Row Name                Wound 18 1047 Left arm incision    Wound - Properties Group Date first assessed: 18 [LT] Time first assessed:  [LT] Side: Left [LT] Location: arm [LT] Type: incision [LT] Recorded by:  [LT] Trisha Allan RN 18 1047  04/08/18 1047      User Key  (r) = Recorded By, (t) = Taken By, (c) = Cosigned By    Initials Name Effective Dates Discipline    YONIS Ignacio OT 04/03/18 -  OT    LT Trisha Allan RN 06/16/16 -  Nurse        Wound 04/08/18 1047 Left arm incision (Active)   Dressing Appearance dry;intact 4/11/2018  8:50 AM   Closure Staples 4/10/2018  6:00 PM   Base pink;clean 4/11/2018  5:52 AM   Periwound pink;dry 4/11/2018  5:52 AM   Periwound Temperature cool 4/10/2018  6:00 PM   Drainage Characteristics/Odor serosanguineous;bleeding controlled 4/10/2018  6:00 PM   Drainage Amount small 4/10/2018  6:00 PM   Dressing Care, Wound dressing changed 4/11/2018  5:52 AM             OT Recommendation and Plan  Planned Therapy Interventions (OT Eval): ROM/therapeutic exercise, strengthening exercise, adaptive equipment training           Outcome Measures     Row Name 04/10/18 1523 04/10/18 1500 04/09/18 1800       How much help from another person do you currently need...    Turning from your back to your side while in flat bed without using bedrails?  --  -- 2  -AG    Moving from lying on back to sitting on the side of a flat bed without bedrails?  --  -- 2  -AG    Moving to and from a bed to a chair (including a wheelchair)?  --  -- 2  -AG    Standing up from a chair using your arms (e.g., wheelchair, bedside chair)?  --  -- 1  -AG    Climbing 3-5 steps with a railing?  --  -- 1  -AG    To walk in hospital room?  --  -- 1  -AG    AM-PAC 6 Clicks Score  --  -- 9  -AG       How much help from another is currently needed...    Putting on and taking off regular lower body clothing? 1  -KR  --  --    Bathing (including washing, rinsing, and drying) 1  -KR  --  --    Toileting (which includes using toilet bed pan or urinal) 1  -KR  --  --    Putting on and taking off regular upper body clothing 1  -KR  --  --    Taking care of personal grooming (such as brushing teeth) 2  -KR  --  --    Eating meals 2  -KR  --  --    Score 8  -KR   --  --       Functional Assessment    Outcome Measure Options  -- AM-PAC 6 Clicks Daily Activity (OT)  -KR AM-PAC 6 Clicks Basic Mobility (PT)  -AG      User Key  (r) = Recorded By, (t) = Taken By, (c) = Cosigned By    Initials Name Provider Type    AG Nga Valera, PT Physical Therapist    YONIS Ignacio OT Occupational Therapist           Time Calculation:         Time Calculation- OT     Row Name 04/11/18 1513             Time Calculation- OT    Total Timed Code Minutes- OT 30 minute(s)  -KR        User Key  (r) = Recorded By, (t) = Taken By, (c) = Cosigned By    Initials Name Provider Type    YONIS Ignacio OT Occupational Therapist           Therapy Charges for Today     Code Description Service Date Service Provider Modifiers Qty    [id number]  OT SELFCARE CURRENT 4/10/2018 MARCO ANTONIO Villa, CM 1    [id number]  OT SELFCARE PROJECTED 4/10/2018 MARCO ANTONIO Villa, CK 1    [id number]  OT EVAL HIGH COMPLEXITY 2 4/10/2018 Viraj Ignacio OT GO 1    [id number]  OT THERAPEUTIC ACT EA 15 MIN 4/11/2018 Viraj Ignacio OT GO 2          OT G-codes  OT Professional Judgement Used?: Yes  Functional Limitation: Self care  Self Care Current Status (): At least 80 percent but less than 100 percent impaired, limited or restricted  Self Care Goal Status (): At least 40 percent but less than 60 percent impaired, limited or restricted    Viraj Ignacio OT  4/11/2018

## 2018-04-11 NOTE — PROGRESS NOTES
Discharge Planning Assessment   [place]     Patient Name: Debra Resendiz  MRN: [id number]  Today's Date: 4/11/2018    Admit Date: 4/6/2018          Discharge Needs Assessment    No documentation.           Discharge Plan     Row Name 04/11/18 1428       Plan    Plan SS spoke with [facility] per Lidia who stated that pt was not a candidate for [facility] at this time.  SS will follow and assist with discharge plans.         Destination     No service coordination in this encounter.      Durable Medical Equipment     No service coordination in this encounter.      Dialysis/Infusion     No service coordination in this encounter.      Home Medical Care     No service coordination in this encounter.      Social Care     No service coordination in this encounter.        Expected Discharge Date and Time     Expected Discharge Date Expected Discharge Time    Apr 12, 2018               Demographic Summary    No documentation.           Functional Status    No documentation.           Psychosocial    No documentation.           Abuse/Neglect    No documentation.           Legal    No documentation.           Substance Abuse    No documentation.           Patient Forms    No documentation.         Meg Fang

## 2018-04-11 NOTE — THERAPY TREATMENT NOTE
Acute Care - Physical Therapy Treatment Note   [place]     Patient Name: Debra Resendiz  : 1945  MRN: [id number]  Today's Date: 2018  Onset of Illness/Injury or Date of Surgery: 18  Date of Referral to PT: 18  Referring Physician: Dr. Aburto    Admit Date: 2018    Visit Dx:    ICD-10-CM ICD-9-CM   1. Closed displaced comminuted fracture of shaft of left humerus, initial encounter S42.352A 812.21   2. Right medial tibial plateau fracture, closed, initial encounter S82.131A 823.00     Patient Active Problem List   Diagnosis   • Closed displaced comminuted fracture of shaft of left humerus       Therapy Treatment          Rehabilitation Treatment Summary     Row Name 18 1648 18 1508          Treatment Time/Intention    Discipline  -- occupational therapist  -KR     Document Type therapy note (daily note)  -RF therapy note (daily note)  -KR     Mode of Treatment individual therapy;physical therapy   treated BID  -RF  --     Therapy Frequency (PT Clinical Impression) 5 times/wk   1-2 times/ day per priority policy  -RF  --     Patient Effort adequate  -RF good  -KR     Existing Precautions/Restrictions brace worn when out of bed;fall;non-weight bearing;oxygen therapy device and L/min;weight bearing;other (see comments)   NWB L UE and R LE; L UE sling, R KI   -RF  --     Patient Response to Treatment Patient demonstrated improved wbing on LLE with bed>chair transfer this AM. C/o pain noted while sitting in chair so patient requested to return to bed shortly. Education provided on importance of OOB daily and complaince of HEP for improved strengthening. Patient refused OOB in PM. HEp reviewed and pt provided education on importance of complaince.   -RF  --     Recorded by [RF] Marta Mckeon, PTA 18 1652 18 1652 [KR] Viraj Ignacio OT 18 1513 18 1513     Row Name 18 1648             Cognitive Assessment/Intervention- PT/OT    Orientation Status  (Cognition) oriented x 3  -RF      Follows Commands (Cognition) follows one step commands;delayed response/completion;increased processing time needed;initiation impaired;physical/tactile prompts required  -RF      Recorded by [RF] Marta Mckeon, PTA 04/11/18 1652 04/11/18 1652      Row Name 04/11/18 1648             Mobility Assessment/Intervention    Extremity Weight-bearing Status left upper extremity;right upper extremity;left lower extremity;right lower extremity  -RF      Left Upper Extremity (Weight-bearing Status) non weight-bearing (NWB)  -RF      Right Upper Extremity (Weight-bearing Status) full weight-bearing (FWB)  -RF      Left Lower Extremity (Weight-bearing Status) weight-bearing as tolerated (WBAT)  -RF      Right Lower Extremity (Weight-bearing Status) non weight-bearing (NWB)  -RF      Recorded by [RF] Marta Mckeon, PTA 04/11/18 1652 04/11/18 1652      Row Name 04/11/18 1642             Bed Mobility Assessment/Treatment    Bed Mobility Assessment/Treatment rolling right;scooting/bridging;supine-sit;sit-supine  -RF      Rolling Right [place] (Bed Mobility) verbal cues;nonverbal cues (demo/gesture);maximum assist (25% patient effort);2 person assist  -RF      Scooting/Bridging [place] (Bed Mobility) verbal cues;nonverbal cues (demo/gesture);maximum assist (25% patient effort);2 person assist  -RF      Supine-Sit [place] (Bed Mobility) verbal cues;nonverbal cues (demo/gesture);maximum assist (25% patient effort);2 person assist  -RF      Sit-Supine [place] (Bed Mobility) verbal cues;nonverbal cues (demo/gesture);maximum assist (25% patient effort);2 person assist  -RF      Bed Mobility, Safety Issues cognitive deficits limit understanding;decreased use of arms for pushing/pulling;decreased use of legs for bridging/pushing;impaired trunk control for bed mobility  -RF      Recorded by [RF] Marta Mckeon, PTA 04/11/18 1652 04/11/18 1652      Row Name 04/11/18 1644              Transfer Assessment/Treatment    Transfer Assessment/Treatment other (see comments);bed-chair transfer;chair-bed transfer  -RF      Bed-Chair [place] (Transfers) maximum assist (25% patient effort);2 person assist;nonverbal cues (demo/gesture);verbal cues  -RF      Chair-Bed [place] (Transfers) maximum assist (25% patient effort);2 person assist;nonverbal cues (demo/gesture);verbal cues  -RF      Assistive Device (Bed-Chair Transfers) --   stand-pivot  -RF      Recorded by [RF] Marta Mckeon, PTA 04/11/18 1652 04/11/18 1652      Row Name 04/11/18 1648             Chair-Bed Transfer    Assistive Device (Chair-Bed Transfers) --   stand-pivot  -RF      Recorded by [RF] Marta Mckeon, PTA 04/11/18 1652 04/11/18 1652      Row Name 04/11/18 1648             Gait/Stairs Assessment/Training    [place] Level (Gait) unable to assess  -RF      Recorded by [RF] Marta Mckeon, PTA 04/11/18 1652 04/11/18 1652      Row Name 04/11/18 1648             Motor Skills Assessment/Interventions    Additional Documentation Therapeutic Exercise (Group)  -RF      Recorded by [RF] Marta Mckeon, PTA 04/11/18 1652 04/11/18 1652      Row Name 04/11/18 1508             Therapeutic Exercise    Therapeutic Exercise seated, upper extremities  -KR      Recorded by [KR] Viraj Ignacio OT 04/11/18 1513 04/11/18 1513      Row Name 04/11/18 1508             Upper Extremity Seated Therapeutic Exercise    Exercise Type, Seated Upper Extremity (Therapeutic Exercise) PROM (passive range of motion)  -KR      Comment, Seated Upper Extremity (Therapeutic Exercise) PROM L elbow, wrist, hand. tolerated activity adequately. OT to continue POC. Removed slling during tx session and re-applied following session.   -KR      Recorded by [KR] Viraj Ignacio OT 04/11/18 1513 04/11/18 1513      Row Name 04/11/18 1648             Lower Extremity Supine Therapeutic Exercise    Performed, Supine Lower Extremity (Therapeutic Exercise) --   AP,  ankle circles, QS, SLR, GS  -RF      Exercise Type, Supine Lower Extremity (Therapeutic Exercise) AROM (active range of motion);isotonic contraction, concentric  -RF      Expected Outcomes, Supine Lower Extremity (Therapeutic Exercise) improve functional tolerance, self-care activity;improve performance, gait skills;strengthen normal movement patterns;strengthen, facilitate independent active range of motion  -RF      Sets/Reps Detail, Supine Lower Extremity (Therapeutic Exercise) 1X10  -RF      Recorded by [RF] Marta Mckeon, [facility] 04/11/18 1652 04/11/18 1652      Row Name 04/11/18 1648             Positioning and Restraints    Pre-Treatment Position in bed   BID  -RF      In Bed supine;call light within reach;encouraged to call for assist;exit alarm on;with family/caregiver   PM  -RF      In Chair reclined;call light within reach;encouraged to call for assist;exit alarm on   Am  -RF      Recorded by [RF] Marta Mckeon, PTA 04/11/18 1652 04/11/18 1652      Row Name 04/11/18 1648             Pain Scale: Numbers Pre/Post-Treatment    Pain Location - Side Bilateral  -RF      Pain Location - Orientation other (see comments)   L UE> R LE pain  -RF      Recorded by [RF] Marta Mckeon, PTA 04/11/18 1652 04/11/18 1652      Row Name 04/11/18 1648             Pain Scale: FACES Pre/Post-Treatment    Pain: FACES Scale, Pretreatment 4-->hurts little more  -RF      Pain: FACES Scale, Post-Treatment 6-->hurts even more  -RF      Recorded by [RF] Marta Mckeon, PTA 04/11/18 1652 04/11/18 1652      Row Name 04/11/18 1648             Sensory Assessment/Intervention    Sensory General Assessment other (see comments)  -RF      Recorded by [RF] Marta Mckeon, PTA 04/11/18 1652 04/11/18 1652      Row Name 04/11/18 1648             Vision Assessment/Intervention    Visual Impairment/Limitations WFL  -RF      Recorded by [RF] Marta Mckeon, PTA 04/11/18 1652 04/11/18 1652      Row Name                Wound 04/08/18 1047  Left arm incision    Wound - Properties Group Date first assessed: 04/08/18 [LT] Time first assessed: 1047 [LT] Side: Left [LT] Location: arm [LT] Type: incision [LT] Recorded by:  [LT] Trisha Allan RN 04/08/18 1047 04/08/18 1047    Row Name 04/11/18 1648             Coping    Observed Emotional State accepting  -RF      Recorded by [RF] Marta Mckeon, PTA 04/11/18 1652 04/11/18 1652      Row Name 04/11/18 1648             Outcome Summary/Treatment Plan (PT)    Anticipated Equipment Needs at Discharge (PT) wheelchair  -RF      Anticipated Discharge Disposition (PT) inpatient rehab facility;skilled nursing facility (SNF);still a patient  -RF      Recorded by [RF] Marta Mckeon, [facility] 04/11/18 1652 04/11/18 1652        User Key  (r) = Recorded By, (t) = Taken By, (c) = Cosigned By    Initials Name Effective Dates Discipline    KR Viraj Ignacio OT 04/03/18 -  OT    LT Trisha Allan RN 06/16/16 -  Nurse    RF Marta Mckeon, PTA 03/07/18 -  PT          Wound 04/08/18 1047 Left arm incision (Active)   Dressing Appearance dry;intact 4/11/2018  8:50 AM   Closure [place] 4/10/2018  6:00 PM   Base pink;clean 4/11/2018  5:52 AM   Periwound pink;dry 4/11/2018  5:52 AM   Periwound Temperature cool 4/10/2018  6:00 PM   Drainage Characteristics/Odor serosanguineous;bleeding controlled 4/10/2018  6:00 PM   Drainage Amount small 4/10/2018  6:00 PM   Dressing Care, Wound dressing changed 4/11/2018  5:52 AM             Physical Therapy Education     Title: PT OT SLP Therapies (Done)     Topic: Physical Therapy (Done)     Point: Mobility training (Done)    Learning Progress Summary     Learner Status Readiness Method Response Comment Documented by    Patient Done Acceptance E VU  RF 04/11/18 1652     Done Acceptance E VU  AM 04/11/18 1611     Done Acceptance E VU  RF 04/10/18 1543     Done Acceptance EINGRID NR  AG 04/09/18 1841    Family Done Acceptance E,INGRID KURTZNR  AG 04/09/18 1841          Point: Home exercise  program (Done)    Learning Progress Summary     Learner Status Readiness Method Response Comment Documented by    Patient Done Acceptance E VU  RF 04/11/18 1652     Done Acceptance E VU  AM 04/11/18 1611     Done Acceptance E VU  RF 04/10/18 1543     Done Acceptance E,D VU,NR  AG 04/09/18 1841    Family Done Acceptance E,D VU,NR  AG 04/09/18 1841          Point: Body mechanics (Done)    Learning Progress Summary     Learner Status Readiness Method Response Comment Documented by    Patient Done Acceptance E VU  RF 04/11/18 1652     Done Acceptance E VU  AM 04/11/18 1611     Done Acceptance E VU  RF 04/10/18 1543     Done Acceptance E,D VU,NR  AG 04/09/18 1841    Family Done Acceptance E,D VU,NR  AG 04/09/18 1841          Point: Precautions (Done)    Learning Progress Summary     Learner Status Readiness Method Response Comment Documented by    Patient Done Acceptance E VU  RF 04/11/18 1652     Done Acceptance E VU  AM 04/11/18 1611     Done Acceptance E VU  RF 04/10/18 1543     Done Acceptance E,D VU,NR  AG 04/09/18 1841    Family Done Acceptance E,D VU,NR  AG 04/09/18 1841                      User Key     Initials Effective Dates Name Provider Type Discipline     06/16/16 -  Smitha Glass, RN Registered Nurse Nurse     04/03/18 -  Nga Valera, PT Physical Therapist PT     03/07/18 -  Marta Mckeon, PTA Physical Therapy Assistant PT                    PT Recommendation and Plan  Anticipated Discharge Disposition (PT): inpatient rehab facility, skilled nursing facility (SNF), still a patient  Therapy Frequency (PT Clinical Impression): 5 times/wk (1-2 times/ day per priority policy)  Outcome Summary/Treatment Plan (PT)  Anticipated Equipment Needs at Discharge (PT): wheelchair  Anticipated Discharge Disposition (PT): inpatient rehab facility, skilled nursing facility (SNF), still a patient  Plan of Care Reviewed With: patient, son  Progress: improving  Outcome Summary: Patient continues to require  skilled care and education per POc to tolerance for further improved LE strength and functional mobility.           Outcome Measures     Row Name 04/10/18 1523 04/10/18 1500 04/09/18 1800       How much help from another person do you currently need...    Turning from your back to your side while in flat bed without using bedrails?  --  -- 2  -AG    Moving from lying on back to sitting on the side of a flat bed without bedrails?  --  -- 2  -AG    Moving to and from a bed to a chair (including a wheelchair)?  --  -- 2  -AG    Standing up from a chair using your arms (e.g., wheelchair, bedside chair)?  --  -- 1  -AG    Climbing 3-5 steps with a railing?  --  -- 1  -AG    To walk in hospital room?  --  -- 1  -AG    AM-PAC 6 Clicks Score  --  -- 9  -AG       How much help from another is currently needed...    Putting on and taking off regular lower body clothing? 1  -KR  --  --    Bathing (including washing, rinsing, and drying) 1  -KR  --  --    Toileting (which includes using toilet bed pan or urinal) 1  -KR  --  --    Putting on and taking off regular upper body clothing 1  -KR  --  --    Taking care of personal grooming (such as brushing teeth) 2  -KR  --  --    Eating meals 2  -KR  --  --    Score 8  -KR  --  --       Functional Assessment    Outcome Measure Options  -- AM-PAC 6 Clicks Daily Activity (OT)  -KR AM-PAC 6 Clicks Basic Mobility (PT)  -AG      User Key  (r) = Recorded By, (t) = Taken By, (c) = Cosigned By    Initials Name Provider Type    AG Nga Valera, PT Physical Therapist    YONIS Ignacio, OT Occupational Therapist           Time Calculation:         PT Charges     Row Name 04/11/18 1654 04/11/18 1653          Time Calculation    PT Received On 04/11/18  -RF 04/11/18  -RF     PT - Next Appointment 04/12/18  -RF 04/11/18  -RF     PT Goal Re-Cert Due Date 04/23/18  -RF 04/23/18  -RF        Time Calculation- PT    TCU Minutes- PT 26 min  -RF 24 min  -RF       User Key  (r) = Recorded By, (t) =  Taken By, (c) = Cosigned By    Initials Name Provider Type    RF Marta Mckeon, [facility] Physical Therapy Assistant          Therapy Charges for Today     Code Description Service Date Service Provider Modifiers Qty    80520524800 HC PT THERAPEUTIC ACT EA 15 MIN 4/10/2018 Marta Mckeon, PTA GP 4    53288366426 HC PT THER SUPP EA 15 MIN 4/10/2018 Marta Mckeon, PTA GP 4    75068813009 HC PT THERAPEUTIC ACT EA 15 MIN 4/11/2018 Marta Mckeon, PTA GP 3    58536326986 HC PT THER PROC EA 15 MIN 4/11/2018 Marta Mckeon, PTA GP 1    71332790932 HC PT THER SUPP EA 15 MIN 4/11/2018 Marta Mckeon, PTA GP 4          PT G-Codes  Outcome Measure Options: AM-PAC 6 Clicks Daily Activity (OT)  Score: 9  Functional Limitation: Mobility: Walking and moving around  Mobility: Walking and Moving Around Current Status (): At least 60 percent but less than 80 percent impaired, limited or restricted  Mobility: Walking and Moving Around Goal Status (): At least 20 percent but less than 40 percent impaired, limited or restricted    Marta Mckeon PTA  4/11/2018

## 2018-04-11 NOTE — DISCHARGE PLACEMENT REQUEST
"Debra Resendiz (72 y.o. Female)     Date of Birth Social Security Number Address Home Phone MRN    1945  [address]  [facility] [address] [phone] [id number]    Spiritism Marital Status          None        Admission Date Admission Type Admitting Provider Attending Provider Department, Room/Bed    18 Emergency Cornel Andrade MD Grace, Aimee Russell, DO [address], 3320/1P    Discharge Date Discharge Disposition Discharge Destination                       Attending Provider:  Анна Mai DO    Allergies:  Penicillins    Isolation:  None   Infection:  None   Code Status:  FULL    Ht:  167.6 cm (66\")   Wt:  53.5 kg (118 lb)    Admission Cmt:  None   Principal Problem:  None                Active Insurance as of 2018     Primary Coverage     Payor Plan Insurance Group Employer/Plan Group    MEDICARE MEDICARE A & B      Payor Plan Address Payor Plan Phone Number Effective From Effective To    [address] [phone] 2011     [place] [address]       Subscriber Name Subscriber Birth Date Member ID       DEBRA RESENDIZ 1945 [id number]                 Emergency Contacts      (Rel.) Home Phone Work Phone Mobile Phone    Diaz Resendiz (Son) [phone] -- --            Emergency Contact Information     Name Relation Home Work Mobile    Diaz Resendiz Son [phone]            Insurance Information                MEDICARE/MEDICARE A & B Phone: [phone]    Subscriber: Debra Resendiz Subscriber#: [id number]    Group#:  Precert#:           Problem List           Codes Noted - Resolved       Hospital    Closed displaced comminuted fracture of shaft of left humerus ICD-10-CM: S42.352A  ICD-9-CM: 812.21 2018 - Present             History & Physical      Cornel Andrade MD at 2018 10:43 PM          Hospitalist History and Physical        Patient Identification  Name: Debra Resendiz  Age/Sex: 72 y.o. female  :  1945      "   MRN: [id number]  Visit Number: [id number]  PCP: No Known Provider        Chief complaint left shoulder, right knee pain after falling    History of Present Illness:  Patient is a 72 y.o. female who presents with pain in her left shoulder and right knee after falling at home. She states she was walking down a steep embankment outside her house when her feet slipped out from under her and she toppled down several feet and landed on gravel. She had immediate pain in her left shoulder and right knee. She was unable to get up but fortunately a relative who was nearby came to check on her and found her on the ground after a few minutes. She was brought to our ED and found to have a left humeral neck fracture and a right medial tibial plateau fracture. Her basic labs showed leukocytosis which is felt to be reactive from the fall and fractures, as she has no signs/symptoms of infection at this time. Initial troponin was indeterminate and repeat continues to rise, without a corresponding elevated CPK to explain possible spillover. Patient denies any chest pain or shortness of breath. EKG does not show any evidence of acute ischemia. She denies history of coronary artery disease. She does state that coronary artery disease runs in her family, however. She has been admitted to the telemetry floor for further management of above-mentioned fractures as well as workup for indeterminate troponin elevation.     Review of Systems  Review of Systems   Constitutional: Negative for activity change, appetite change, chills, diaphoresis, fatigue, fever and unexpected weight change.   HENT: Negative for congestion, postnasal drip, rhinorrhea, sinus pressure and sore throat.    Eyes: Negative for photophobia, pain, discharge, redness, itching and visual disturbance.   Respiratory: Negative for cough, shortness of breath and wheezing.    Cardiovascular: Negative for chest pain, palpitations and leg swelling.   Gastrointestinal: Negative  for abdominal distention, abdominal pain, constipation, diarrhea, nausea and vomiting.   Endocrine: Negative for cold intolerance, heat intolerance, polydipsia, polyphagia and polyuria.   Genitourinary: Negative for difficulty urinating, dysuria, flank pain, frequency, hematuria and pelvic pain.   Musculoskeletal: Positive for arthralgias (left shoulder and right knee). Negative for back pain, joint swelling, myalgias, neck pain and neck stiffness.   Skin: Positive for wound (abasion left shoulder from fall). Negative for color change, pallor and rash.   Allergic/Immunologic: Negative for environmental allergies, food allergies and immunocompromised state.   Neurological: Positive for dizziness (says the morphine made her dizzy). Negative for tremors, syncope, weakness, light-headedness, numbness and headaches.   Hematological: Negative for adenopathy. Does not bruise/bleed easily.   Psychiatric/Behavioral: Negative for agitation, behavioral problems and confusion.       History  History reviewed. No pertinent past medical history.  Past Surgical History:   Procedure Laterality Date   • HIP SURGERY         Family History:  - Patient reports strong family history of coronary artery disease    Social History   Substance Use Topics   • Smoking status: Former Smoker   • Smokeless tobacco: Not on file   • Alcohol use No     Prescriptions Prior to Admission   Medication Sig Dispense Refill Last Dose   • diphenhydrAMINE (BENADRYL) 25 mg capsule Take 25 mg by mouth 2 (Two) Times a Day As Needed for Itching, Allergies or Sleep.   Past Month at Unknown time   • multivitamin (DAILY RED) tablet tablet Take 1 tablet by mouth Daily.   4/6/2018 at am     Allergies:  Penicillins    Objective     Vital Signs  Temp:  [97.1 °F (36.2 °C)-98.4 °F (36.9 °C)] 98.3 °F (36.8 °C)  Heart Rate:  [] 96  Resp:  [16-20] 20  BP: (137-153)/(64-70) 153/70  Body mass index is 18.96 kg/m².    Physical Exam:  Physical Exam   Constitutional: She  is oriented to person, place, and time. She appears well-developed and well-nourished.   No acute distress but appears uncomfortable   HENT:   Head: Normocephalic and atraumatic.   Mouth/Throat: Oropharynx is clear and moist.   Eyes: Conjunctivae and EOM are normal. Pupils are equal, round, and reactive to light.   Neck: Normal range of motion. Neck supple. No JVD present.   Cardiovascular:   No murmur heard.  Tachycardic, regular rhythm   Pulmonary/Chest: Effort normal and breath sounds normal. No respiratory distress. She has no wheezes. She exhibits no tenderness.   Abdominal: Soft. Bowel sounds are normal. She exhibits no distension. There is no tenderness.   Musculoskeletal: She exhibits deformity (some swelling noted around left shoulder). She exhibits no edema.   Left upper extremity in sling and right knee in immobilizer per ortho recommendations   Lymphadenopathy:     She has no cervical adenopathy.   Neurological: She is alert and oriented to person, place, and time.   No gross focal deficits   Skin: Skin is warm and dry. Capillary refill takes less than 2 seconds. No erythema.   Abrasion noted on left shoulder from fall   Psychiatric: She has a normal mood and affect. Her behavior is normal. Judgment and thought content normal.         Results Review:       Lab Results:    Results from last 7 days  Lab Units 04/06/18  1920   WBC 10*3/mm3 16.10*   HEMOGLOBIN g/dL 10.2*   PLATELETS 10*3/mm3 353           Results from last 7 days  Lab Units 04/06/18  1920   SODIUM mmol/L 137   POTASSIUM mmol/L 4.2   CHLORIDE mmol/L 107   CO2 mmol/L 25.8   BUN mg/dL 17   CREATININE mg/dL 0.64   CALCIUM mg/dL 9.3   GLUCOSE mg/dL 109         No results found for: HGBA1C    Results from last 7 days  Lab Units 04/06/18  1920   BILIRUBIN mg/dL 0.4   ALK PHOS U/L 86   AST (SGOT) U/L 27   ALT (SGPT) U/L 19       Results from last 7 days  Lab Units 04/06/18  2120 04/06/18  1920   CK TOTAL U/L 97 99   TROPONIN I ng/mL 0.101* 0.065*    CK MB INDEX % 3.2*  --    MYOGLOBIN ng/mL 102.0  --            Results from last 7 days  Lab Units 04/06/18  1920   INR  1.04           I have reviewed the patient's laboratory results.    Imaging:  Imaging Results (last 72 hours)     Procedure Component Value Units Date/Time    XR Chest 1 View [[id number]] Updated:  04/06/18 2000    CT Upper Extremity Left Without Contrast [[id number]] Resulted:  04/06/18 1908     Updated:  04/06/18 1911    XR Shoulder 2+ View Left [[id number]] Collected:  04/06/18 1523     Updated:  04/06/18 1623    Narrative:       EXAMINATION: XR SHOULDER 2+ VW LEFT-      CLINICAL INDICATION:fall        COMPARISON: None      TECHNIQUE: 2 views left shoulder     FINDINGS:   Left humerus neck fracture. Angulation without significant displacement.            Impression:       Angulated but nondisplaced left humeral neck fracture.     This report was finalized on 4/6/2018 3:24 PM by Dr. Viraj Appiah MD.       XR Knee 3 View Right [[id number]] Collected:  04/06/18 1524     Updated:  04/06/18 1623    Narrative:       EXAMINATION: XR KNEE 3 VW RIGHT-      CLINICAL INDICATION:fall        COMPARISON: None      TECHNIQUE: 3 views right knee     FINDINGS:   Fat fluid level suprapatellar bursa indicates lipohemarthrosis. Medial  tibial plateau fracture not displaced. No dislocation.           Impression:       Nondisplaced medial tibial plateau fracture with associated  lipohemarthrosis.     This report was finalized on 4/6/2018 3:24 PM by Dr. Viraj Appiah MD.           Chest XR: no obvious infiltrate, pleural effusion or pulmonary edema appreciated.    I have personally reviewed the patient's radiologic imaging.        EKG: Sinus tachycardia, . QTc 438. Electronic interpretation mentions possible old septal infarct but I do not appreciate such an abnormality.  No overt ST changes to suggest acute ischemia.     I have personally reviewed the patient's EKG.        Assessment/Plan     Active  Problems:  - Traumatic left humeral neck fracture and right tibial plateau fracture: admitted to the telemetry floor. Left arm in sling and right knee in immobilizer per ortho recommendations. Orthopedic surgery will see in consultation tomorrow. Will make NPO after midnight. Due to rising troponins (see below), will need cardiac evaluation prior to surgery.  - Indeterminate troponin elevation: initially suspected this was probably due to CPK spillover from traumatic injuries above. However, CPK is normal and yet troponin continues to trend upward. No documented history of CAD but strong family history reported. No active anginal symptoms or acute findings on EKG. Will go ahead and give full dose ASA empirically at this time while continuing to trend cardiac enzymes. Risk stratify by checking hemoglobin A1c and fasting lipid panel in the morning. ECHO ordered for tomorrow. Will consult cardiology for perioperative evaluation prior to proceeding with surgery.   - Leukocytosis: felt to be reactive from above traumatic fractures. No signs/symptoms of infection at this time. Continue to monitor closely, repeat labs in the morning.      DVT Prophylaxis: SCD to left leg    Estimated Length of Stay >2 midnights    I discussed the patient's findings, assessment and plan with the patient and her RN April on [facility].    * Patient is high risk due to rising troponin elevation following traumatic left humeral neck and right tibial plateau fractures earlier today    Cornel Andrade MD  04/06/18  10:43 PM      Electronically signed by Cornel Andrade MD at 4/6/2018 11:08 PM       Hospital Medications (active)       Dose Frequency Start End    carbidopa-levodopa (SINEMET)  MG per tablet 1 tablet 1 tablet Every 8 Hours Scheduled 4/10/2018     Sig - Route: Take 1 tablet by mouth Every 8 (Eight) Hours. - Oral    docusate sodium (COLACE) capsule 100 mg 100 mg 2 Times Daily 4/11/2018     Sig - Route: Take 1 capsule by  "mouth 2 (Two) Times a Day. - Oral    doxycycline (MONODOX) capsule 100 mg 100 mg Every 12 Hours Scheduled 4/7/2018 4/14/2018    Sig - Route: Take 1 capsule by mouth Every 12 (Twelve) Hours. - Oral    enoxaparin (LOVENOX) syringe 40 mg 40 mg Daily 4/9/2018     Sig - Route: Inject 0.4 mL under the skin Daily. - Subcutaneous    ferrous sulfate tablet 325 mg 325 mg Daily With Breakfast 4/9/2018     Sig - Route: Take 1 tablet by mouth Daily With Breakfast. - Oral    HYDROcodone-acetaminophen (NORCO) 5-325 MG per tablet 1 tablet 1 tablet Every 4 Hours PRN 4/8/2018 4/18/2018    Sig - Route: Take 1 tablet by mouth Every 4 (Four) Hours As Needed for Moderate Pain . - Oral    lactobacillus acidophilus (RISAQUAD) capsule 1 capsule 1 capsule Daily 4/11/2018     Sig - Route: Take 1 capsule by mouth Daily. - Oral    multivitamin (DAILY RED) tablet 1 tablet 1 tablet Daily 4/7/2018     Sig - Route: Take 1 tablet by mouth Daily. - Oral    naloxone (NARCAN) injection 0.4 mg 0.4 mg Every 5 Minutes PRN 4/6/2018     Sig - Route: Infuse 1 mL into a venous catheter Every 5 (Five) Minutes As Needed for Respiratory Depression. - Intravenous    Linked Group 1:  \"And\" Linked Group Details        nitroglycerin (NITROSTAT) SL tablet 0.4 mg 0.4 mg Every 5 Minutes PRN 4/6/2018     Sig - Route: Place 1 tablet under the tongue Every 5 (Five) Minutes As Needed for Chest Pain (if systolic BP greater than 100 mm/Hg.). - Sublingual    ondansetron (ZOFRAN) injection 4 mg 4 mg Every 6 Hours PRN 4/7/2018     Sig - Route: Infuse 2 mL into a venous catheter Every 6 (Six) Hours As Needed for Nausea or Vomiting. - Intravenous    polyethylene glycol 3350 powder (packet) 17 g Daily PRN 4/11/2018     Sig - Route: Take 17 g by mouth Daily As Needed for Constipation. - Oral    sodium chloride 0.9 % flush 1-10 mL 1-10 mL As Needed 4/6/2018     Sig - Route: Infuse 1-10 mL into a venous catheter As Needed for Line Care. - Intravenous    sodium chloride 0.9 % flush " "10 mL 10 mL As Needed 4/6/2018     Sig - Route: Infuse 10 mL into a venous catheter As Needed for Line Care. - Intravenous    Linked Group 2:  \"And\" Linked Group Details        HYDROmorphone (DILAUDID) injection 0.5 mg (Discontinued) 0.5 mg Every 4 Hours PRN 4/6/2018 4/11/2018    Sig - Route: Infuse 0.5 mL into a venous catheter Every 4 (Four) Hours As Needed for Severe Pain  (hold for oversedation, SBP<100). - Intravenous            Lab Results (last 24 hours)     Procedure Component Value Units Date/Time    Basic Metabolic Panel [[id number]]  (Abnormal) Collected:  04/11/18 0115    Specimen:  Blood Updated:  04/11/18 0222     Glucose 92 mg/dL      BUN 19 mg/dL      Creatinine 0.46 mg/dL      Sodium 138 mmol/L      Potassium 4.0 mmol/L      Chloride 102 mmol/L      CO2 30.4 mmol/L      Calcium 9.2 mg/dL      eGFR Non African Amer 134 mL/min/1.73      BUN/Creatinine Ratio 41.3 (H)     Anion Gap 5.6 mmol/L     Narrative:       The MDRD GFR formula is only valid for adults with stable renal function between ages 18 and 70.    Osmolality, Calculated [[id number]]  (Normal) Collected:  04/11/18 0115    Specimen:  Blood Updated:  04/11/18 0222     Osmolality Calc 277.6 mOsm/kg     CBC & Differential [[id number]] Collected:  04/11/18 0115    Specimen:  Blood Updated:  04/11/18 0158    Narrative:       The following orders were created for panel order CBC & Differential.  Procedure                               Abnormality         Status                     ---------                               -----------         ------                     CBC Auto Differential[[id number]]        Abnormal            Final result                 Please view results for these tests on the individual orders.    CBC Auto Differential [[id number]]  (Abnormal) Collected:  04/11/18 0115    Specimen:  Blood Updated:  04/11/18 0158     WBC 8.07 10*3/mm3      RBC 2.82 (L) 10*6/mm3      Hemoglobin 8.7 (L) g/dL      Hematocrit 27.6 (L) %      MCV 97.9 " "(H) fL      MCH 30.9 pg      MCHC 31.5 (L) g/dL      RDW 13.5 %      RDW-SD 44.9 fl      MPV 9.9 fL      Platelets 404 (H) 10*3/mm3      Neutrophil % 62.1 %      Lymphocyte % 24.5 %      Monocyte % 10.7 %      Eosinophil % 2.1 %      Basophil % 0.4 %      Immature Grans % 0.2 %      Neutrophils, Absolute 5.01 10*3/mm3      Lymphocytes, Absolute 1.98 10*3/mm3      Monocytes, Absolute 0.86 10*3/mm3      Eosinophils, Absolute 0.17 10*3/mm3      Basophils, Absolute 0.03 10*3/mm3      Immature Grans, Absolute 0.02 10*3/mm3            Physician Progress Notes (last 24 hours) (Notes from 4/10/2018  3:05 PM through 2018  3:05 PM)      Анна Mai DO at 4/10/2018  3:26 PM          HOSPITALIST PROGRESS NOTE    Patient Identification:  Name:  Debra Resendiz  Age:  72 y.o.  Sex:  female  :  1945  MRN:  [id number]  Visit Number:  [id number]  Primary Care Provider:  No Known Provider    Length of stay:  4     HPI: 71 yo female admitted with L shoulder and R knee pain after a fall    Procedures:  -Left proximal humerus ORIF with plate and screws    Subjective:  The patient was resting in bed this afternoon.  She complained of \"soreness.\"  She reports that is mostly in her left shoulder, right hip and right knee.  The patient was assisted to chair today with physical therapy.      The patient otherwise has no new complaints and specifically denies any nausea, vomiting, chest pain and/or shortness of breath.    Present during exam: TAMEKA Mclaughlin    Current Hospital Meds:    carbidopa-levodopa 1 tablet Oral Q8H   doxycycline 100 mg Oral Q12H   enoxaparin 40 mg Subcutaneous Daily   ferrous sulfate 325 mg Oral Daily With Breakfast   multivitamin 1 tablet Oral Daily     Vital Signs  Temp:  [98.2 °F (36.8 °C)-99.7 °F (37.6 °C)] 98.4 °F (36.9 °C)  Heart Rate:  [] 97  Resp:  [14-18] 18  BP: (112-141)/(44-62) 112/53  1    18  0328 18  0430 04/10/18  0323   Weight: 50.8 kg (112 lb) 54.4 kg (120 lb) 55.8 " kg (123 lb)     Body mass index is 19.85 kg/m².    Physical exam:  Physical Exam   Constitutional: She is oriented to person, place, and time. She appears well-developed and well-nourished. No distress.   HENT:   Head: Normocephalic and atraumatic.   Mouth/Throat: Oropharynx is clear and moist.   Eyes: Conjunctivae and EOM are normal. Pupils are equal, round, and reactive to light.   Neck: Neck supple. No tracheal deviation present. No thyromegaly present.   Cardiovascular: Normal rate and regular rhythm.  Exam reveals no gallop and no friction rub.    No murmur heard.  Pulmonary/Chest: Breath sounds normal. No respiratory distress. She has no wheezes. She has no rales.   Cardiovascular and pulmonary examination may be difficult due to left sided immobilizer that is in place   Abdominal: Soft. Bowel sounds are normal. She exhibits no distension. There is no tenderness. There is no guarding.   Musculoskeletal: She exhibits no edema.        Right knee: She exhibits decreased range of motion.   Right knee immobilizer in place   Neurological: She is alert and oriented to person, place, and time. No cranial nerve deficit.   Question of tremor in right lower extremity noted on my exam; NVI REUBENE   Skin: Skin is warm and dry. No rash noted. No erythema.   Psychiatric: She has a normal mood and affect.      Results Review:    Results from last 7 days  Lab Units 04/10/18  0317 04/09/18  0215 04/08/18  1510 04/08/18  0318 04/07/18  0207 04/06/18  1920   WBC 10*3/mm3 7.78 9.67  --  10.06 11.72 16.10*   HEMOGLOBIN g/dL 8.9* 8.7* 10.0* 10.1* 9.6* 10.2*   HEMATOCRIT % 27.7* 27.4* 30.9* 31.1* 30.1* 31.1*   PLATELETS 10*3/mm3 345 323  --  306 330 353       Results from last 7 days  Lab Units 04/10/18  0317 04/09/18  0215 04/08/18  0318 04/07/18  0208 04/06/18  1920   SODIUM mmol/L 137 134* 135 136 137   POTASSIUM mmol/L 3.9 3.9 3.9 4.0 4.2   CHLORIDE mmol/L 103 102 103 108 107   CO2 mmol/L 31.2 26.6 24.7 19.9* 25.8   BUN mg/dL 15 15  16 18 17   CREATININE mg/dL 0.45 0.58 0.55 0.64 0.64   CALCIUM mg/dL 9.2 9.0 9.3 9.0 9.3   GLUCOSE mg/dL 94 94 87 113* 109       Results from last 7 days  Lab Units 04/07/18  0208 04/06/18  1920   BILIRUBIN mg/dL 0.4 0.4   ALK PHOS U/L 80 86   AST (SGOT) U/L 26 27   ALT (SGPT) U/L 19 19           Results from last 7 days  Lab Units 04/07/18  0536 04/06/18  1920   INR  1.14* 1.04       Results from last 7 days  Lab Units 04/08/18  1510 04/07/18  0926 04/07/18  0208 04/06/18  2120   CK TOTAL U/L  --  112 112 97   TROPONIN I ng/mL 0.068* 0.131* 0.158* 0.101*   CK MB INDEX %  --  2.2 2.7 3.2*   MYOGLOBIN ng/mL  --  58.0 96.0 102.0       Results from last 7 days  Lab Units 04/06/18  1920   BNP pg/mL 71.0         Assessment/Plan     -Closed left proximal humerus fracture status post ORIF, POD #2  -Acute blood loss anemia  -Right tibial plateau fracture status post fall with recommendations for conservative therapy with nonweightbearing bearing status ×6 weeks  -Indeterminate troponin with occasional palpitations; troponin level has improved  -Questionable atypical pneumonia patient on a course of doxycycline  -Mild tachycardia that appears to have improved  -And leg and ankle pain with negative Doppler ultrasound studies  -Questionable early parkinsonism     We will continue continue with doxycycline for now.  Continue with subcutaneous Lovenox.  Continue low-dose as needed analgesics for pain.  Continue to encourage physical and occupational therapy as tolerated. Awaiting to hear back from acute physical rehabilitation. I think that the patient would benefit greatly before returning home with her son.    A neurology consult has been placed.  Vitamin B12, folate and thyroid studies were within normal limits.    Continue to follow her hemoglobin and hematocrit closely and transfuse for hemoglobin less than 7.  I will repeat her CBC and BMP in the morning.    The patient is high risk due to the following diagnoses/reasons:   Left humerus fracture, right tibial plateau fracture, indeterminate troponin, question of parkinsonism    I discussed the patients findings and my recommendations with patient and nursing staff.    Disposition  Unclear; hopefully inpatient rehab prior to returning home      Анна Mai DO  04/10/18  3:26 PM        Electronically signed by Анна Mai DO at 4/10/2018  6:11 PM       Consult Notes (last 24 hours) (Notes from 4/10/2018  3:05 PM through 4/11/2018  3:05 PM)     No notes of this type exist for this encounter.

## 2018-04-11 NOTE — PROGRESS NOTES
Discharge Planning Assessment  [facility]     Patient Name: Debra Resendiz  MRN: [id number]  Today's Date: 4/11/2018    Admit Date: 4/6/2018          Discharge Needs Assessment    No documentation.           Discharge Plan     Row Name 04/11/18 9772       Plan    Plan SS spoke with pt and son on this date regarding discharge plans.  Pt and son agreeable to possibility of short term nursing home placement for rehab in [facility].  SS faxed pt information to [facility] and provided pt's son with a list of nursing homes in [facility].  SS will continue to follow and assist with discharge plans.    Patient/Family in Agreement with Plan yes    Row Name 04/11/18 6283       Plan    Plan SS spoke with [facility] per Lidia who stated that pt was not a candidate for [facility] at this time.  SS will follow and assist with discharge plans.         Destination     No service coordination in this encounter.      Durable Medical Equipment     No service coordination in this encounter.      Dialysis/Infusion     No service coordination in this encounter.      Home Medical Care     No service coordination in this encounter.      Social Care     No service coordination in this encounter.        Expected Discharge Date and Time     Expected Discharge Date Expected Discharge Time    Apr 12, 2018               Demographic Summary    No documentation.           Functional Status    No documentation.           Psychosocial    No documentation.           Abuse/Neglect    No documentation.           Legal    No documentation.           Substance Abuse    No documentation.           Patient Forms    No documentation.         Meg Fang

## 2018-04-11 NOTE — DISCHARGE PLACEMENT REQUEST
"Debar Resendiz (72 y.o. Female)     Date of Birth Social Security Number Address Home Phone MRN    1945  [address]  [facility] [address] [phone] [id number]    [place] Marital Status          None        Admission Date Admission Type Admitting Provider Attending Provider Department, Room/Bed    18 Emergency Cornel Andrade MD Grace, Aimee Russell, DO [address], 3320/1P    Discharge Date Discharge Disposition Discharge Destination                       Attending Provider:  Анна Mai DO    Allergies:  Penicillins    Isolation:  None   Infection:  None   Code Status:  FULL    Ht:  167.6 cm (66\")   Wt:  53.5 kg (118 lb)    Admission Cmt:  None   Principal Problem:  None                Active Insurance as of 2018     Primary Coverage     Payor Plan Insurance Group Employer/Plan Group    MEDICARE MEDICARE A & B      Payor Plan Address Payor Plan Phone Number Effective From Effective To    [address] [phone] [address]     [place] [address]       Subscriber Name Subscriber Birth Date Member ID       DEBRA RESENDIZ 1945 [id number]                 Emergency Contacts      (Rel.) Home Phone Work Phone Mobile Phone    Diaz Resendiz (Son) [phone] -- --            Emergency Contact Information     Name Relation Home Work Mobile    Diaz Resendiz Son [phone]            Insurance Information                MEDICARE/MEDICARE A & B Phone: [phone]    Subscriber: Debra Resendiz Subscriber#: [id number]    Group#:  Precert#:           Problem List           Codes Noted - Resolved       Hospital    Closed displaced comminuted fracture of shaft of left humerus ICD-10-CM: S42.352A  ICD-9-CM: 812.21 2018 - Present             History & Physical      Cornel Andrade MD at 2018 10:43 PM          Hospitalist History and Physical        Patient Identification  Name: Debra Resendiz  Age/Sex: 72 y.o. female  :  1945      "   MRN: [id number]  Visit Number: [id number]  PCP: No Known Provider        Chief complaint left shoulder, right knee pain after falling    History of Present Illness:  Patient is a 72 y.o. female who presents with pain in her left shoulder and right knee after falling at home. She states she was walking down a steep embankment outside her house when her feet slipped out from under her and she toppled down several feet and landed on gravel. She had immediate pain in her left shoulder and right knee. She was unable to get up but fortunately a relative who was nearby came to check on her and found her on the ground after a few minutes. She was brought to our ED and found to have a left humeral neck fracture and a right medial tibial plateau fracture. Her basic labs showed leukocytosis which is felt to be reactive from the fall and fractures, as she has no signs/symptoms of infection at this time. Initial troponin was indeterminate and repeat continues to rise, without a corresponding elevated CPK to explain possible spillover. Patient denies any chest pain or shortness of breath. EKG does not show any evidence of acute ischemia. She denies history of coronary artery disease. She does state that coronary artery disease runs in her family, however. She has been admitted to the telemetry floor for further management of above-mentioned fractures as well as workup for indeterminate troponin elevation.     Review of Systems  Review of Systems   Constitutional: Negative for activity change, appetite change, chills, diaphoresis, fatigue, fever and unexpected weight change.   HENT: Negative for congestion, postnasal drip, rhinorrhea, sinus pressure and sore throat.    Eyes: Negative for photophobia, pain, discharge, redness, itching and visual disturbance.   Respiratory: Negative for cough, shortness of breath and wheezing.    Cardiovascular: Negative for chest pain, palpitations and leg swelling.   Gastrointestinal: Negative  for abdominal distention, abdominal pain, constipation, diarrhea, nausea and vomiting.   Endocrine: Negative for cold intolerance, heat intolerance, polydipsia, polyphagia and polyuria.   Genitourinary: Negative for difficulty urinating, dysuria, flank pain, frequency, hematuria and pelvic pain.   Musculoskeletal: Positive for arthralgias (left shoulder and right knee). Negative for back pain, joint swelling, myalgias, neck pain and neck stiffness.   Skin: Positive for wound (abasion left shoulder from fall). Negative for color change, pallor and rash.   Allergic/Immunologic: Negative for environmental allergies, food allergies and immunocompromised state.   Neurological: Positive for dizziness (says the morphine made her dizzy). Negative for tremors, syncope, weakness, light-headedness, numbness and headaches.   Hematological: Negative for adenopathy. Does not bruise/bleed easily.   Psychiatric/Behavioral: Negative for agitation, behavioral problems and confusion.       History  History reviewed. No pertinent past medical history.  Past Surgical History:   Procedure Laterality Date   • HIP SURGERY         Family History:  - Patient reports strong family history of coronary artery disease    Social History   Substance Use Topics   • Smoking status: Former Smoker   • Smokeless tobacco: Not on file   • Alcohol use No     Prescriptions Prior to Admission   Medication Sig Dispense Refill Last Dose   • diphenhydrAMINE (BENADRYL) 25 mg capsule Take 25 mg by mouth 2 (Two) Times a Day As Needed for Itching, Allergies or Sleep.   Past Month at Unknown time   • multivitamin (DAILY RED) tablet tablet Take 1 tablet by mouth Daily.   4/6/2018 at am     Allergies:  Penicillins    Objective     Vital Signs  Temp:  [97.1 °F (36.2 °C)-98.4 °F (36.9 °C)] 98.3 °F (36.8 °C)  Heart Rate:  [] 96  Resp:  [16-20] 20  BP: (137-153)/(64-70) 153/70  Body mass index is 18.96 kg/m².    Physical Exam:  Physical Exam   Constitutional: She  is oriented to person, place, and time. She appears well-developed and well-nourished.   No acute distress but appears uncomfortable   HENT:   Head: Normocephalic and atraumatic.   Mouth/Throat: Oropharynx is clear and moist.   Eyes: Conjunctivae and EOM are normal. Pupils are equal, round, and reactive to light.   Neck: Normal range of motion. Neck supple. No JVD present.   Cardiovascular:   No murmur heard.  Tachycardic, regular rhythm   Pulmonary/Chest: Effort normal and breath sounds normal. No respiratory distress. She has no wheezes. She exhibits no tenderness.   Abdominal: Soft. Bowel sounds are normal. She exhibits no distension. There is no tenderness.   Musculoskeletal: She exhibits deformity (some swelling noted around left shoulder). She exhibits no edema.   Left upper extremity in sling and right knee in immobilizer per ortho recommendations   Lymphadenopathy:     She has no cervical adenopathy.   Neurological: She is alert and oriented to person, place, and time.   No gross focal deficits   Skin: Skin is warm and dry. Capillary refill takes less than 2 seconds. No erythema.   Abrasion noted on left shoulder from fall   Psychiatric: She has a normal mood and affect. Her behavior is normal. Judgment and thought content normal.         Results Review:       Lab Results:    Results from last 7 days  Lab Units 04/06/18  1920   WBC 10*3/mm3 16.10*   HEMOGLOBIN g/dL 10.2*   PLATELETS 10*3/mm3 353           Results from last 7 days  Lab Units 04/06/18  1920   SODIUM mmol/L 137   POTASSIUM mmol/L 4.2   CHLORIDE mmol/L 107   CO2 mmol/L 25.8   BUN mg/dL 17   CREATININE mg/dL 0.64   CALCIUM mg/dL 9.3   GLUCOSE mg/dL 109         No results found for: HGBA1C    Results from last 7 days  Lab Units 04/06/18  1920   BILIRUBIN mg/dL 0.4   ALK PHOS U/L 86   AST (SGOT) U/L 27   ALT (SGPT) U/L 19       Results from last 7 days  Lab Units 04/06/18  2120 04/06/18  1920   CK TOTAL U/L 97 99   TROPONIN I ng/mL 0.101* 0.065*    CK MB INDEX % 3.2*  --    MYOGLOBIN ng/mL 102.0  --            Results from last 7 days  Lab Units 04/06/18  1920   INR  1.04           I have reviewed the patient's laboratory results.    Imaging:  Imaging Results (last 72 hours)     Procedure Component Value Units Date/Time    XR Chest 1 View [[id number]] Updated:  04/06/18 2000    CT Upper Extremity Left Without Contrast [[id number]] Resulted:  04/06/18 1908     Updated:  04/06/18 1911    XR Shoulder 2+ View Left [[id number]] Collected:  04/06/18 1523     Updated:  04/06/18 1623    Narrative:       EXAMINATION: XR SHOULDER 2+ VW LEFT-      CLINICAL INDICATION:fall        COMPARISON: None      TECHNIQUE: 2 views left shoulder     FINDINGS:   Left humerus neck fracture. Angulation without significant displacement.            Impression:       Angulated but nondisplaced left humeral neck fracture.     This report was finalized on 4/6/2018 3:24 PM by Dr. Viraj Appiah MD.       XR Knee 3 View Right [[id number]] Collected:  04/06/18 1524     Updated:  04/06/18 1623    Narrative:       EXAMINATION: XR KNEE 3 VW RIGHT-      CLINICAL INDICATION:fall        COMPARISON: None      TECHNIQUE: 3 views right knee     FINDINGS:   Fat fluid level suprapatellar bursa indicates lipohemarthrosis. Medial  tibial plateau fracture not displaced. No dislocation.           Impression:       Nondisplaced medial tibial plateau fracture with associated  lipohemarthrosis.     This report was finalized on 4/6/2018 3:24 PM by Dr. Viraj Appiah MD.           Chest XR: no obvious infiltrate, pleural effusion or pulmonary edema appreciated.    I have personally reviewed the patient's radiologic imaging.        EKG: Sinus tachycardia, . QTc 438. Electronic interpretation mentions possible old septal infarct but I do not appreciate such an abnormality.  No overt ST changes to suggest acute ischemia.     I have personally reviewed the patient's EKG.        Assessment/Plan     Active  Problems:  - Traumatic left humeral neck fracture and right tibial plateau fracture: admitted to the telemetry floor. Left arm in sling and right knee in immobilizer per ortho recommendations. Orthopedic surgery will see in consultation tomorrow. Will make NPO after midnight. Due to rising troponins (see below), will need cardiac evaluation prior to surgery.  - Indeterminate troponin elevation: initially suspected this was probably due to CPK spillover from traumatic injuries above. However, CPK is normal and yet troponin continues to trend upward. No documented history of CAD but strong family history reported. No active anginal symptoms or acute findings on EKG. Will go ahead and give full dose ASA empirically at this time while continuing to trend cardiac enzymes. Risk stratify by checking hemoglobin A1c and fasting lipid panel in the morning. ECHO ordered for tomorrow. Will consult cardiology for perioperative evaluation prior to proceeding with surgery.   - Leukocytosis: felt to be reactive from above traumatic fractures. No signs/symptoms of infection at this time. Continue to monitor closely, repeat labs in the morning.      DVT Prophylaxis: SCD to left leg    Estimated Length of Stay >2 midnights    I discussed the patient's findings, assessment and plan with the patient and her RN April on [facility].    * Patient is high risk due to rising troponin elevation following traumatic left humeral neck and right tibial plateau fractures earlier today    Cornel Andrade MD  04/06/18  10:43 PM      Electronically signed by Cornel Andrade MD at 4/6/2018 11:08 PM       Hospital Medications (active)       Dose Frequency Start End    carbidopa-levodopa (SINEMET)  MG per tablet 1 tablet 1 tablet Every 8 Hours Scheduled 4/10/2018     Sig - Route: Take 1 tablet by mouth Every 8 (Eight) Hours. - Oral    docusate sodium (COLACE) capsule 100 mg 100 mg 2 Times Daily 4/11/2018     Sig - Route: Take 1 capsule by  "mouth 2 (Two) Times a Day. - Oral    doxycycline (MONODOX) capsule 100 mg 100 mg Every 12 Hours Scheduled 4/7/2018 4/14/2018    Sig - Route: Take 1 capsule by mouth Every 12 (Twelve) Hours. - Oral    enoxaparin (LOVENOX) syringe 40 mg 40 mg Daily 4/9/2018     Sig - Route: Inject 0.4 mL under the skin Daily. - Subcutaneous    ferrous sulfate tablet 325 mg 325 mg Daily With Breakfast 4/9/2018     Sig - Route: Take 1 tablet by mouth Daily With Breakfast. - Oral    HYDROcodone-acetaminophen (NORCO) 5-325 MG per tablet 1 tablet 1 tablet Every 4 Hours PRN 4/8/2018 4/18/2018    Sig - Route: Take 1 tablet by mouth Every 4 (Four) Hours As Needed for Moderate Pain . - Oral    multivitamin (DAILY RED) tablet 1 tablet 1 tablet Daily 4/7/2018     Sig - Route: Take 1 tablet by mouth Daily. - Oral    naloxone (NARCAN) injection 0.4 mg 0.4 mg Every 5 Minutes PRN 4/6/2018     Sig - Route: Infuse 1 mL into a venous catheter Every 5 (Five) Minutes As Needed for Respiratory Depression. - Intravenous    Linked Group 1:  \"And\" Linked Group Details        nitroglycerin (NITROSTAT) SL tablet 0.4 mg 0.4 mg Every 5 Minutes PRN 4/6/2018     Sig - Route: Place 1 tablet under the tongue Every 5 (Five) Minutes As Needed for Chest Pain (if systolic BP greater than 100 mm/Hg.). - Sublingual    ondansetron (ZOFRAN) injection 4 mg 4 mg Every 6 Hours PRN 4/7/2018     Sig - Route: Infuse 2 mL into a venous catheter Every 6 (Six) Hours As Needed for Nausea or Vomiting. - Intravenous    polyethylene glycol 3350 powder (packet) 17 g Daily PRN 4/11/2018     Sig - Route: Take 17 g by mouth Daily As Needed for Constipation. - Oral    sodium chloride 0.9 % flush 1-10 mL 1-10 mL As Needed 4/6/2018     Sig - Route: Infuse 1-10 mL into a venous catheter As Needed for Line Care. - Intravenous    sodium chloride 0.9 % flush 10 mL 10 mL As Needed 4/6/2018     Sig - Route: Infuse 10 mL into a venous catheter As Needed for Line Care. - Intravenous    Linked Group " "2:  \"And\" Linked Group Details        HYDROmorphone (DILAUDID) injection 0.5 mg (Discontinued) 0.5 mg Every 4 Hours PRN 4/6/2018 4/11/2018    Sig - Route: Infuse 0.5 mL into a venous catheter Every 4 (Four) Hours As Needed for Severe Pain  (hold for oversedation, SBP<100). - Intravenous            Lab Results (last 24 hours)     Procedure Component Value Units Date/Time    Basic Metabolic Panel [[id number]]  (Abnormal) Collected:  04/11/18 0115    Specimen:  Blood Updated:  04/11/18 0222     Glucose 92 mg/dL      BUN 19 mg/dL      Creatinine 0.46 mg/dL      Sodium 138 mmol/L      Potassium 4.0 mmol/L      Chloride 102 mmol/L      CO2 30.4 mmol/L      Calcium 9.2 mg/dL      eGFR Non African Amer 134 mL/min/1.73      BUN/Creatinine Ratio 41.3 (H)     Anion Gap 5.6 mmol/L     Narrative:       The MDRD GFR formula is only valid for adults with stable renal function between ages 18 and 70.    Osmolality, Calculated [[id number]]  (Normal) Collected:  04/11/18 0115    Specimen:  Blood Updated:  04/11/18 0222     Osmolality Calc 277.6 mOsm/kg     CBC & Differential [[id number]] Collected:  04/11/18 0115    Specimen:  Blood Updated:  04/11/18 0158    Narrative:       The following orders were created for panel order CBC & Differential.  Procedure                               Abnormality         Status                     ---------                               -----------         ------                     CBC Auto Differential[[id number]]        Abnormal            Final result                 Please view results for these tests on the individual orders.    CBC Auto Differential [[id number]]  (Abnormal) Collected:  04/11/18 0115    Specimen:  Blood Updated:  04/11/18 0158     WBC 8.07 10*3/mm3      RBC 2.82 (L) 10*6/mm3      Hemoglobin 8.7 (L) g/dL      Hematocrit 27.6 (L) %      MCV 97.9 (H) fL      MCH 30.9 pg      MCHC 31.5 (L) g/dL      RDW 13.5 %      RDW-SD 44.9 fl      MPV 9.9 fL      Platelets 404 (H) 10*3/mm3      " Neutrophil % 62.1 %      Lymphocyte % 24.5 %      Monocyte % 10.7 %      Eosinophil % 2.1 %      Basophil % 0.4 %      Immature Grans % 0.2 %      Neutrophils, Absolute 5.01 10*3/mm3      Lymphocytes, Absolute 1.98 10*3/mm3      Monocytes, Absolute 0.86 10*3/mm3      Eosinophils, Absolute 0.17 10*3/mm3      Basophils, Absolute 0.03 10*3/mm3      Immature Grans, Absolute 0.02 10*3/mm3         Orders (last 24 hrs)     Start     Ordered    04/11/18 1800  Dietary Nutrition Supplements Ensure Plus  Daily With Breakfast, Lunch & Dinner     Comments:  Patient requests strawberry    04/11/18 1203    04/11/18 1200  docusate sodium (COLACE) capsule 100 mg  2 Times Daily      04/11/18 1052    04/11/18 1052  polyethylene glycol 3350 powder (packet)  Daily PRN      04/11/18 1052    04/11/18 0600  CBC & Differential  Morning Draw      04/10/18 1811    04/11/18 0600  Basic Metabolic Panel  Morning Draw      04/10/18 1811    04/11/18 0600  CBC Auto Differential  PROCEDURE ONCE      04/11/18 0002    04/11/18 0223  Osmolality, Calculated  Once      04/11/18 0222    04/10/18 0730  carbidopa-levodopa (SINEMET)  MG per tablet 1 tablet  Every 8 Hours Scheduled      04/10/18 0611    04/09/18 0900  enoxaparin (LOVENOX) syringe 40 mg  Daily      04/08/18 1425    04/09/18 0800  ferrous sulfate tablet 325 mg  Daily With Breakfast      04/08/18 1434    04/08/18 1425  HYDROcodone-acetaminophen (NORCO) 5-325 MG per tablet 1 tablet  Every 4 Hours PRN      04/08/18 1425    04/07/18 2113  ondansetron (ZOFRAN) injection 4 mg  Every 6 Hours PRN      04/07/18 2114    04/07/18 1500  doxycycline (MONODOX) capsule 100 mg  Every 12 Hours Scheduled      04/07/18 1313    04/07/18 0900  multivitamin (DAILY RED) tablet 1 tablet  Daily      04/06/18 2223    04/06/18 2215  HYDROmorphone (DILAUDID) injection 0.5 mg  Every 4 Hours PRN,   Status:  Discontinued      04/06/18 2215    04/06/18 2156  naloxone (NARCAN) injection 0.4 mg  Every 5 Minutes PRN       "18  nitroglycerin (NITROSTAT) SL tablet 0.4 mg  Every 5 Minutes PRN      18  sodium chloride 0.9 % flush 1-10 mL  As Needed      18 142  sodium chloride 0.9 % flush 10 mL  As Needed      18    Unscheduled  ECG 12 Lead  As Needed     Comments:  Nurse to Release if Patient Expericences Acute Chest Pain or Dysrhythmias    18    Unscheduled  Potassium  As Needed     Comments:  For Ventricular Arrhythmias      18    Unscheduled  Magnesium  As Needed     Comments:  For Ventricular Arrhythmias      18    Unscheduled  Troponin  As Needed     Comments:  For Chest Pain      18    Unscheduled  Digoxin Level  As Needed     Comments:  For Atrial Arrhythmias      18    Unscheduled  Blood Gas, Arterial  As Needed     Comments:  Per [employer] PolicyNotify Physician      18    --  multivitamin (DAILY RED) tablet tablet  Daily      18    --  diphenhydrAMINE (BENADRYL) 25 mg capsule  2 Times Daily PRN      18             Physician Progress Notes (last 24 hours) (Notes from 4/10/2018  3:02 PM through 2018  3:02 PM)      Анна Mai DO at 4/10/2018  3:26 PM          HOSPITALIST PROGRESS NOTE    Patient Identification:  Name:  Debra Resendiz  Age:  72 y.o.  Sex:  female  :  1945  MRN:  [id number]  Visit Number:  [id number]  Primary Care Provider:  No Known Provider    Length of stay:  4     HPI: 73 yo female admitted with L shoulder and R knee pain after a fall    Procedures:  -Left proximal humerus ORIF with plate and screws    Subjective:  The patient was resting in bed this afternoon.  She complained of \"soreness.\"  She reports that is mostly in her left shoulder, right hip and right knee.  The patient was assisted to chair today with physical therapy.      The patient otherwise has no new complaints and specifically denies any nausea, " vomiting, chest pain and/or shortness of breath.    Present during exam: TAMEKA Mclaughlin    Current Hospital Meds:    carbidopa-levodopa 1 tablet Oral Q8H   doxycycline 100 mg Oral Q12H   enoxaparin 40 mg Subcutaneous Daily   ferrous sulfate 325 mg Oral Daily With Breakfast   multivitamin 1 tablet Oral Daily     Vital Signs  Temp:  [98.2 °F (36.8 °C)-99.7 °F (37.6 °C)] 98.4 °F (36.9 °C)  Heart Rate:  [] 97  Resp:  [14-18] 18  BP: (112-141)/(44-62) 112/53  1    04/08/18  0328 04/09/18  0430 04/10/18  0323   Weight: 50.8 kg (112 lb) 54.4 kg (120 lb) 55.8 kg (123 lb)     Body mass index is 19.85 kg/m².    Physical exam:  Physical Exam   Constitutional: She is oriented to person, place, and time. She appears well-developed and well-nourished. No distress.   HENT:   Head: Normocephalic and atraumatic.   Mouth/Throat: Oropharynx is clear and moist.   Eyes: Conjunctivae and EOM are normal. Pupils are equal, round, and reactive to light.   Neck: Neck supple. No tracheal deviation present. No thyromegaly present.   Cardiovascular: Normal rate and regular rhythm.  Exam reveals no gallop and no friction rub.    No murmur heard.  Pulmonary/Chest: Breath sounds normal. No respiratory distress. She has no wheezes. She has no rales.   Cardiovascular and pulmonary examination may be difficult due to left sided immobilizer that is in place   Abdominal: Soft. Bowel sounds are normal. She exhibits no distension. There is no tenderness. There is no guarding.   Musculoskeletal: She exhibits no edema.        Right knee: She exhibits decreased range of motion.   Right knee immobilizer in place   Neurological: She is alert and oriented to person, place, and time. No cranial nerve deficit.   Question of tremor in right lower extremity noted on my exam; ANABEL VARGHESE   Skin: Skin is warm and dry. No rash noted. No erythema.   Psychiatric: She has a normal mood and affect.      Results Review:    Results from last 7 days  Lab Units  04/10/18  0317 04/09/18  0215 04/08/18  1510 04/08/18  0318 04/07/18  0207 04/06/18  1920   WBC 10*3/mm3 7.78 9.67  --  10.06 11.72 16.10*   HEMOGLOBIN g/dL 8.9* 8.7* 10.0* 10.1* 9.6* 10.2*   HEMATOCRIT % 27.7* 27.4* 30.9* 31.1* 30.1* 31.1*   PLATELETS 10*3/mm3 345 323  --  306 330 353       Results from last 7 days  Lab Units 04/10/18  0317 04/09/18  0215 04/08/18  0318 04/07/18  0208 04/06/18 1920   SODIUM mmol/L 137 134* 135 136 137   POTASSIUM mmol/L 3.9 3.9 3.9 4.0 4.2   CHLORIDE mmol/L 103 102 103 108 107   CO2 mmol/L 31.2 26.6 24.7 19.9* 25.8   BUN mg/dL 15 15 16 18 17   CREATININE mg/dL 0.45 0.58 0.55 0.64 0.64   CALCIUM mg/dL 9.2 9.0 9.3 9.0 9.3   GLUCOSE mg/dL 94 94 87 113* 109       Results from last 7 days  Lab Units 04/07/18  0208 04/06/18 1920   BILIRUBIN mg/dL 0.4 0.4   ALK PHOS U/L 80 86   AST (SGOT) U/L 26 27   ALT (SGPT) U/L 19 19           Results from last 7 days  Lab Units 04/07/18  0536 04/06/18  1920   INR  1.14* 1.04       Results from last 7 days  Lab Units 04/08/18  1510 04/07/18  0926 04/07/18  0208 04/06/18  2120   CK TOTAL U/L  --  112 112 97   TROPONIN I ng/mL 0.068* 0.131* 0.158* 0.101*   CK MB INDEX %  --  2.2 2.7 3.2*   MYOGLOBIN ng/mL  --  58.0 96.0 102.0       Results from last 7 days  Lab Units 04/06/18  1920   BNP pg/mL 71.0         Assessment/Plan     -Closed left proximal humerus fracture status post ORIF, POD #2  -Acute blood loss anemia  -Right tibial plateau fracture status post fall with recommendations for conservative therapy with nonweightbearing bearing status ×6 weeks  -Indeterminate troponin with occasional palpitations; troponin level has improved  -Questionable atypical pneumonia patient on a course of doxycycline  -Mild tachycardia that appears to have improved  -And leg and ankle pain with negative Doppler ultrasound studies  -Questionable early parkinsonism     We will continue continue with doxycycline for now.  Continue with subcutaneous Lovenox.  Continue  low-dose as needed analgesics for pain.  Continue to encourage physical and occupational therapy as tolerated. Awaiting to hear back from acute physical rehabilitation. I think that the patient would benefit greatly before returning home with her son.    A neurology consult has been placed.  Vitamin B12, folate and thyroid studies were within normal limits.    Continue to follow her hemoglobin and hematocrit closely and transfuse for hemoglobin less than 7.  I will repeat her CBC and BMP in the morning.    The patient is high risk due to the following diagnoses/reasons:  Left humerus fracture, right tibial plateau fracture, indeterminate troponin, question of parkinsonism    I discussed the patients findings and my recommendations with patient and nursing staff.    Disposition  Unclear; hopefully inpatient rehab prior to returning home      Анна Mai DO  04/10/18  3:26 PM        Electronically signed by Анна Mai DO at 4/10/2018  6:11 PM       Consult Notes (last 24 hours) (Notes from 4/10/2018  3:02 PM through 2018  3:02 PM)     No notes of this type exist for this encounter.           Physical Therapy Notes (last 24 hours) (Notes from 4/10/2018  3:02 PM through 2018  3:02 PM)      Marta Mckeon PTA at 4/10/2018  3:44 PM  Version 1 of 1         Problem: Patient Care Overview  Goal: Plan of Care Review   04/10/18 1543   Coping/Psychosocial   Plan of Care Reviewed With patient   Coping/Psychosocial   Patient Agreement with Plan of Care agrees   OTHER   Outcome Summary Patient conitnues to require maximum assistance for all functional mobility and transferring. Conitnue current POC to tolerance for further improvements and strengtheing.            Electronically signed by Marta Mckeon PTA at 4/10/2018  3:44 PM     Marta Mckeon PTA at 4/10/2018  3:46 PM  Version 1 of 1         Acute Care - Physical Therapy Treatment Note  JUDI Aaron     Patient Name: Debra Martín BANUELOSB:  1945  MRN: [id number]  Today's Date: 4/10/2018  Onset of Illness/Injury or Date of Surgery: 04/06/18  Date of Referral to PT: 04/09/18  Referring Physician: Dr. Aburto    Admit Date: 4/6/2018    Visit Dx:    ICD-10-CM ICD-9-CM   1. Closed displaced comminuted fracture of shaft of left humerus, initial encounter S42.352A 812.21   2. Right medial tibial plateau fracture, closed, initial encounter S82.131A 823.00     Patient Active Problem List   Diagnosis   • Closed displaced comminuted fracture of shaft of left humerus       Therapy Treatment          Rehabilitation Treatment Summary     Row Name 04/10/18 1537             Treatment Time/Intention    Document Type therapy note (daily note)  -RF      Subjective Information complains of;pain  -RF      Mode of Treatment individual therapy;physical therapy   treated BID  -RF      Therapy Frequency (PT Clinical Impression) 5 times/wk   1-2 times/ day per priority policy  -RF      Patient Effort adequate  -RF      Existing Precautions/Restrictions brace worn when out of bed;fall;non-weight bearing;oxygen therapy device and L/min;weight bearing;other (see comments)   NWB L UE and R LE; L UE sling, R KI   -RF      Patient Response to Treatment Patient demonstrates need for maximal/dependent assistance with all mobility and transferring. Pt educated on importance of OOB daily and importance of complaince with HEP for conitnued strengthening.   -RF      Recorded by [RF] Marta Mckeon, CHIOMA 04/10/18 1542 04/10/18 1542      Row Name 04/10/18 1537             Cognitive Assessment/Intervention- PT/OT    Orientation Status (Cognition) oriented x 3  -RF      Follows Commands (Cognition) follows one step commands;delayed response/completion;increased processing time needed;initiation impaired;physical/tactile prompts required  -RF      Recorded by [RF] Marta Mckeon, CHIOMA 04/10/18 1542 04/10/18 1542      Row Name 04/10/18 1537             Mobility Assessment/Intervention     Extremity Weight-bearing Status left upper extremity;right upper extremity;left lower extremity;right lower extremity  -RF      Left Upper Extremity (Weight-bearing Status) non weight-bearing (NWB)  -RF      Right Upper Extremity (Weight-bearing Status) full weight-bearing (FWB)  -RF      Left Lower Extremity (Weight-bearing Status) weight-bearing as tolerated (WBAT)  -RF      Right Lower Extremity (Weight-bearing Status) non weight-bearing (NWB)  -RF      Recorded by [RF] Marta Mckeon, PTA 04/10/18 1542 04/10/18 1542      Row Name 04/10/18 1537             Bed Mobility Assessment/Treatment    Bed Mobility Assessment/Treatment rolling right;scooting/bridging;supine-sit;sit-supine  -RF      Rolling Right [place] (Bed Mobility) verbal cues;nonverbal cues (demo/gesture);maximum assist (25% patient effort);2 person assist  -RF      Scooting/Bridging [place] (Bed Mobility) verbal cues;nonverbal cues (demo/gesture);maximum assist (25% patient effort);2 person assist  -RF      Supine-Sit [place] (Bed Mobility) verbal cues;nonverbal cues (demo/gesture);maximum assist (25% patient effort);2 person assist  -RF      Sit-Supine [place] (Bed Mobility) verbal cues;nonverbal cues (demo/gesture);maximum assist (25% patient effort);2 person assist  -RF      Bed Mobility, Safety Issues cognitive deficits limit understanding;decreased use of arms for pushing/pulling;decreased use of legs for bridging/pushing;impaired trunk control for bed mobility  -RF      Recorded by [RF] Marta Mckeon, CHIOMA 04/10/18 1542 04/10/18 1542      Row Name 04/10/18 1537             Transfer Assessment/Treatment    Transfer Assessment/Treatment other (see comments);bed-chair transfer;chair-bed transfer  -RF      Maintains Weight-bearing Status (Transfers) able to maintain  -RF      Bed-Chair [place] (Transfers) maximum assist (25% patient effort);2 person assist;nonverbal cues (demo/gesture);verbal cues  -RF      Chair-Bed  [place] (Transfers) maximum assist (25% patient effort);2 person assist;nonverbal cues (demo/gesture);verbal cues  -RF      Assistive Device (Bed-Chair Transfers) --   stand-pivot  -RF      Recorded by [RF] Marta Mckeon, PTA 04/10/18 1542 04/10/18 1542      Row Name 04/10/18 1537             Chair-Bed Transfer    Assistive Device (Chair-Bed Transfers) --   stand-pivot  -RF      Recorded by [RF] Marta Mckeon, PTA 04/10/18 1542 04/10/18 1542      Row Name 04/10/18 1537             Gait/Stairs Assessment/Training    [place] Level (Gait) unable to assess  -RF      Recorded by [RF] Marta Mckeon, [facility] 04/10/18 1542 04/10/18 1542      Row Name 04/10/18 1537             Lower Extremity Seated Therapeutic Exercise    Performed, Seated Lower Extremity (Therapeutic Exercise) --   GS, AP, QS  -RF      Exercise Type, Seated Lower Extremity (Therapeutic Exercise) AROM (active range of motion)  -RF      Expected Outcomes, Seated Lower Extremity (Therapeutic Exercise) other (see comments);improve functional tolerance, community activity;improve functional tolerance, self-care activity;improve functional tolerance, household activity;improve performance, gait skills   strengthening  -RF      Recorded by [RF] Marta Mckeon, [facility] 04/10/18 1542 04/10/18 1542      Row Name 04/10/18 1537             Positioning and Restraints    Pre-Treatment Position in bed  -RF      Post Treatment Position chair  -RF      In Bed supine;call light within reach;encouraged to call for assist;exit alarm on;with family/caregiver   PM  -RF      In Chair reclined;call light within reach;encouraged to call for assist;exit alarm on;with family/caregiver   AM  -RF      Recorded by [RF] Marta Mckeon, PTA 04/10/18 1542 04/10/18 1542      Row Name 04/10/18 1537             Pain Scale: Numbers Pre/Post-Treatment    Pain Location - Side Bilateral  -RF      Pain Location - Orientation other (see comments)   L UE> R LE pain  -RF      Recorded  by [RF] Marta Mckeon, PTA 04/10/18 1542 04/10/18 1542      Row Name 04/10/18 1537             Pain Scale: FACES Pre/Post-Treatment    Pain: FACES Scale, Pretreatment 4-->hurts little more  -RF      Pain: FACES Scale, Post-Treatment 6-->hurts even more  -RF      Recorded by [RF] Marta Mckeon, PTA 04/10/18 1542 04/10/18 1542      Row Name 04/10/18 1537             Sensory Assessment/Intervention    Sensory General Assessment other (see comments)  -RF      Recorded by [RF] Marta Mckeon, PTA 04/10/18 1542 04/10/18 1542      Row Name 04/10/18 1537             Vision Assessment/Intervention    Visual Impairment/Limitations WFL  -RF      Recorded by [RF] Marta Mckeon, PTA 04/10/18 1542 04/10/18 1542      Row Name                Wound 04/08/18 1047 Left arm incision    Wound - Properties Group Date first assessed: 04/08/18 [LT] Time first assessed: 1047 [LT] Side: Left [LT] Location: arm [LT] Type: incision [LT] Recorded by:  [LT] Trisha Allan RN 04/08/18 1047 04/08/18 1047    Row Name 04/10/18 1537             Coping    Observed Emotional State accepting  -RF      Recorded by [RF] Marta Mckeon, PTA 04/10/18 1542 04/10/18 1542      Row Name 04/10/18 1537             Outcome Summary/Treatment Plan (PT)    Anticipated Equipment Needs at Discharge (PT) wheelchair  -RF      Anticipated Discharge Disposition (PT) inpatient rehab facility;skilled nursing facility (SNF);still a patient  -RF      Recorded by [RF] Marta Mckeon, PTA 04/10/18 1542 04/10/18 1542        User Key  (r) = Recorded By, (t) = Taken By, (c) = Cosigned By    Initials Name Effective Dates Discipline    LT Trisha Allan RN 06/16/16 -  Nurse    RF Marta Mckeon, PTA 03/07/18 -  PT          Wound 04/08/18 1047 Left arm incision (Active)   Dressing Appearance dry;intact 4/10/2018  7:05 AM   Closure [place] 4/10/2018  7:05 AM             Physical Therapy Education     Title: PT OT SLP Therapies (Done)     Topic: Physical  Therapy (Done)     Point: Mobility training (Done)    Learning Progress Summary     Learner Status Readiness Method Response Comment Documented by    Patient Done Acceptance E VU   04/10/18 1543     Done Acceptance E,D VU,NR   04/09/18 1841    Family Done Acceptance E,D VU,NR  AG 04/09/18 1841          Point: Home exercise program (Done)    Learning Progress Summary     Learner Status Readiness Method Response Comment Documented by    Patient Done Acceptance E VU   04/10/18 1543     Done Acceptance E,D VU,NR  AG 04/09/18 1841    Family Done Acceptance E,D VU,NR  AG 04/09/18 1841          Point: Body mechanics (Done)    Learning Progress Summary     Learner Status Readiness Method Response Comment Documented by    Patient Done Acceptance E VU   04/10/18 1543     Done Acceptance E,D VU,NR   04/09/18 1841    Family Done Acceptance E,D VU,NR   04/09/18 1841          Point: Precautions (Done)    Learning Progress Summary     Learner Status Readiness Method Response Comment Documented by    Patient Done Acceptance E VU   04/10/18 1543     Done Acceptance E,D VU,NR   04/09/18 1841    Family Done Acceptance E,D VU,NR   04/09/18 1841                      User Key     Initials Effective Dates Name Provider Type Discipline     04/03/18 -  Nga Valera, PT Physical Therapist PT     03/07/18 -  Marta Mckeon PTA Physical Therapy Assistant PT                    PT Recommendation and Plan  Anticipated Discharge Disposition (PT): inpatient rehab facility, skilled nursing facility (SNF), still a patient  Therapy Frequency (PT Clinical Impression): 5 times/wk (1-2 times/ day per priority policy)  Outcome Summary/Treatment Plan (PT)  Anticipated Equipment Needs at Discharge (PT): wheelchair  Anticipated Discharge Disposition (PT): inpatient rehab facility, skilled nursing facility (SNF), still a patient  Plan of Care Reviewed With: patient  Outcome Summary: Patient conitnues to require maximum assistance for  all functional mobility and transferring. Conitnue current POC to tolerance for further improvements and strengtheing.           Outcome Measures     Row Name 04/10/18 1523 04/10/18 1500 04/09/18 1800       How much help from another person do you currently need...    Turning from your back to your side while in flat bed without using bedrails?  --  -- 2  -AG    Moving from lying on back to sitting on the side of a flat bed without bedrails?  --  -- 2  -AG    Moving to and from a bed to a chair (including a wheelchair)?  --  -- 2  -AG    Standing up from a chair using your arms (e.g., wheelchair, bedside chair)?  --  -- 1  -AG    Climbing 3-5 steps with a railing?  --  -- 1  -AG    To walk in hospital room?  --  -- 1  -AG    AM-PAC 6 Clicks Score  --  -- 9  -AG       How much help from another is currently needed...    Putting on and taking off regular lower body clothing? 1  -KR  --  --    Bathing (including washing, rinsing, and drying) 1  -KR  --  --    Toileting (which includes using toilet bed pan or urinal) 1  -KR  --  --    Putting on and taking off regular upper body clothing 1  -KR  --  --    Taking care of personal grooming (such as brushing teeth) 2  -KR  --  --    Eating meals 2  -KR  --  --    Score 8  -KR  --  --       Functional Assessment    Outcome Measure Options  -- AM-PAC 6 Clicks Daily Activity (OT)  -KR AM-PAC 6 Clicks Basic Mobility (PT)  -AG      User Key  (r) = Recorded By, (t) = Taken By, (c) = Cosigned By    Initials Name Provider Type    AG Nga Valera, PT Physical Therapist    YONIS Ignacio, OT Occupational Therapist           Time Calculation:         PT Charges     Row Name 04/10/18 1545 04/10/18 1544          Time Calculation    PT Received On 04/10/18  -RF 04/10/18  -RF     PT - Next Appointment 04/11/18  -RF 04/10/18  -RF     PT Goal Re-Cert Due Date 04/23/18  -RF 04/23/18  -RF        Time Calculation- PT    TCU Minutes- PT 24 min  -RF 24 min  -RF       User Key  (r) = Recorded  By, (t) = Taken By, (c) = Cosigned By    Initials Name Provider Type    RF Marta Mckeon PTA Physical Therapy Assistant          Therapy Charges for Today     Code Description Service Date Service Provider Modifiers Qty    47093235476 HC PT THERAPEUTIC ACT EA 15 MIN 4/10/2018 Marta Mckeon PTA GP 4    [id number]  PT THER SUPP EA 15 MIN 4/10/2018 Marta Mckeon PTA GP 4          PT G-Codes  Outcome Measure Options: AM-PAC 6 Clicks Daily Activity (OT)  Score: 9  Functional Limitation: Mobility: Walking and moving around  Mobility: Walking and Moving Around Current Status (): At least 60 percent but less than 80 percent impaired, limited or restricted  Mobility: Walking and Moving Around Goal Status (): At least 20 percent but less than 40 percent impaired, limited or restricted    Marta Mckeon PTA  4/10/2018         Electronically signed by Marta Mckeon PTA at 4/10/2018  3:46 PM

## 2018-04-11 NOTE — PLAN OF CARE
Problem: Patient Care Overview  Goal: Plan of Care Review  Outcome: Ongoing (interventions implemented as appropriate)   04/11/18 1519   Coping/Psychosocial   Plan of Care Reviewed With patient   Coping/Psychosocial   Patient Agreement with Plan of Care agrees   Plan of Care Review   Progress improving   OTHER   Outcome Summary OT to continue POC.

## 2018-04-11 NOTE — PLAN OF CARE
Problem: Patient Care Overview  Goal: Plan of Care Review   04/11/18 3928   Coping/Psychosocial   Plan of Care Reviewed With patient;son   Coping/Psychosocial   Patient Agreement with Plan of Care agrees   Plan of Care Review   Progress improving   OTHER   Outcome Summary Patient continues to require skilled care and education per POc to tolerance for further improved LE strength and functional mobility.

## 2018-04-12 LAB
ANION GAP SERPL CALCULATED.3IONS-SCNC: 3.5 MMOL/L (ref 3.6–11.2)
BASOPHILS # BLD AUTO: 0.02 10*3/MM3 (ref 0–0.3)
BASOPHILS NFR BLD AUTO: 0.2 % (ref 0–2)
BUN BLD-MCNC: 19 MG/DL (ref 7–21)
BUN/CREAT SERPL: 37.3 (ref 7–25)
CALCIUM SPEC-SCNC: 9.3 MG/DL (ref 7.7–10)
CHLORIDE SERPL-SCNC: 102 MMOL/L (ref 99–112)
CO2 SERPL-SCNC: 31.5 MMOL/L (ref 24.3–31.9)
CREAT BLD-MCNC: 0.51 MG/DL (ref 0.43–1.29)
DEPRECATED RDW RBC AUTO: 46.3 FL (ref 37–54)
EOSINOPHIL # BLD AUTO: 0.33 10*3/MM3 (ref 0–0.7)
EOSINOPHIL NFR BLD AUTO: 4 % (ref 0–7)
ERYTHROCYTE [DISTWIDTH] IN BLOOD BY AUTOMATED COUNT: 13.7 % (ref 11.5–14.5)
GFR SERPL CREATININE-BSD FRML MDRD: 119 ML/MIN/1.73
GLUCOSE BLD-MCNC: 102 MG/DL (ref 70–110)
HCT VFR BLD AUTO: 28.2 % (ref 37–47)
HGB BLD-MCNC: 8.8 G/DL (ref 12–16)
IMM GRANULOCYTES # BLD: 0.01 10*3/MM3 (ref 0–0.03)
IMM GRANULOCYTES NFR BLD: 0.1 % (ref 0–0.5)
LYMPHOCYTES # BLD AUTO: 2.13 10*3/MM3 (ref 1–3)
LYMPHOCYTES NFR BLD AUTO: 25.7 % (ref 16–46)
MCH RBC QN AUTO: 30.8 PG (ref 27–33)
MCHC RBC AUTO-ENTMCNC: 31.2 G/DL (ref 33–37)
MCV RBC AUTO: 98.6 FL (ref 80–94)
MONOCYTES # BLD AUTO: 0.79 10*3/MM3 (ref 0.1–0.9)
MONOCYTES NFR BLD AUTO: 9.5 % (ref 0–12)
NEUTROPHILS # BLD AUTO: 5 10*3/MM3 (ref 1.4–6.5)
NEUTROPHILS NFR BLD AUTO: 60.5 % (ref 40–75)
OSMOLALITY SERPL CALC.SUM OF ELEC: 276.3 MOSM/KG (ref 273–305)
PLATELET # BLD AUTO: 488 10*3/MM3 (ref 130–400)
PMV BLD AUTO: 9.9 FL (ref 6–10)
POTASSIUM BLD-SCNC: 4.4 MMOL/L (ref 3.5–5.3)
RBC # BLD AUTO: 2.86 10*6/MM3 (ref 4.2–5.4)
SODIUM BLD-SCNC: 137 MMOL/L (ref 135–153)
WBC NRBC COR # BLD: 8.28 10*3/MM3 (ref 4.5–12.5)

## 2018-04-12 PROCEDURE — 94799 UNLISTED PULMONARY SVC/PX: CPT

## 2018-04-12 PROCEDURE — 80048 BASIC METABOLIC PNL TOTAL CA: CPT | Performed by: INTERNAL MEDICINE

## 2018-04-12 PROCEDURE — 99232 SBSQ HOSP IP/OBS MODERATE 35: CPT | Performed by: PHYSICIAN ASSISTANT

## 2018-04-12 PROCEDURE — 97530 THERAPEUTIC ACTIVITIES: CPT

## 2018-04-12 PROCEDURE — 25010000002 ENOXAPARIN PER 10 MG: Performed by: ORTHOPAEDIC SURGERY

## 2018-04-12 PROCEDURE — 25010000002 ONDANSETRON PER 1 MG: Performed by: HOSPITALIST

## 2018-04-12 PROCEDURE — 85025 COMPLETE CBC W/AUTO DIFF WBC: CPT | Performed by: INTERNAL MEDICINE

## 2018-04-12 PROCEDURE — 97110 THERAPEUTIC EXERCISES: CPT

## 2018-04-12 RX ORDER — DOCUSATE SODIUM 100 MG/1
200 CAPSULE, LIQUID FILLED ORAL 2 TIMES DAILY
Status: DISCONTINUED | OUTPATIENT
Start: 2018-04-12 | End: 2018-04-13

## 2018-04-12 RX ADMIN — CARBIDOPA AND LEVODOPA 1 TABLET: 25; 100 TABLET ORAL at 20:01

## 2018-04-12 RX ADMIN — Medication 1 TABLET: at 08:18

## 2018-04-12 RX ADMIN — POLYETHYLENE GLYCOL (3350) 17 G: 17 POWDER, FOR SOLUTION ORAL at 15:00

## 2018-04-12 RX ADMIN — FERROUS SULFATE TAB 325 MG (65 MG ELEMENTAL FE) 325 MG: 325 (65 FE) TAB at 08:18

## 2018-04-12 RX ADMIN — ENOXAPARIN SODIUM 40 MG: 40 INJECTION SUBCUTANEOUS at 08:18

## 2018-04-12 RX ADMIN — DOCUSATE SODIUM 200 MG: 100 CAPSULE, LIQUID FILLED ORAL at 20:01

## 2018-04-12 RX ADMIN — CARBIDOPA AND LEVODOPA 1 TABLET: 25; 100 TABLET ORAL at 15:00

## 2018-04-12 RX ADMIN — Medication 1 CAPSULE: at 08:18

## 2018-04-12 RX ADMIN — HYDROCODONE BITARTRATE AND ACETAMINOPHEN 1 TABLET: 5; 325 TABLET ORAL at 09:50

## 2018-04-12 RX ADMIN — HYDROCODONE BITARTRATE AND ACETAMINOPHEN 1 TABLET: 5; 325 TABLET ORAL at 17:17

## 2018-04-12 RX ADMIN — Medication 5 ML: at 20:02

## 2018-04-12 RX ADMIN — HYDROCODONE BITARTRATE AND ACETAMINOPHEN 1 TABLET: 5; 325 TABLET ORAL at 01:21

## 2018-04-12 RX ADMIN — DOXYCYCLINE 100 MG: 100 CAPSULE ORAL at 20:01

## 2018-04-12 RX ADMIN — ONDANSETRON 4 MG: 2 INJECTION INTRAMUSCULAR; INTRAVENOUS at 12:23

## 2018-04-12 RX ADMIN — HYDROCODONE BITARTRATE AND ACETAMINOPHEN 1 TABLET: 5; 325 TABLET ORAL at 06:01

## 2018-04-12 RX ADMIN — DOXYCYCLINE 100 MG: 100 CAPSULE ORAL at 08:18

## 2018-04-12 RX ADMIN — DOCUSATE SODIUM 100 MG: 100 CAPSULE, LIQUID FILLED ORAL at 08:18

## 2018-04-12 RX ADMIN — CARBIDOPA AND LEVODOPA 1 TABLET: 25; 100 TABLET ORAL at 06:01

## 2018-04-12 RX ADMIN — Medication 5 ML: at 08:19

## 2018-04-12 NOTE — PLAN OF CARE
Problem: Fall Risk (Adult)  Goal: Absence of Fall  Outcome: Ongoing (interventions implemented as appropriate)      Problem: Pain, Acute (Adult)  Goal: Identify Related Risk Factors and Signs and Symptoms  Outcome: Ongoing (interventions implemented as appropriate)    Goal: Acceptable Pain Control/Comfort Level  Outcome: Ongoing (interventions implemented as appropriate)      Problem: Fracture Orthopaedic (Adult)  Goal: Signs and Symptoms of Listed Potential Problems Will be Absent, Minimized or Managed (Fracture Orthopaedic)  Outcome: Ongoing (interventions implemented as appropriate)      Problem: Skin Injury Risk (Adult)  Goal: Identify Related Risk Factors and Signs and Symptoms  Outcome: Ongoing (interventions implemented as appropriate)

## 2018-04-12 NOTE — THERAPY TREATMENT NOTE
Acute Care - Physical Therapy Treatment Note   [place]     Patient Name: Debra Resendiz  : 1945  MRN: [id number]  Today's Date: 2018  Onset of Illness/Injury or Date of Surgery: 18  Date of Referral to PT: 18  Referring Physician: Dr. Aburto    Admit Date: 2018    Visit Dx:    ICD-10-CM ICD-9-CM   1. Closed displaced comminuted fracture of shaft of left humerus, initial encounter S42.352A 812.21   2. Right medial tibial plateau fracture, closed, initial encounter S82.131A 823.00     Patient Active Problem List   Diagnosis   • Closed displaced comminuted fracture of shaft of left humerus       Therapy Treatment          Rehabilitation Treatment Summary     Row Name 18 1753 18 1358          Treatment Time/Intention    Discipline physical therapist  -AG occupational therapist  -KR     Document Type therapy note (daily note)   treated BID  -AG therapy note (daily note)  -KR     Subjective Information complains of;nausea/vomiting  -AG  --     Mode of Treatment individual therapy;physical therapy  -AG  --     Patient/Family Observations son present in room. Pt. supine during AM session; L UE sling, R LE KI in place.    -AG  --     Care Plan Review care plan/treatment goals reviewed;risks/benefits reviewed;patient/other agree to care plan  -AG  --     Care Plan Review, Other Participant(s) son  -AG  --     Therapy Frequency (PT Clinical Impression) --   1-2 times/ day per priority policy  -AG  --     Patient Effort fair  -AG good  -KR     Existing Precautions/Restrictions brace worn when out of bed;fall;non-weight bearing;oxygen therapy device and L/min  -AG  --     Patient Response to Treatment pt. reports feeling nauseated today however, is agreeable to participate.  Pt. continues to dependent transfer to bedside chair; educated patient on importance of adherance to HEP, mobilization of B LE to minimize risk of blood clot.    -AG  --     Recorded by [AG] Nga Valera, PT 18  1806 04/12/18 1806 [KR] Viraj RAMÍREZ Mateus, OT 04/12/18 1402 04/12/18 1402     Row Name 04/12/18 1753             Cognitive Assessment/Intervention- PT/OT    Orientation Status (Cognition) oriented x 3  -AG      Follows Commands (Cognition) does not follow one step commands;delayed response/completion;increased processing time needed;physical/tactile prompts required  -AG      Safety Deficit (Cognitive) safety precautions awareness;safety precautions follow-through/compliance  -AG      Recorded by [AG] Nga Valera, PT 04/12/18 1806 04/12/18 1806      Row Name 04/12/18 1753             Safety Issues, Functional Mobility    Safety Issues Affecting Function (Mobility) safety precaution awareness;safety precautions follow-through/compliance  -AG      Recorded by [AG] Nga Valera, PT 04/12/18 1806 04/12/18 1806      Row Name 04/12/18 1753             Mobility Assessment/Intervention    Extremity Weight-bearing Status left upper extremity;right lower extremity  -AG      Left Upper Extremity (Weight-bearing Status) non weight-bearing (NWB)  -AG      Right Lower Extremity (Weight-bearing Status) non weight-bearing (NWB)  -AG      Recorded by [AG] Nga Valera, PT 04/12/18 1806 04/12/18 1806      Row Name 04/12/18 1753             Bed Mobility Assessment/Treatment    Bed Mobility Assessment/Treatment scooting/bridging;supine-sit;sit-supine  -AG      Scooting/Bridging [place] (Bed Mobility) verbal cues;nonverbal cues (demo/gesture);maximum assist (25% patient effort);2 person assist  -AG      Supine-Sit [place] (Bed Mobility) verbal cues;nonverbal cues (demo/gesture);maximum assist (25% patient effort);2 person assist  -AG      Sit-Supine [place] (Bed Mobility) verbal cues;nonverbal cues (demo/gesture);maximum assist (25% patient effort);2 person assist  -AG      Bed Mobility, Safety Issues cognitive deficits limit understanding;decreased use of arms for pushing/pulling;decreased use of legs for  bridging/pushing;impaired trunk control for bed mobility  -AG      Assistive Device (Bed Mobility) draw sheet  -AG      Recorded by [AG] Nga Valera, PT 04/12/18 1806 04/12/18 1806      Row Name 04/12/18 1753             Transfer Assessment/Treatment    Transfer Assessment/Treatment bed-chair transfer;chair-bed transfer;stand pivot/stand step transfer  -AG      Maintains Weight-bearing Status (Transfers) able to maintain  -AG      Bed-Chair [place] (Transfers) verbal cues;nonverbal cues (demo/gesture);maximum assist (25% patient effort);2 person assist  -AG      Chair-Bed [place] (Transfers) verbal cues;nonverbal cues (demo/gesture);maximum assist (25% patient effort);2 person assist  -AG      Recorded by [AG] Nga Valera, PT 04/12/18 1806 04/12/18 1806      Row Name 04/12/18 1753             Stand Pivot/Stand Step Transfer    Stand Pivot/Stand Step [place] verbal cues;nonverbal cues (demo/gesture);maximum assist (25% patient effort);2 person assist  -AG      Recorded by [AG] Nga Valera, PT 04/12/18 1806 04/12/18 1806      Row Name 04/12/18 1753             Gait/Stairs Assessment/Training    [place] Level (Gait) unable to assess  -AG      Recorded by [AG] Nga Valera, PT 04/12/18 1806 04/12/18 1806      Row Name 04/12/18 1753 04/12/18 1358          Therapeutic Exercise    Therapeutic Exercise seated, lower extremities;supine, lower extremities  -AG supine, upper extremities  -KR     Additional Documentation Therapeutic Exercise (Row)  -AG  --     Recorded by [AG] Nga Valera, PT 04/12/18 1806 04/12/18 1806 [KR] Viraj Ignacio, OT 04/12/18 1402 04/12/18 1402     Row Name 04/12/18 1358             Upper Extremity Supine Therapeutic Exercise    Performed, Supine Upper Extremity (Therapeutic Exercise) elbow flexion/extension  -KR      Comment, Supine Upper Extremity (Therapeutic Exercise) hand  ther ex  -KR      Recorded by [KR] Viraj Ignacio, OT 04/12/18 1402 04/12/18 1402      Row Name  04/12/18 1753             Lower Extremity Seated Therapeutic Exercise    Performed, Seated Lower Extremity (Therapeutic Exercise) ankle dorsiflexion/plantarflexion;LAQ (long arc quad), knee extension  -AG      Exercise Type, Seated Lower Extremity (Therapeutic Exercise) AAROM (active assistive range of motion)  -AG      Expected Outcomes, Seated Lower Extremity (Therapeutic Exercise) improve functional tolerance, household activity;improve performance, transfer skills;strengthen, facilitate independent active range of motion  -AG      Sets/Reps Detail, Seated Lower Extremity (Therapeutic Exercise) 10 x 2  -AG      Recorded by [AG] Nga Valera, PT 04/12/18 1806 04/12/18 1806      Row Name 04/12/18 1753             Lower Extremity Supine Therapeutic Exercise    Performed, Supine Lower Extremity (Therapeutic Exercise) hip abduction/adduction;ankle dorsiflexion/plantarflexion;gluteal sets;ankle pumps;heel slides  -AG      Exercise Type, Supine Lower Extremity (Therapeutic Exercise) AAROM (active assistive range of motion)  -AG      Expected Outcomes, Supine Lower Extremity (Therapeutic Exercise) improve functional tolerance, household activity;improve performance, transfer skills;strengthen, facilitate independent active range of motion  -AG      Sets/Reps Detail, Supine Lower Extremity (Therapeutic Exercise) 10 x 2  -AG      Recorded by [AG] Nga Valera, PT 04/12/18 1806 04/12/18 1806      Row Name 04/12/18 1753             Static Sitting Balance    Level of [place] (Unsupported Sitting, Static Balance) contact guard assist  -AG      Sitting Position (Unsupported Sitting, Static Balance) sitting on edge of bed  -AG      Time Able to Maintain Position (Unsupported Sitting, Static Balance) more than 5 minutes  -AG      Recorded by [AG] Nga Valera, PT 04/12/18 1806 04/12/18 1806      Row Name 04/12/18 1753             Positioning and Restraints    Pre-Treatment Position in bed  -AG      Post Treatment Position  chair  -AG      In Bed supine;call light within reach;encouraged to call for assist;with family/caregiver;side rails up x3;heels elevated   following second treatment session  -AG      In Chair notified nsg;sitting;call light within reach;encouraged to call for assist;with family/caregiver;RLE elevated   following first treatment session  -AG      Recorded by [AG] Nga Valera, PT 04/12/18 1806 04/12/18 1806      Row Name                Wound 04/08/18 1047 Left arm incision    Wound - Properties Group Date first assessed: 04/08/18 [LT] Time first assessed: 1047 [LT] Side: Left [LT] Location: arm [LT] Type: incision [LT] Recorded by:  [LT] Trisha Allan RN 04/08/18 1047 04/08/18 1047    Row Name 04/12/18 1753             Plan of Care Review    Plan of Care Reviewed With patient;son  -AG      Recorded by [AG] Nga Valera, PT 04/12/18 1806 04/12/18 1806      Row Name 04/12/18 1753             Outcome Summary/Treatment Plan (PT)    Daily Summary of Progress (PT) progress toward functional goals is gradual  -AG      Anticipated Equipment Needs at Discharge (PT) wheelchair  -AG      Anticipated Discharge Disposition (PT) inpatient rehab facility;skilled nursing facility (SNF);still a patient  -AG      Recorded by [AG] Nga Valera, PT 04/12/18 1806 04/12/18 1806        User Key  (r) = Recorded By, (t) = Taken By, (c) = Cosigned By    Initials Name Effective Dates Discipline    AG Nga Valera, PT 04/03/18 -  PT    YONIS Ignacio, OT 04/03/18 -  OT    LT Trisha Allan, TAMEKA 06/16/16 -  Nurse          Wound 04/08/18 1047 Left arm incision (Active)   Dressing Appearance dry;intact 4/12/2018  9:50 AM             Physical Therapy Education     Title: PT OT SLP Therapies (Done)     Topic: Physical Therapy (Done)     Point: Mobility training (Done)    Learning Progress Summary     Learner Status Readiness Method Response Comment Documented by    Patient Done Acceptance INGRID WHITNEY,NR  AG 04/12/18 1806     Done  Acceptance E VU  RF 04/11/18 1652     Done Acceptance E VU  AM 04/11/18 1611     Done Acceptance E VU  RF 04/10/18 1543     Done Acceptance E,D VU,NR  AG 04/09/18 1841    Family Done Acceptance E,D VU,NR  AG 04/12/18 1806     Done Acceptance E,D VU,NR  AG 04/09/18 1841          Point: Home exercise program (Done)    Learning Progress Summary     Learner Status Readiness Method Response Comment Documented by    Patient Done Acceptance E,D VU,NR  AG 04/12/18 1806     Done Acceptance E VU  RF 04/11/18 1652     Done Acceptance E VU  AM 04/11/18 1611     Done Acceptance E VU  RF 04/10/18 1543     Done Acceptance E,D VU,NR  AG 04/09/18 1841    Family Done Acceptance E,D VU,NR  AG 04/12/18 1806     Done Acceptance E,D VU,NR  AG 04/09/18 1841          Point: Body mechanics (Done)    Learning Progress Summary     Learner Status Readiness Method Response Comment Documented by    Patient Done Acceptance E,D VU,NR  AG 04/12/18 1806     Done Acceptance E VU  RF 04/11/18 1652     Done Acceptance E VU  AM 04/11/18 1611     Done Acceptance E VU  RF 04/10/18 1543     Done Acceptance E,D VU,NR  AG 04/09/18 1841    Family Done Acceptance E,D VU,NR  AG 04/12/18 1806     Done Acceptance E,D VU,NR  AG 04/09/18 1841          Point: Precautions (Done)    Learning Progress Summary     Learner Status Readiness Method Response Comment Documented by    Patient Done Acceptance E,D VU,NR  AG 04/12/18 1806     Done Acceptance E VU  RF 04/11/18 1652     Done Acceptance E VU  AM 04/11/18 1611     Done Acceptance E VU  RF 04/10/18 1543     Done Acceptance E,D VU,NR  AG 04/09/18 1841    Family Done Acceptance E,D VU,NR  AG 04/12/18 1806     Done Acceptance E,D VU,NR  AG 04/09/18 1841                      User Key     Initials Effective Dates Name Provider Type Discipline    AM 06/16/16 -  Smitha Glass, RN Registered Nurse Nurse     04/03/18 -  Nga Valera, PT Physical Therapist PT     03/07/18 -  Marta Mckeon, PTA Physical Therapy  Assistant PT                    PT Recommendation and Plan  Anticipated Discharge Disposition (PT): inpatient rehab facility, skilled nursing facility (SNF), still a patient  Planned Therapy Interventions (PT Eval): balance training, bed mobility training, home exercise program, manual therapy techniques, neuromuscular re-education, patient/family education, postural re-education, ROM (range of motion), strengthening, transfer training  Therapy Frequency (PT Clinical Impression):  (1-2 times/ day per priority policy)  Outcome Summary/Treatment Plan (PT)  Daily Summary of Progress (PT): progress toward functional goals is gradual  Anticipated Equipment Needs at Discharge (PT): wheelchair  Anticipated Discharge Disposition (PT): inpatient rehab facility, skilled nursing facility (SNF), still a patient  Plan of Care Reviewed With: patient, divina  Progress: improving  Outcome Summary: continue POC.          Outcome Measures     Row Name 04/10/18 1523 04/10/18 1500          How much help from another is currently needed...    Putting on and taking off regular lower body clothing? 1  -KR  --     Bathing (including washing, rinsing, and drying) 1  -KR  --     Toileting (which includes using toilet bed pan or urinal) 1  -KR  --     Putting on and taking off regular upper body clothing 1  -KR  --     Taking care of personal grooming (such as brushing teeth) 2  -KR  --     Eating meals 2  -KR  --     Score 8  -KR  --        Functional Assessment    Outcome Measure Options  -- AM-PAC 6 Clicks Daily Activity (OT)  -KR       User Key  (r) = Recorded By, (t) = Taken By, (c) = Cosigned By    Initials Name Provider Type    YONIS Ignacio OT Occupational Therapist           Time Calculation:         PT Charges     Row Name 04/12/18 1806             Time Calculation    PT Received On 04/12/18  -      PT - Next Appointment 04/13/18  -      PT Goal Re-Cert Due Date 04/23/18  -         Time Calculation- PT    TCU Minutes- PT 55  min  -AG        User Key  (r) = Recorded By, (t) = Taken By, (c) = Cosigned By    Initials Name Provider Type    AG Nga Valera, PT Physical Therapist          Therapy Charges for Today     Code Description Service Date Service Provider Modifiers Qty    15723947699 HC PT THER SUPP EA 15 MIN 4/12/2018 Nga Valera, PT GP 3    08885146330 HC PT THERAPEUTIC ACT EA 15 MIN 4/12/2018 Nga Valera, PT GP 2    21516108916 HC PT THER PROC EA 15 MIN 4/12/2018 Nga Valera, PT GP 2          PT G-Codes  Outcome Measure Options: AM-PAC 6 Clicks Daily Activity (OT)  Score: 9  Functional Limitation: Mobility: Walking and moving around  Mobility: Walking and Moving Around Current Status (): At least 60 percent but less than 80 percent impaired, limited or restricted  Mobility: Walking and Moving Around Goal Status (): At least 20 percent but less than 40 percent impaired, limited or restricted    Nga Valera PT  4/12/2018

## 2018-04-12 NOTE — DISCHARGE PLACEMENT REQUEST
"Debra Resendiz (72 y.o. Female)     Date of Birth Social Security Number Address Home Phone MRN    1945  [address]  [facility] [address] [phone] [id number]    Alevism Marital Status          None        Admission Date Admission Type Admitting Provider Attending Provider Department, Room/Bed    18 Emergency Cornel Andrade MD Perkins, Jimmye S, MD [address], [address]/    Discharge Date Discharge Disposition Discharge Destination                       Attending Provider:  Ngozi Milligan MD    Allergies:  Penicillins    Isolation:  None   Infection:  None   Code Status:  FULL    Ht:  167.6 cm (66\")   Wt:  56.2 kg (124 lb)    Admission Cmt:  None   Principal Problem:  None                Active Insurance as of 2018     Primary Coverage     Payor Plan Insurance Group Employer/Plan Group    MEDICARE MEDICARE A & B      Payor Plan Address Payor Plan Phone Number Effective From Effective To    [address] [phone] [address]     [place] [address]       Subscriber Name Subscriber Birth Date Member ID       DEBRA RESENDIZ 1945 [id number]                 Emergency Contacts      (Rel.) Home Phone Work Phone Mobile Phone    Diaz Resendiz (Son) [phone] -- --            Emergency Contact Information     Name Relation Home Work Mobile    Diaz Resendiz Son [phone]            Insurance Information                MEDICARE/MEDICARE A & B Phone: [phone]    Subscriber: Debra Resendiz Subscriber#: [id number]    Group#:  Precert#:           Problem List           Codes Noted - Resolved       Hospital    Closed displaced comminuted fracture of shaft of left humerus ICD-10-CM: S42.352A  ICD-9-CM: 812.21 2018 - Present             History & Physical      Cornel Andrade MD at 2018 10:43 PM          Hospitalist History and Physical        Patient Identification  Name: Debra Resendiz  Age/Sex: 72 y.o. female  :  1945        MRN: " [id number]  Visit Number: [id number]  PCP: No Known Provider        Chief complaint left shoulder, right knee pain after falling    History of Present Illness:  Patient is a 72 y.o. female who presents with pain in her left shoulder and right knee after falling at home. She states she was walking down a steep embankment outside her house when her feet slipped out from under her and she toppled down several feet and landed on gravel. She had immediate pain in her left shoulder and right knee. She was unable to get up but fortunately a relative who was nearby came to check on her and found her on the ground after a few minutes. She was brought to our ED and found to have a left humeral neck fracture and a right medial tibial plateau fracture. Her basic labs showed leukocytosis which is felt to be reactive from the fall and fractures, as she has no signs/symptoms of infection at this time. Initial troponin was indeterminate and repeat continues to rise, without a corresponding elevated CPK to explain possible spillover. Patient denies any chest pain or shortness of breath. EKG does not show any evidence of acute ischemia. She denies history of coronary artery disease. She does state that coronary artery disease runs in her family, however. She has been admitted to the telemetry floor for further management of above-mentioned fractures as well as workup for indeterminate troponin elevation.     Review of Systems  Review of Systems   Constitutional: Negative for activity change, appetite change, chills, diaphoresis, fatigue, fever and unexpected weight change.   HENT: Negative for congestion, postnasal drip, rhinorrhea, sinus pressure and sore throat.    Eyes: Negative for photophobia, pain, discharge, redness, itching and visual disturbance.   Respiratory: Negative for cough, shortness of breath and wheezing.    Cardiovascular: Negative for chest pain, palpitations and leg swelling.   Gastrointestinal: Negative for  abdominal distention, abdominal pain, constipation, diarrhea, nausea and vomiting.   Endocrine: Negative for cold intolerance, heat intolerance, polydipsia, polyphagia and polyuria.   Genitourinary: Negative for difficulty urinating, dysuria, flank pain, frequency, hematuria and pelvic pain.   Musculoskeletal: Positive for arthralgias (left shoulder and right knee). Negative for back pain, joint swelling, myalgias, neck pain and neck stiffness.   Skin: Positive for wound (abasion left shoulder from fall). Negative for color change, pallor and rash.   Allergic/Immunologic: Negative for environmental allergies, food allergies and immunocompromised state.   Neurological: Positive for dizziness (says the morphine made her dizzy). Negative for tremors, syncope, weakness, light-headedness, numbness and headaches.   Hematological: Negative for adenopathy. Does not bruise/bleed easily.   Psychiatric/Behavioral: Negative for agitation, behavioral problems and confusion.       History  History reviewed. No pertinent past medical history.  Past Surgical History:   Procedure Laterality Date   • HIP SURGERY         Family History:  - Patient reports strong family history of coronary artery disease    Social History   Substance Use Topics   • Smoking status: Former Smoker   • Smokeless tobacco: Not on file   • Alcohol use No     Prescriptions Prior to Admission   Medication Sig Dispense Refill Last Dose   • diphenhydrAMINE (BENADRYL) 25 mg capsule Take 25 mg by mouth 2 (Two) Times a Day As Needed for Itching, Allergies or Sleep.   Past Month at Unknown time   • multivitamin (DAILY RED) tablet tablet Take 1 tablet by mouth Daily.   4/6/2018 at am     Allergies:  Penicillins    Objective     Vital Signs  Temp:  [97.1 °F (36.2 °C)-98.4 °F (36.9 °C)] 98.3 °F (36.8 °C)  Heart Rate:  [] 96  Resp:  [16-20] 20  BP: (137-153)/(64-70) 153/70  Body mass index is 18.96 kg/m².    Physical Exam:  Physical Exam   Constitutional: She is  oriented to person, place, and time. She appears well-developed and well-nourished.   No acute distress but appears uncomfortable   HENT:   Head: Normocephalic and atraumatic.   Mouth/Throat: Oropharynx is clear and moist.   Eyes: Conjunctivae and EOM are normal. Pupils are equal, round, and reactive to light.   Neck: Normal range of motion. Neck supple. No JVD present.   Cardiovascular:   No murmur heard.  Tachycardic, regular rhythm   Pulmonary/Chest: Effort normal and breath sounds normal. No respiratory distress. She has no wheezes. She exhibits no tenderness.   Abdominal: Soft. Bowel sounds are normal. She exhibits no distension. There is no tenderness.   Musculoskeletal: She exhibits deformity (some swelling noted around left shoulder). She exhibits no edema.   Left upper extremity in sling and right knee in immobilizer per ortho recommendations   Lymphadenopathy:     She has no cervical adenopathy.   Neurological: She is alert and oriented to person, place, and time.   No gross focal deficits   Skin: Skin is warm and dry. Capillary refill takes less than 2 seconds. No erythema.   Abrasion noted on left shoulder from fall   Psychiatric: She has a normal mood and affect. Her behavior is normal. Judgment and thought content normal.         Results Review:       Lab Results:    Results from last 7 days  Lab Units 04/06/18  1920   WBC 10*3/mm3 16.10*   HEMOGLOBIN g/dL 10.2*   PLATELETS 10*3/mm3 353           Results from last 7 days  Lab Units 04/06/18  1920   SODIUM mmol/L 137   POTASSIUM mmol/L 4.2   CHLORIDE mmol/L 107   CO2 mmol/L 25.8   BUN mg/dL 17   CREATININE mg/dL 0.64   CALCIUM mg/dL 9.3   GLUCOSE mg/dL 109         No results found for: HGBA1C    Results from last 7 days  Lab Units 04/06/18  1920   BILIRUBIN mg/dL 0.4   ALK PHOS U/L 86   AST (SGOT) U/L 27   ALT (SGPT) U/L 19       Results from last 7 days  Lab Units 04/06/18 2120 04/06/18  1920   CK TOTAL U/L 97 99   TROPONIN I ng/mL 0.101* 0.065*   CK  MB INDEX % 3.2*  --    MYOGLOBIN ng/mL 102.0  --            Results from last 7 days  Lab Units 04/06/18 1920   INR  1.04           I have reviewed the patient's laboratory results.    Imaging:  Imaging Results (last 72 hours)     Procedure Component Value Units Date/Time    XR Chest 1 View [[id number]] Updated:  04/06/18 2000    CT Upper Extremity Left Without Contrast [[id number]] Resulted:  04/06/18 1908     Updated:  04/06/18 1911    XR Shoulder 2+ View Left [[id number]] Collected:  04/06/18 1523     Updated:  04/06/18 1623    Narrative:       EXAMINATION: XR SHOULDER 2+ VW LEFT-      CLINICAL INDICATION:fall        COMPARISON: None      TECHNIQUE: 2 views left shoulder     FINDINGS:   Left humerus neck fracture. Angulation without significant displacement.            Impression:       Angulated but nondisplaced left humeral neck fracture.     This report was finalized on 4/6/2018 3:24 PM by Dr. Viraj Appiah MD.       XR Knee 3 View Right [[id number]] Collected:  04/06/18 1524     Updated:  04/06/18 1623    Narrative:       EXAMINATION: XR KNEE 3 VW RIGHT-      CLINICAL INDICATION:fall        COMPARISON: None      TECHNIQUE: 3 views right knee     FINDINGS:   Fat fluid level suprapatellar bursa indicates lipohemarthrosis. Medial  tibial plateau fracture not displaced. No dislocation.           Impression:       Nondisplaced medial tibial plateau fracture with associated  lipohemarthrosis.     This report was finalized on 4/6/2018 3:24 PM by Dr. Viraj Appiah MD.           Chest XR: no obvious infiltrate, pleural effusion or pulmonary edema appreciated.    I have personally reviewed the patient's radiologic imaging.        EKG: Sinus tachycardia, . QTc 438. Electronic interpretation mentions possible old septal infarct but I do not appreciate such an abnormality.  No overt ST changes to suggest acute ischemia.     I have personally reviewed the patient's EKG.        Assessment/Plan     Active Problems:  -  Traumatic left humeral neck fracture and right tibial plateau fracture: admitted to the telemetry floor. Left arm in sling and right knee in immobilizer per ortho recommendations. Orthopedic surgery will see in consultation tomorrow. Will make NPO after midnight. Due to rising troponins (see below), will need cardiac evaluation prior to surgery.  - Indeterminate troponin elevation: initially suspected this was probably due to CPK spillover from traumatic injuries above. However, CPK is normal and yet troponin continues to trend upward. No documented history of CAD but strong family history reported. No active anginal symptoms or acute findings on EKG. Will go ahead and give full dose ASA empirically at this time while continuing to trend cardiac enzymes. Risk stratify by checking hemoglobin A1c and fasting lipid panel in the morning. ECHO ordered for tomorrow. Will consult cardiology for perioperative evaluation prior to proceeding with surgery.   - Leukocytosis: felt to be reactive from above traumatic fractures. No signs/symptoms of infection at this time. Continue to monitor closely, repeat labs in the morning.      DVT Prophylaxis: SCD to left leg    Estimated Length of Stay >2 midnights    I discussed the patient's findings, assessment and plan with the patient and her RN April on [facility].    * Patient is high risk due to rising troponin elevation following traumatic left humeral neck and right tibial plateau fractures earlier today    Cornel Andrade MD  04/06/18  10:43 PM      Electronically signed by Cornel Andrade MD at 4/6/2018 11:08 PM       Hospital Medications (active)       Dose Frequency Start End    carbidopa-levodopa (SINEMET)  MG per tablet 1 tablet 1 tablet Every 8 Hours Scheduled 4/10/2018     Sig - Route: Take 1 tablet by mouth Every 8 (Eight) Hours. - Oral    docusate sodium (COLACE) capsule 100 mg 100 mg 2 Times Daily 4/11/2018     Sig - Route: Take 1 capsule by mouth 2 (Two)  "Times a Day. - Oral    doxycycline (MONODOX) capsule 100 mg 100 mg Every 12 Hours Scheduled 4/7/2018 4/14/2018    Sig - Route: Take 1 capsule by mouth Every 12 (Twelve) Hours. - Oral    enoxaparin (LOVENOX) syringe 40 mg 40 mg Daily 4/9/2018     Sig - Route: Inject 0.4 mL under the skin Daily. - Subcutaneous    ferrous sulfate tablet 325 mg 325 mg Daily With Breakfast 4/9/2018     Sig - Route: Take 1 tablet by mouth Daily With Breakfast. - Oral    HYDROcodone-acetaminophen (NORCO) 5-325 MG per tablet 1 tablet 1 tablet Every 4 Hours PRN 4/8/2018 4/18/2018    Sig - Route: Take 1 tablet by mouth Every 4 (Four) Hours As Needed for Moderate Pain . - Oral    lactobacillus acidophilus (RISAQUAD) capsule 1 capsule 1 capsule Daily 4/11/2018     Sig - Route: Take 1 capsule by mouth Daily. - Oral    multivitamin (DAILY RED) tablet 1 tablet 1 tablet Daily 4/7/2018     Sig - Route: Take 1 tablet by mouth Daily. - Oral    naloxone (NARCAN) injection 0.4 mg 0.4 mg Every 5 Minutes PRN 4/6/2018     Sig - Route: Infuse 1 mL into a venous catheter Every 5 (Five) Minutes As Needed for Respiratory Depression. - Intravenous    Linked Group 1:  \"And\" Linked Group Details        nitroglycerin (NITROSTAT) SL tablet 0.4 mg 0.4 mg Every 5 Minutes PRN 4/6/2018     Sig - Route: Place 1 tablet under the tongue Every 5 (Five) Minutes As Needed for Chest Pain (if systolic BP greater than 100 mm/Hg.). - Sublingual    nystatin susp + lidocaine viscous (MAGIC MOUTHWASH) oral suspension 5 mL 4 Times Daily 4/11/2018     Sig - Route: Swish and swallow 5 mL 4 (Four) Times a Day. - Swish & Swallow    Cosign for Ordering: Accepted by Анна Mai DO on 4/11/2018  7:31 PM    ondansetron (ZOFRAN) injection 4 mg 4 mg Every 6 Hours PRN 4/7/2018     Sig - Route: Infuse 2 mL into a venous catheter Every 6 (Six) Hours As Needed for Nausea or Vomiting. - Intravenous    polyethylene glycol 3350 powder (packet) 17 g Daily PRN 4/11/2018     Sig - Route: Take " "17 g by mouth Daily As Needed for Constipation. - Oral    sodium chloride 0.9 % flush 1-10 mL 1-10 mL As Needed 4/6/2018     Sig - Route: Infuse 1-10 mL into a venous catheter As Needed for Line Care. - Intravenous    sodium chloride 0.9 % flush 10 mL 10 mL As Needed 4/6/2018     Sig - Route: Infuse 10 mL into a venous catheter As Needed for Line Care. - Intravenous    Linked Group 2:  \"And\" Linked Group Details                Lab Results (last 24 hours)     Procedure Component Value Units Date/Time    Basic Metabolic Panel [[id number]]  (Abnormal) Collected:  04/12/18 0114    Specimen:  Blood Updated:  04/12/18 0217     Glucose 102 mg/dL      BUN 19 mg/dL      Creatinine 0.51 mg/dL      Sodium 137 mmol/L      Potassium 4.4 mmol/L      Chloride 102 mmol/L      CO2 31.5 mmol/L      Calcium 9.3 mg/dL      eGFR Non African Amer 119 mL/min/1.73      BUN/Creatinine Ratio 37.3 (H)     Anion Gap 3.5 (L) mmol/L     Narrative:       The MDRD GFR formula is only valid for adults with stable renal function between ages 18 and 70.    Osmolality, Calculated [[id number]]  (Normal) Collected:  04/12/18 0114    Specimen:  Blood Updated:  04/12/18 0217     Osmolality Calc 276.3 mOsm/kg     CBC & Differential [[id number]] Collected:  04/12/18 0114    Specimen:  Blood Updated:  04/12/18 0152    Narrative:       The following orders were created for panel order CBC & Differential.  Procedure                               Abnormality         Status                     ---------                               -----------         ------                     CBC Auto Differential[[id number]]        Abnormal            Final result                 Please view results for these tests on the individual orders.    CBC Auto Differential [[id number]]  (Abnormal) Collected:  04/12/18 0114    Specimen:  Blood Updated:  04/12/18 0152     WBC 8.28 10*3/mm3      RBC 2.86 (L) 10*6/mm3      Hemoglobin 8.8 (L) g/dL      Hematocrit 28.2 (L) %      MCV 98.6 (H) " fL      MCH 30.8 pg      MCHC 31.2 (L) g/dL      RDW 13.7 %      RDW-SD 46.3 fl      MPV 9.9 fL      Platelets 488 (H) 10*3/mm3      Neutrophil % 60.5 %      Lymphocyte % 25.7 %      Monocyte % 9.5 %      Eosinophil % 4.0 %      Basophil % 0.2 %      Immature Grans % 0.1 %      Neutrophils, Absolute 5.00 10*3/mm3      Lymphocytes, Absolute 2.13 10*3/mm3      Monocytes, Absolute 0.79 10*3/mm3      Eosinophils, Absolute 0.33 10*3/mm3      Basophils, Absolute 0.02 10*3/mm3      Immature Grans, Absolute 0.01 10*3/mm3         Orders (last 24 hrs)     Start     Ordered    04/12/18 1344  Inpatient Pneumonia Education (QA) Consult  Once     Provider:  (Not yet assigned)    04/12/18 1344    04/12/18 1344  Inpatient Pneumonia Education (RT) Consult  Once     Provider:  (Not yet assigned)    04/12/18 1344    04/12/18 1201  Continue Indwelling Urinary Catheter  Once      04/12/18 1201    04/12/18 1201  Assess Need for Indwelling Urinary Catheter - Follow Removal Protocol  Continuous     Comments:  Indwelling Urinary Catheter Removal Criteria  Discontinue Indwelling Urinary Catheter Unless One of the Following is Present  Urinary Retention or Obstruction  Chronic Naqvi Catheter Use  End of Life  Critical Illness with Strict I/O   Tract or Abdominal Surgery  Stage 3/4 Sacral / Perineal Wound  Required Activity Restriction: Trauma  Required Activity Restriction: Spine Surgery  If Patient is Being Followed by Urology Contact Them PRIOR to Removal  Do Not Remove Indwelling Urinary Catheter Order is Present with a CLINICAL REASON to Maintain the Catheter. Provider is Required to Include a Clinical Reason to Maintain a Urinary Catheter    Chronic Naqvi Catheter Use (Present on Admission)  Assess for Continued Need & Document Medical Necessity  If Infection is Suspected, Contact the Provider        04/12/18 1201    04/12/18 1201  Catheter Care  Every Shift      04/12/18 1201    04/12/18 0600  CBC & Differential  Morning Draw       04/11/18 1849    04/12/18 0600  Basic Metabolic Panel  Morning Draw      04/11/18 1849    04/12/18 0600  CBC Auto Differential  PROCEDURE ONCE      04/12/18 0002    04/12/18 0218  Osmolality, Calculated  Once      04/12/18 0217    04/11/18 1800  Dietary Nutrition Supplements Ensure Plus  Daily With Breakfast, Lunch & Dinner     Comments:  Patient requests strawberry    04/11/18 1203    04/11/18 1800  nystatin susp + lidocaine viscous (MAGIC MOUTHWASH) oral suspension  4 Times Daily      04/11/18 1634    04/11/18 1729  DIET MESSAGE Patient preferences strawberry ensure  Once     Comments:  Patient preferences strawberry ensure    04/11/18 1728    04/11/18 1700  lactobacillus acidophilus (RISAQUAD) capsule 1 capsule  Daily      04/11/18 1504    04/11/18 1200  docusate sodium (COLACE) capsule 100 mg  2 Times Daily      04/11/18 1052    04/11/18 1052  polyethylene glycol 3350 powder (packet)  Daily PRN      04/11/18 1052    04/10/18 0730  carbidopa-levodopa (SINEMET)  MG per tablet 1 tablet  Every 8 Hours Scheduled      04/10/18 0611    04/09/18 0900  enoxaparin (LOVENOX) syringe 40 mg  Daily      04/08/18 1425    04/09/18 0800  ferrous sulfate tablet 325 mg  Daily With Breakfast      04/08/18 1434    04/08/18 1425  HYDROcodone-acetaminophen (NORCO) 5-325 MG per tablet 1 tablet  Every 4 Hours PRN      04/08/18 1425    04/07/18 2113  ondansetron (ZOFRAN) injection 4 mg  Every 6 Hours PRN      04/07/18 2114    04/07/18 1500  doxycycline (MONODOX) capsule 100 mg  Every 12 Hours Scheduled      04/07/18 1313    04/07/18 0900  multivitamin (DAILY RED) tablet 1 tablet  Daily      04/06/18 2223    04/06/18 2156  naloxone (NARCAN) injection 0.4 mg  Every 5 Minutes PRN      04/06/18 2156    04/06/18 2156  nitroglycerin (NITROSTAT) SL tablet 0.4 mg  Every 5 Minutes PRN      04/06/18 2156    04/06/18 2156  sodium chloride 0.9 % flush 1-10 mL  As Needed      04/06/18 2156    04/06/18 1422  sodium chloride 0.9 % flush 10 mL   As Needed      18 1422    Unscheduled  ECG 12 Lead  As Needed     Comments:  Nurse to Release if Patient Expericences Acute Chest Pain or Dysrhythmias    18    Unscheduled  Potassium  As Needed     Comments:  For Ventricular Arrhythmias      18    Unscheduled  Magnesium  As Needed     Comments:  For Ventricular Arrhythmias      18    Unscheduled  Troponin  As Needed     Comments:  For Chest Pain      18    Unscheduled  Digoxin Level  As Needed     Comments:  For Atrial Arrhythmias      18    Unscheduled  Blood Gas, Arterial  As Needed     Comments:  Per O2 PolicyNotify Physician      18    --  multivitamin (DAILY RED) tablet tablet  Daily      18    --  diphenhydrAMINE (BENADRYL) 25 mg capsule  2 Times Daily PRN      18          Operative/Procedure Notes (last 24 hours) (Notes from 2018  2:38 PM through 2018  2:38 PM)     No notes of this type exist for this encounter.           Physician Progress Notes (last 24 hours) (Notes from 2018  2:38 PM through 2018  2:38 PM)      Анна Mai DO at 2018  6:49 PM          HOSPITALIST PROGRESS NOTE    Patient Identification:  Name:  Debra Resendiz  Age:  72 y.o.  Sex:  female  :  1945  MRN:  [id number]  Visit Number:  [id number]  Primary Care Provider:  No Known Provider    Length of stay:  5     HPI: 71 yo female admitted with L shoulder and R knee pain after a fall    Procedures:  -Left proximal humerus ORIF with plate and screws    Subjective:  The patient was seen late this morning and was sitting in her bedside recliner. She was assisted by PT and also underwent therapy on her left shoulder.    The patient continues to complain of shoulder and knee pain. She also reports constipation. She denies any dyspnea, chest pain, nausea and/or vomiting. She reported a poor appetite this am.    Present during exam: TAMEKA Bone    Current [facility]  Meds:    carbidopa-levodopa 1 tablet Oral Q8H   docusate sodium 100 mg Oral BID   doxycycline 100 mg Oral Q12H   enoxaparin 40 mg Subcutaneous Daily   ferrous sulfate 325 mg Oral Daily With Breakfast   lactobacillus acidophilus 1 capsule Oral Daily   multivitamin 1 tablet Oral Daily   nystatin susp + lidocaine viscous 5 mL Swish & Swallow 4x Daily     Vital Signs  Temp:  [98.1 °F (36.7 °C)-98.4 °F (36.9 °C)] 98.1 °F (36.7 °C)  Heart Rate:  [] 104  Resp:  [14-16] 16  BP: (113-137)/(44-62) 137/56  1    04/09/18  0430 04/10/18  0323 04/11/18  0324   Weight: 54.4 kg (120 lb) 55.8 kg (123 lb) 53.5 kg (118 lb)     Body mass index is 19.05 kg/m².    Physical exam:  Physical Exam   Constitutional: She is oriented to person, place, and time. She appears well-developed and well-nourished. No distress.   HENT:   Head: Normocephalic and atraumatic.   Mouth/Throat: Oropharynx is clear and moist.   Eyes: Conjunctivae and EOM are normal. Pupils are equal, round, and reactive to light.   Neck: Neck supple. No tracheal deviation present. No thyromegaly present.   Cardiovascular: Normal rate and regular rhythm.  Exam reveals no gallop and no friction rub.    No murmur heard.  Pulmonary/Chest: Breath sounds normal. No respiratory distress. She has no wheezes. She has no rales.   Cardiovascular and pulmonary examination may be difficult due to left sided immobilizer that is in place   Abdominal: Soft. Bowel sounds are normal. She exhibits no distension. There is no tenderness. There is no guarding.   Musculoskeletal: She exhibits no edema.        Right knee: She exhibits decreased range of motion.   Right knee immobilizer in place   Neurological: She is alert and oriented to person, place, and time. No cranial nerve deficit.   Question of tremor in right lower extremity noted on my exam; ANABEL VARGHESE   Skin: Skin is warm and dry. No rash noted. No erythema.   Psychiatric: She has a normal mood and affect.      Results  Review:    Results from last 7 days  Lab Units 04/11/18  0115 04/10/18  0317 04/09/18  0215 04/08/18  1510 04/08/18  0318 04/07/18  0207 04/06/18  1920   WBC 10*3/mm3 8.07 7.78 9.67  --  10.06 11.72 16.10*   HEMOGLOBIN g/dL 8.7* 8.9* 8.7* 10.0* 10.1* 9.6* 10.2*   HEMATOCRIT % 27.6* 27.7* 27.4* 30.9* 31.1* 30.1* 31.1*   PLATELETS 10*3/mm3 404* 345 323  --  306 330 353       Results from last 7 days  Lab Units 04/11/18  0115 04/10/18  0317 04/09/18  0215 04/08/18  0318 04/07/18  0208 04/06/18  1920   SODIUM mmol/L 138 137 134* 135 136 137   POTASSIUM mmol/L 4.0 3.9 3.9 3.9 4.0 4.2   CHLORIDE mmol/L 102 103 102 103 108 107   CO2 mmol/L 30.4 31.2 26.6 24.7 19.9* 25.8   BUN mg/dL 19 15 15 16 18 17   CREATININE mg/dL 0.46 0.45 0.58 0.55 0.64 0.64   CALCIUM mg/dL 9.2 9.2 9.0 9.3 9.0 9.3   GLUCOSE mg/dL 92 94 94 87 113* 109       Results from last 7 days  Lab Units 04/07/18  0208 04/06/18  1920   BILIRUBIN mg/dL 0.4 0.4   ALK PHOS U/L 80 86   AST (SGOT) U/L 26 27   ALT (SGPT) U/L 19 19           Results from last 7 days  Lab Units 04/07/18  0536 04/06/18  1920   INR  1.14* 1.04       Results from last 7 days  Lab Units 04/08/18  1510 04/07/18  0926 04/07/18  0208 04/06/18  2120   CK TOTAL U/L  --  112 112 97   TROPONIN I ng/mL 0.068* 0.131* 0.158* 0.101*   CK MB INDEX %  --  2.2 2.7 3.2*   MYOGLOBIN ng/mL  --  58.0 96.0 102.0       Results from last 7 days  Lab Units 04/06/18  1920   BNP pg/mL 71.0         Assessment/Plan     -Closed left proximal humerus fracture status post ORIF, POD #2  -Acute blood loss anemia  -Mild thrombocytosis  -Right tibial plateau fracture status post fall with recommendations for conservative therapy with nonweightbearing bearing status ×6 weeks  -Indeterminate troponin with occasional palpitations; troponin level has improved  -Questionable atypical pneumonia patient on a course of doxycycline  -Mild tachycardia that appears to have improved  -And leg and ankle pain with negative Doppler  ultrasound studies  -Questionable early parkinsonism     We will continue continue with doxycycline for now.  Continue with subcutaneous Lovenox.  Continue low-dose as needed analgesics for pain.  Continue to encourage physical and occupational therapy as tolerated. Inpatient physical rehabilitation has refused the patient. SW attempting skilled nursing placement.     Neurology has started the patient on Sinemet.    Begin Colace and PRN Miralax.    Continue to follow her hemoglobin and hematocrit closely and transfuse for hemoglobin less than 7.  I will repeat her CBC and BMP in the morning.    The patient is high risk due to the following diagnoses/reasons:  Left humerus fracture, right tibial plateau fracture, indeterminate troponin, question of parkinsonism    I discussed the patients findings and my recommendations with patient, SW and nursing staff.    Disposition  Unclear; inpatient rehab has refused the patient. SW attempting SNF.      Анна Mai DO  04/11/18  6:49 PM        Electronically signed by Анна Mai DO at 4/11/2018  6:53 PM       Consult Notes (last 24 hours) (Notes from 4/11/2018  2:38 PM through 4/12/2018  2:38 PM)     No notes of this type exist for this encounter.           Physical Therapy Notes (last 24 hours) (Notes from 4/11/2018  2:38 PM through 4/12/2018  2:38 PM)      Marta Mckeon PTA at 4/11/2018  4:53 PM  Version 1 of 1         Problem: Patient Care Overview  Goal: Plan of Care Review   04/11/18 1652   Coping/Psychosocial   Plan of Care Reviewed With patient;son   Coping/Psychosocial   Patient Agreement with Plan of Care agrees   Plan of Care Review   Progress improving   OTHER   Outcome Summary Patient continues to require skilled care and education per POc to tolerance for further improved LE strength and functional mobility.            Electronically signed by Marta Mckeon PTA at 4/11/2018  4:53 PM     Marta Mckeon PTA at 4/11/2018  4:54 PM   Version 1 of 1         Acute Care - Physical Therapy Treatment Note   [place]     Patient Name: Debra Resendiz  : 1945  MRN: [id number]  Today's Date: 2018  Onset of Illness/Injury or Date of Surgery: 18  Date of Referral to PT: 18  Referring Physician: Dr. Aburto    Admit Date: 2018    Visit Dx:    ICD-10-CM ICD-9-CM   1. Closed displaced comminuted fracture of shaft of left humerus, initial encounter S42.352A 812.21   2. Right medial tibial plateau fracture, closed, initial encounter S82.131A 823.00     Patient Active Problem List   Diagnosis   • Closed displaced comminuted fracture of shaft of left humerus       Therapy Treatment          Rehabilitation Treatment Summary     Row Name 18 1648 18 1508          Treatment Time/Intention    Discipline  -- occupational therapist  -KR     Document Type therapy note (daily note)  -RF therapy note (daily note)  -KR     Mode of Treatment individual therapy;physical therapy   treated BID  -RF  --     Therapy Frequency (PT Clinical Impression) 5 times/wk   1-2 times/ day per priority policy  -RF  --     Patient Effort adequate  -RF good  -KR     Existing Precautions/Restrictions brace worn when out of bed;fall;non-weight bearing;oxygen therapy device and L/min;weight bearing;other (see comments)   NWB L UE and R LE; L UE sling, R KI   -RF  --     Patient Response to Treatment Patient demonstrated improved wbing on LLE with bed>chair transfer this AM. C/o pain noted while sitting in chair so patient requested to return to bed shortly. Education provided on importance of OOB daily and complaince of HEP for improved strengthening. Patient refused OOB in PM. HEp reviewed and pt provided education on importance of complaince.   -RF  --     Recorded by [RF] Marta Mckeon, PTA 18 1652 18 1652 [KR] Viraj Ignacio OT 18 1513 18 1513     Row Name 18 1648             Cognitive Assessment/Intervention- PT/OT     Orientation Status (Cognition) oriented x 3  -RF      Follows Commands (Cognition) follows one step commands;delayed response/completion;increased processing time needed;initiation impaired;physical/tactile prompts required  -RF      Recorded by [RF] Marta Mckeon, PTA 04/11/18 1652 04/11/18 1652      Row Name 04/11/18 1648             Mobility Assessment/Intervention    Extremity Weight-bearing Status left upper extremity;right upper extremity;left lower extremity;right lower extremity  -RF      Left Upper Extremity (Weight-bearing Status) non weight-bearing (NWB)  -RF      Right Upper Extremity (Weight-bearing Status) full weight-bearing (FWB)  -RF      Left Lower Extremity (Weight-bearing Status) weight-bearing as tolerated (WBAT)  -RF      Right Lower Extremity (Weight-bearing Status) non weight-bearing (NWB)  -RF      Recorded by [RF] Marta Mckeon, PTA 04/11/18 1652 04/11/18 1652      Row Name 04/11/18 1648             Bed Mobility Assessment/Treatment    Bed Mobility Assessment/Treatment rolling right;scooting/bridging;supine-sit;sit-supine  -RF      Rolling Right [place] (Bed Mobility) verbal cues;nonverbal cues (demo/gesture);maximum assist (25% patient effort);2 person assist  -RF      Scooting/Bridging [place] (Bed Mobility) verbal cues;nonverbal cues (demo/gesture);maximum assist (25% patient effort);2 person assist  -RF      Supine-Sit [place] (Bed Mobility) verbal cues;nonverbal cues (demo/gesture);maximum assist (25% patient effort);2 person assist  -RF      Sit-Supine [place] (Bed Mobility) verbal cues;nonverbal cues (demo/gesture);maximum assist (25% patient effort);2 person assist  -RF      Bed Mobility, Safety Issues cognitive deficits limit understanding;decreased use of arms for pushing/pulling;decreased use of legs for bridging/pushing;impaired trunk control for bed mobility  -RF      Recorded by [RF] Marta Mckeon, PTA 04/11/18 1652 04/11/18 1652      Row Name  04/11/18 1648             Transfer Assessment/Treatment    Transfer Assessment/Treatment other (see comments);bed-chair transfer;chair-bed transfer  -RF      Bed-Chair [place] (Transfers) maximum assist (25% patient effort);2 person assist;nonverbal cues (demo/gesture);verbal cues  -RF      Chair-Bed [place] (Transfers) maximum assist (25% patient effort);2 person assist;nonverbal cues (demo/gesture);verbal cues  -RF      Assistive Device (Bed-Chair Transfers) --   stand-pivot  -RF      Recorded by [RF] Marta Mckeon, PTA 04/11/18 1652 04/11/18 1652      Row Name 04/11/18 1648             Chair-Bed Transfer    Assistive Device (Chair-Bed Transfers) --   stand-pivot  -RF      Recorded by [RF] Marta Mckeon, PTA 04/11/18 1652 04/11/18 1652      Row Name 04/11/18 1648             Gait/Stairs Assessment/Training    [place] Level (Gait) unable to assess  -RF      Recorded by [RF] Marta Mckeon, PTA 04/11/18 1652 04/11/18 1652      Row Name 04/11/18 1648             Motor Skills Assessment/Interventions    Additional Documentation Therapeutic Exercise (Group)  -RF      Recorded by [RF] Marta Mckeon, PTA 04/11/18 1652 04/11/18 1652      Row Name 04/11/18 1508             Therapeutic Exercise    Therapeutic Exercise seated, upper extremities  -KR      Recorded by [KR] Viraj Ignacio OT 04/11/18 1513 04/11/18 1513      Row Name 04/11/18 1508             Upper Extremity Seated Therapeutic Exercise    Exercise Type, Seated Upper Extremity (Therapeutic Exercise) PROM (passive range of motion)  -KR      Comment, Seated Upper Extremity (Therapeutic Exercise) PROM L elbow, wrist, hand. tolerated activity adequately. OT to continue POC. Removed slling during tx session and re-applied following session.   -KR      Recorded by [KR] Viraj Ignacio OT 04/11/18 1513 04/11/18 1513      Row Name 04/11/18 1648             Lower Extremity Supine Therapeutic Exercise    Performed, Supine Lower Extremity (Therapeutic  Exercise) --   AP, ankle circles, QS, SLR, GS  -RF      Exercise Type, Supine Lower Extremity (Therapeutic Exercise) AROM (active range of motion);isotonic contraction, concentric  -RF      Expected Outcomes, Supine Lower Extremity (Therapeutic Exercise) improve functional tolerance, self-care activity;improve performance, gait skills;strengthen normal movement patterns;strengthen, facilitate independent active range of motion  -RF      Sets/Reps Detail, Supine Lower Extremity (Therapeutic Exercise) 1X10  -RF      Recorded by [RF] Marta Mckeon, PTA 04/11/18 1652 04/11/18 1652      Row Name 04/11/18 1648             Positioning and Restraints    Pre-Treatment Position in bed   BID  -RF      In Bed supine;call light within reach;encouraged to call for assist;exit alarm on;with family/caregiver   PM  -RF      In Chair reclined;call light within reach;encouraged to call for assist;exit alarm on   Am  -RF      Recorded by [RF] Marta Mckeon, PTA 04/11/18 1652 04/11/18 1652      Row Name 04/11/18 1648             Pain Scale: Numbers Pre/Post-Treatment    Pain Location - Side Bilateral  -RF      Pain Location - Orientation other (see comments)   L UE> R LE pain  -RF      Recorded by [RF] Marta Mckeon, PTA 04/11/18 1652 04/11/18 1652      Row Name 04/11/18 1648             Pain Scale: FACES Pre/Post-Treatment    Pain: FACES Scale, Pretreatment 4-->hurts little more  -RF      Pain: FACES Scale, Post-Treatment 6-->hurts even more  -RF      Recorded by [RF] Marta Mckeon, PTA 04/11/18 1652 04/11/18 1652      Row Name 04/11/18 1648             Sensory Assessment/Intervention    Sensory General Assessment other (see comments)  -RF      Recorded by [RF] Marta Mckeon, PTA 04/11/18 1652 04/11/18 1652      Row Name 04/11/18 1648             Vision Assessment/Intervention    Visual Impairment/Limitations WFL  -RF      Recorded by [RF] Marta Mckeon, PTA 04/11/18 1652 04/11/18 1652      Row Name                 Wound 04/08/18 1047 Left arm incision    Wound - Properties Group Date first assessed: 04/08/18 [LT] Time first assessed: 1047 [LT] Side: Left [LT] Location: arm [LT] Type: incision [LT] Recorded by:  [LT] Trisah Allan RN 04/08/18 1047 04/08/18 1047    Row Name 04/11/18 1648             Coping    Observed Emotional State accepting  -RF      Recorded by [RF] Marta Mckeon, PTA 04/11/18 1652 04/11/18 1652      Row Name 04/11/18 1648             Outcome Summary/Treatment Plan (PT)    Anticipated Equipment Needs at Discharge (PT) wheelchair  -RF      Anticipated Discharge Disposition (PT) inpatient rehab facility;skilled nursing facility (SNF);still a patient  -RF      Recorded by [RF] Marta Mckeon, PTA 04/11/18 1652 04/11/18 1652        User Key  (r) = Recorded By, (t) = Taken By, (c) = Cosigned By    Initials Name Effective Dates Discipline    KR Viraj Ignacio OT 04/03/18 -  OT    LT Trisha Allan RN 06/16/16 -  Nurse    RF Marta Mckeon, PTA 03/07/18 -  PT          Wound 04/08/18 1047 Left arm incision (Active)   Dressing Appearance dry;intact 4/11/2018  8:50 AM   Closure [place] 4/10/2018  6:00 PM   Base pink;clean 4/11/2018  5:52 AM   Periwound pink;dry 4/11/2018  5:52 AM   Periwound Temperature cool 4/10/2018  6:00 PM   Drainage Characteristics/Odor serosanguineous;bleeding controlled 4/10/2018  6:00 PM   Drainage Amount small 4/10/2018  6:00 PM   Dressing Care, Wound dressing changed 4/11/2018  5:52 AM             Physical Therapy Education     Title: PT OT SLP Therapies (Done)     Topic: Physical Therapy (Done)     Point: Mobility training (Done)    Learning Progress Summary     Learner Status Readiness Method Response Comment Documented by    Patient Done Acceptance E VU  RF 04/11/18 1652     Done Acceptance E VU  AM 04/11/18 1611     Done Acceptance E VU  RF 04/10/18 1543     Done Acceptance INGRID WHITNEY NR  AG 04/09/18 1841    Family Done Acceptance E,LATRELL IGNACIO   04/09/18 1921          Point:  Home exercise program (Done)    Learning Progress Summary     Learner Status Readiness Method Response Comment Documented by    Patient Done Acceptance E VU  RF 04/11/18 1652     Done Acceptance E VU  AM 04/11/18 1611     Done Acceptance E VU  RF 04/10/18 1543     Done Acceptance E,D VU,NR  AG 04/09/18 1841    Family Done Acceptance E,D VU,NR  AG 04/09/18 1841          Point: Body mechanics (Done)    Learning Progress Summary     Learner Status Readiness Method Response Comment Documented by    Patient Done Acceptance E VU  RF 04/11/18 1652     Done Acceptance E VU  AM 04/11/18 1611     Done Acceptance E VU  RF 04/10/18 1543     Done Acceptance E,D VU,NR  AG 04/09/18 1841    Family Done Acceptance E,D VU,NR  AG 04/09/18 1841          Point: Precautions (Done)    Learning Progress Summary     Learner Status Readiness Method Response Comment Documented by    Patient Done Acceptance E VU  RF 04/11/18 1652     Done Acceptance E VU  AM 04/11/18 1611     Done Acceptance E VU  RF 04/10/18 1543     Done Acceptance E,D VU,NR  AG 04/09/18 1841    Family Done Acceptance E,D VU,NR  AG 04/09/18 1841                      User Key     Initials Effective Dates Name Provider Type Discipline     06/16/16 -  Smitha Glass, RN Registered Nurse Nurse     04/03/18 -  Nga Valera, PT Physical Therapist PT     03/07/18 -  Marta Mckeon, PTA Physical Therapy Assistant PT                    PT Recommendation and Plan  Anticipated Discharge Disposition (PT): inpatient rehab facility, skilled nursing facility (SNF), still a patient  Therapy Frequency (PT Clinical Impression): 5 times/wk (1-2 times/ day per priority policy)  Outcome Summary/Treatment Plan (PT)  Anticipated Equipment Needs at Discharge (PT): wheelchair  Anticipated Discharge Disposition (PT): inpatient rehab facility, skilled nursing facility (SNF), still a patient  Plan of Care Reviewed With: patient, son  Progress: improving  Outcome Summary: Patient  continues to require skilled care and education per POc to tolerance for further improved LE strength and functional mobility.           Outcome Measures     Row Name 04/10/18 1523 04/10/18 1500 04/09/18 1800       How much help from another person do you currently need...    Turning from your back to your side while in flat bed without using bedrails?  --  -- 2  -AG    Moving from lying on back to sitting on the side of a flat bed without bedrails?  --  -- 2  -AG    Moving to and from a bed to a chair (including a wheelchair)?  --  -- 2  -AG    Standing up from a chair using your arms (e.g., wheelchair, bedside chair)?  --  -- 1  -AG    Climbing 3-5 steps with a railing?  --  -- 1  -AG    To walk in hospital room?  --  -- 1  -AG    AM-PAC 6 Clicks Score  --  -- 9  -AG       How much help from another is currently needed...    Putting on and taking off regular lower body clothing? 1  -KR  --  --    Bathing (including washing, rinsing, and drying) 1  -KR  --  --    Toileting (which includes using toilet bed pan or urinal) 1  -KR  --  --    Putting on and taking off regular upper body clothing 1  -KR  --  --    Taking care of personal grooming (such as brushing teeth) 2  -KR  --  --    Eating meals 2  -KR  --  --    Score 8  -KR  --  --       Functional Assessment    Outcome Measure Options  -- AM-PAC 6 Clicks Daily Activity (OT)  -KR AM-PAC 6 Clicks Basic Mobility (PT)  -AG      User Key  (r) = Recorded By, (t) = Taken By, (c) = Cosigned By    Initials Name Provider Type    AG Nga Valera, PT Physical Therapist    YONIS Ignacio, OT Occupational Therapist           Time Calculation:         PT Charges     Row Name 04/11/18 1654 04/11/18 1653          Time Calculation    PT Received On 04/11/18  -RF 04/11/18  -RF     PT - Next Appointment 04/12/18  -RF 04/11/18  -RF     PT Goal Re-Cert Due Date 04/23/18  -RF 04/23/18  -RF        Time Calculation- PT    TCU Minutes- PT 26 min  -RF 24 min  -RF       User Key  (r) =  Recorded By, (t) = Taken By, (c) = Cosigned By    Initials Name Provider Type    RF Marta Mckeon PTA Physical Therapy Assistant          Therapy Charges for Today     Code Description Service Date Service Provider Modifiers Qty    50964215162 HC PT THERAPEUTIC ACT EA 15 MIN 4/10/2018 Marta Mckeon, PTA GP 4    56791064426 HC PT THER SUPP EA 15 MIN 4/10/2018 Marta Mckeon, PTA GP 4    01394442257 HC PT THERAPEUTIC ACT EA 15 MIN 2018 Marta Mckeon, PTA GP 3    56211852921 HC PT THER PROC EA 15 MIN 2018 Marta Mckeon, PTA GP 1    19084990156 HC PT THER SUPP EA 15 MIN 2018 Marta Mckeon, CHIOMA GP 4          PT G-Codes  Outcome Measure Options: AM-PAC 6 Clicks Daily Activity (OT)  Score: 9  Functional Limitation: Mobility: Walking and moving around  Mobility: Walking and Moving Around Current Status (): At least 60 percent but less than 80 percent impaired, limited or restricted  Mobility: Walking and Moving Around Goal Status (): At least 20 percent but less than 40 percent impaired, limited or restricted    Marta Mckeon PTA  2018         Electronically signed by Marta Mckeon PTA at 2018  4:55 PM          Occupational Therapy Notes (last 24 hours) (Notes from 2018  2:38 PM through 2018  2:38 PM)      Viraj Ignacio OT at 2018  2:04 PM          Problem: Patient Care Overview  Goal: Plan of Care Review  Outcome: Ongoing (interventions implemented as appropriate)   18 1404   Coping/Psychosocial   Plan of Care Reviewed With patient   Coping/Psychosocial   Patient Agreement with Plan of Care agrees   Plan of Care Review   Progress improving   OTHER   Outcome Summary Continue POC           Electronically signed by Viraj Ignacio OT at 2018  2:04 PM     Viraj Ignacio OT at 2018  2:03 PM          Acute Care - Occupational Therapy Treatment Note  [facility]     Patient Name: Debra Resendiz  : 1945  MRN: [id number]  Today's Date:  4/12/2018  Onset of Illness/Injury or Date of Surgery: 04/06/18     Referring Physician: Dr. Aburto    Admit Date: 4/6/2018       ICD-10-CM ICD-9-CM   1. Closed displaced comminuted fracture of shaft of left humerus, initial encounter S42.352A 812.21   2. Right medial tibial plateau fracture, closed, initial encounter S82.131A 823.00     Patient Active Problem List   Diagnosis   • Closed displaced comminuted fracture of shaft of left humerus     History reviewed. No pertinent past medical history.  Past Surgical History:   Procedure Laterality Date   • HIP SURGERY         Therapy Treatment          Rehabilitation Treatment Summary     Row Name 04/12/18 1358             Treatment Time/Intention    Discipline occupational therapist  -KR      Document Type therapy note (daily note)  -KR      Patient Effort good  -KR      Recorded by [KR] Viraj Ignacio OT 04/12/18 1402 04/12/18 1402      Row Name 04/12/18 1358             Therapeutic Exercise    Therapeutic Exercise supine, upper extremities  -KR      Recorded by [KR] Viraj Ignacio OT 04/12/18 1402 04/12/18 1402      Row Name 04/12/18 1358             Upper Extremity Supine Therapeutic Exercise    Performed, Supine Upper Extremity (Therapeutic Exercise) elbow flexion/extension  -KR      Comment, Supine Upper Extremity (Therapeutic Exercise) hand  ther ex  -KR      Recorded by [KR] Viraj Ignacio OT 04/12/18 1402 04/12/18 1402      Row Name                Wound 04/08/18 1047 Left arm incision    Wound - Properties Group Date first assessed: 04/08/18 [LT] Time first assessed: 1047 [LT] Side: Left [LT] Location: arm [LT] Type: incision [LT] Recorded by:  [LT] Trisha Allan RN 04/08/18 1047 04/08/18 1047      User Key  (r) = Recorded By, (t) = Taken By, (c) = Cosigned By    Initials Name Effective Dates Discipline    KR Viraj Ignacio OT 04/03/18 -  OT    LT Trisha Allan RN 06/16/16 -  Nurse        Wound 04/08/18 1047 Left arm incision (Active)   Dressing  Appearance dry;intact 4/12/2018  9:50 AM   Base closed/resurfaced 4/11/2018  4:00 PM   Periwound pink 4/11/2018  4:00 PM   Drainage Characteristics/Odor serosanguineous 4/11/2018  4:00 PM   Drainage Amount scant 4/11/2018  4:00 PM   Care, Wound cleansed with;sterile normal saline 4/11/2018  4:00 PM   Dressing Care, Wound dressing changed;gauze;non-adherent;petroleum-based;gauze, dry;abdominal binder utilized 4/11/2018  4:00 PM             OT Recommendation and Plan  Planned Therapy Interventions (OT Eval): ROM/therapeutic exercise, strengthening exercise, adaptive equipment training  Plan of Care Review  Plan of Care Reviewed With: patient  Plan of Care Reviewed With: patient  Outcome Summary: OT to continue POC.         Outcome Measures     Row Name 04/10/18 1523 04/10/18 1500 04/09/18 1800       How much help from another person do you currently need...    Turning from your back to your side while in flat bed without using bedrails?  --  -- 2  -AG    Moving from lying on back to sitting on the side of a flat bed without bedrails?  --  -- 2  -AG    Moving to and from a bed to a chair (including a wheelchair)?  --  -- 2  -AG    Standing up from a chair using your arms (e.g., wheelchair, bedside chair)?  --  -- 1  -AG    Climbing 3-5 steps with a railing?  --  -- 1  -AG    To walk in hospital room?  --  -- 1  -AG    AM-PAC 6 Clicks Score  --  -- 9  -AG       How much help from another is currently needed...    Putting on and taking off regular lower body clothing? 1  -KR  --  --    Bathing (including washing, rinsing, and drying) 1  -KR  --  --    Toileting (which includes using toilet bed pan or urinal) 1  -KR  --  --    Putting on and taking off regular upper body clothing 1  -KR  --  --    Taking care of personal grooming (such as brushing teeth) 2  -KR  --  --    Eating meals 2  -KR  --  --    Score 8  -KR  --  --       Functional Assessment    Outcome Measure Options  -- AM-PAC 6 Clicks Daily Activity (OT)  -KR  AM-PAC 6 Clicks Basic Mobility (PT)  -AG      User Key  (r) = Recorded By, (t) = Taken By, (c) = Cosigned By    Initials Name Provider Type    NASIM Valera, PT Physical Therapist    YONIS Ignacio OT Occupational Therapist           Time Calculation:         Time Calculation- OT     Row Name 18 1402             Time Calculation- OT    Total Timed Code Minutes- OT 30 minute(s)  -KR        User Key  (r) = Recorded By, (t) = Taken By, (c) = Cosigned By    Initials Name Provider Type    YONIS Ignacio OT Occupational Therapist           Therapy Charges for Today     Code Description Service Date Service Provider Modifiers Qty    84499120773 HC OT THERAPEUTIC ACT EA 15 MIN 2018 Viraj Ignacio OT GO 2    57118019075 HC OT THERAPEUTIC ACT EA 15 MIN 2018 Viraj Ignacio OT GO 2          OT G-codes  OT Professional Judgement Used?: Yes  Functional Limitation: Self care  Self Care Current Status (): At least 80 percent but less than 100 percent impaired, limited or restricted  Self Care Goal Status (): At least 40 percent but less than 60 percent impaired, limited or restricted    Viraj Ignacio OT  2018    Electronically signed by Viraj Ignacio OT at 2018  2:03 PM     Viraj Ignacio OT at 2018  3:15 PM          Problem: Patient Care Overview  Goal: Plan of Care Review  Outcome: Ongoing (interventions implemented as appropriate)   18 1515   Coping/Psychosocial   Plan of Care Reviewed With patient   Coping/Psychosocial   Patient Agreement with Plan of Care agrees   Plan of Care Review   Progress improving   OTHER   Outcome Summary OT to continue POC.            Electronically signed by Viraj Ignacio OT at 2018  3:15 PM     Viraj Ignacio OT at 2018  3:14 PM          Acute Care - Occupational Therapy Treatment Note  [facility]     Patient Name: Debra Resendiz  : 1945  MRN: [id number]  Today's Date: 2018  Onset of Illness/Injury or Date of Surgery: 18      Referring Physician: Dr. Aburto    Admit Date: 4/6/2018       ICD-10-CM ICD-9-CM   1. Closed displaced comminuted fracture of shaft of left humerus, initial encounter S42.352A 812.21   2. Right medial tibial plateau fracture, closed, initial encounter S82.131A 823.00     Patient Active Problem List   Diagnosis   • Closed displaced comminuted fracture of shaft of left humerus     History reviewed. No pertinent past medical history.  Past Surgical History:   Procedure Laterality Date   • HIP SURGERY         Therapy Treatment          Rehabilitation Treatment Summary     Row Name 04/11/18 1508             Treatment Time/Intention    Discipline occupational therapist  -KR      Document Type therapy note (daily note)  -KR      Patient Effort good  -KR      Recorded by [KR] Viraj Ignacio OT 04/11/18 1513 04/11/18 1513      Row Name 04/11/18 1508             Therapeutic Exercise    Therapeutic Exercise seated, upper extremities  -KR      Recorded by [KR] Viraj Ignacio OT 04/11/18 1513 04/11/18 1513      Row Name 04/11/18 1508             Upper Extremity Seated Therapeutic Exercise    Exercise Type, Seated Upper Extremity (Therapeutic Exercise) PROM (passive range of motion)  -KR      Comment, Seated Upper Extremity (Therapeutic Exercise) PROM L elbow, wrist, hand. tolerated activity adequately. OT to continue POC. Removed slling during tx session and re-applied following session.   -KR      Recorded by [KR] Viraj Ignacio OT 04/11/18 1513 04/11/18 1513      Row Name                Wound 04/08/18 1047 Left arm incision    Wound - Properties Group Date first assessed: 04/08/18 [LT] Time first assessed: 1047 [LT] Side: Left [LT] Location: arm [LT] Type: incision [LT] Recorded by:  [LT] Trisha Allan RN 04/08/18 1047 04/08/18 1047      User Key  (r) = Recorded By, (t) = Taken By, (c) = Cosigned By    Initials Name Effective Dates Discipline    KR Viraj Ignacio OT 04/03/18 -  OT    LT Trisah Allan RN 06/16/16 -   Nurse        Wound 04/08/18 1047 Left arm incision (Active)   Dressing Appearance dry;intact 4/11/2018  8:50 AM   Closure Staples 4/10/2018  6:00 PM   Base pink;clean 4/11/2018  5:52 AM   Periwound pink;dry 4/11/2018  5:52 AM   Periwound Temperature cool 4/10/2018  6:00 PM   Drainage Characteristics/Odor serosanguineous;bleeding controlled 4/10/2018  6:00 PM   Drainage Amount small 4/10/2018  6:00 PM   Dressing Care, Wound dressing changed 4/11/2018  5:52 AM             OT Recommendation and Plan  Planned Therapy Interventions (OT Eval): ROM/therapeutic exercise, strengthening exercise, adaptive equipment training           Outcome Measures     Row Name 04/10/18 1523 04/10/18 1500 04/09/18 1800       How much help from another person do you currently need...    Turning from your back to your side while in flat bed without using bedrails?  --  -- 2  -AG    Moving from lying on back to sitting on the side of a flat bed without bedrails?  --  -- 2  -AG    Moving to and from a bed to a chair (including a wheelchair)?  --  -- 2  -AG    Standing up from a chair using your arms (e.g., wheelchair, bedside chair)?  --  -- 1  -AG    Climbing 3-5 steps with a railing?  --  -- 1  -AG    To walk in hospital room?  --  -- 1  -AG    AM-PAC 6 Clicks Score  --  -- 9  -AG       How much help from another is currently needed...    Putting on and taking off regular lower body clothing? 1  -KR  --  --    Bathing (including washing, rinsing, and drying) 1  -KR  --  --    Toileting (which includes using toilet bed pan or urinal) 1  -KR  --  --    Putting on and taking off regular upper body clothing 1  -KR  --  --    Taking care of personal grooming (such as brushing teeth) 2  -KR  --  --    Eating meals 2  -KR  --  --    Score 8  -KR  --  --       Functional Assessment    Outcome Measure Options  -- AM-PAC 6 Clicks Daily Activity (OT)  -KR AM-PAC 6 Clicks Basic Mobility (PT)  -AG      User Key  (r) = Recorded By, (t) = Taken By, (c) =  Cosigned By    Initials Name Provider Type    NASIM Valera, PT Physical Therapist    YONIS Ignacio OT Occupational Therapist           Time Calculation:         Time Calculation- OT     Row Name 04/11/18 1513             Time Calculation- OT    Total Timed Code Minutes- OT 30 minute(s)  -YONIS        User Key  (r) = Recorded By, (t) = Taken By, (c) = Cosigned By    Initials Name Provider Type    YONIS Ignacio OT Occupational Therapist           Therapy Charges for Today     Code Description Service Date Service Provider Modifiers Qty    70372200918 HC OT SELFCARE CURRENT 4/10/2018 Viraj Ignacio OT GO, CM 1    32377090785 HC OT SELFCARE PROJECTED 4/10/2018 Viraj Ignacio OT GO, CK 1    91730850605 HC OT EVAL HIGH COMPLEXITY 2 4/10/2018 Viraj Ignacio OT GO 1    [id number]  OT THERAPEUTIC ACT EA 15 MIN 4/11/2018 Viraj Ignacio OT GO 2          OT G-codes  OT Professional Judgement Used?: Yes  Functional Limitation: Self care  Self Care Current Status (): At least 80 percent but less than 100 percent impaired, limited or restricted  Self Care Goal Status (): At least 40 percent but less than 60 percent impaired, limited or restricted    Viraj Ignacio OT  4/11/2018    Electronically signed by Viraj Ignacio OT at 4/11/2018  3:15 PM

## 2018-04-12 NOTE — PROGRESS NOTES
Discharge Planning Assessment   [facility]     Patient Name: Debra Resendiz  MRN: [id number]  Today's Date: 4/12/2018    Admit Date: 4/6/2018          Discharge Needs Assessment    No documentation.           Discharge Plan     Row Name 04/12/18 1552       Plan    Plan SS spoke with pt and pt's son on this date.  Pt son prefers [facility].  SS faxed pt information to [facility] for review for possible admit and notified Rhiannon.  SS will follow.        Destination     No service coordination in this encounter.      Durable Medical Equipment     No service coordination in this encounter.      Dialysis/Infusion     No service coordination in this encounter.      Home Medical Care     No service coordination in this encounter.      Social Care     No service coordination in this encounter.        Expected Discharge Date and Time     Expected Discharge Date Expected Discharge Time    Apr 12, 2018               Demographic Summary    No documentation.           Functional Status    No documentation.           Psychosocial    No documentation.           Abuse/Neglect    No documentation.           Legal    No documentation.           Substance Abuse    No documentation.           Patient Forms    No documentation.         Meg Fang

## 2018-04-12 NOTE — PLAN OF CARE
Problem: Patient Care Overview  Goal: Plan of Care Review   04/12/18 1806   Coping/Psychosocial   Plan of Care Reviewed With patient;son   Coping/Psychosocial   Patient Agreement with Plan of Care agrees   Plan of Care Review   Progress improving   OTHER   Outcome Summary continue POC.

## 2018-04-12 NOTE — PLAN OF CARE
Problem: Patient Care Overview  Goal: Plan of Care Review  Outcome: Ongoing (interventions implemented as appropriate)   04/12/18 1404   Coping/Psychosocial   Plan of Care Reviewed With patient   Coping/Psychosocial   Patient Agreement with Plan of Care agrees   Plan of Care Review   Progress improving   OTHER   Outcome Summary Continue POC

## 2018-04-12 NOTE — PROGRESS NOTES
HOSPITALIST PROGRESS NOTE    Patient Identification:  Name:  Debra Resendiz  Age:  72 y.o.  Sex:  female  :  1945  MRN:  [id number]  Visit Number:  [id number]  Primary Care Provider:  No Known Provider    Length of stay:  6     HPI: 71 yo female admitted with L shoulder and R knee pain after a fall    Procedures:  -Left proximal humerus ORIF with plate and screws    Subjective:  Today, the patient was resting in bed per my evaluation this evening. She had just eaten her dinner tray. She states that she is doing okay, but could be better. She states that her pain is well controlled, she is not requiring many doses of pain medication. She did work with PT today and tolerated well, she states she worked for about two hours and got to bedside chair. She does continue with mild pain but nothing severe at this time. She still reports no bowel movement. She states that the tremor in her legs has made her legs feel tired, she states it is aggravating to her. She has been using incentive spirometry. Eating well. She denies any acute events or new complaints. She denies any chest pain or shortness of breath. No cough. She denies any nausea, vomiting, or diarrhea. She denies any abdominal pain.     Present during exam: N/A    Current Hospital Meds:    carbidopa-levodopa 1 tablet Oral Q8H   docusate sodium 100 mg Oral BID   doxycycline 100 mg Oral Q12H   enoxaparin 40 mg Subcutaneous Daily   ferrous sulfate 325 mg Oral Daily With Breakfast   lactobacillus acidophilus 1 capsule Oral Daily   multivitamin 1 tablet Oral Daily   nystatin susp + lidocaine viscous 5 mL Swish & Swallow 4x Daily     Vital Signs  Temp:  [97.7 °F (36.5 °C)-99.3 °F (37.4 °C)] 97.7 °F (36.5 °C)  Heart Rate:  [85-98] 89  Resp:  [14-18] 18  BP: (118-130)/(45-69) 125/51  1    04/10/18  0323 18  0324 18   Weight: 55.8 kg (123 lb) 53.5 kg (118 lb) 56.2 kg (124 lb)     Body mass index is 20.01 kg/m².    Physical exam:  Physical Exam    Constitutional: She is oriented to person, place, and time. She appears well-developed and well-nourished. No distress.   HENT:   Head: Normocephalic and atraumatic.   Mouth/Throat: Oropharynx is clear and moist.   Eyes: Conjunctivae and EOM are normal. Pupils are equal, round, and reactive to light.   Neck: Neck supple. No tracheal deviation present. No thyromegaly present.   Cardiovascular: Normal rate and regular rhythm.  Exam reveals no gallop and no friction rub.    No murmur heard.  Pulmonary/Chest: Breath sounds normal. No respiratory distress. She has no wheezes. She has no rales.   Cardiovascular and pulmonary examination may be difficult due to left sided immobilizer that is in place   Abdominal: Soft. Bowel sounds are normal. She exhibits no distension. There is no tenderness. There is no guarding.   Musculoskeletal: She exhibits no edema.        Right knee: She exhibits decreased range of motion.   Right knee immobilizer in place   Neurological: She is alert and oriented to person, place, and time. No cranial nerve deficit.   Question of tremor in right lower extremity noted on my exam; ANABEL VARGHESE   Skin: Skin is warm and dry. No rash noted. No erythema.   Psychiatric: She has a normal mood and affect.        Telemetry: Sinus 80s-90s (I personally reviewed the telemetry strips)     Results Review:    Results from last 7 days  Lab Units 04/12/18  0114 04/11/18  0115 04/10/18  0317 04/09/18  0215 04/08/18  1510 04/08/18  0318 04/07/18  0207 04/06/18  1920   WBC 10*3/mm3 8.28 8.07 7.78 9.67  --  10.06 11.72 16.10*   HEMOGLOBIN g/dL 8.8* 8.7* 8.9* 8.7* 10.0* 10.1* 9.6* 10.2*   HEMATOCRIT % 28.2* 27.6* 27.7* 27.4* 30.9* 31.1* 30.1* 31.1*   PLATELETS 10*3/mm3 488* 404* 345 323  --  306 330 353       Results from last 7 days  Lab Units 04/12/18  0114 04/11/18  0115 04/10/18  0317 04/09/18  0215 04/08/18  0318 04/07/18  0208 04/06/18  1920   SODIUM mmol/L 137 138 137 134* 135 136 137   POTASSIUM mmol/L 4.4 4.0  3.9 3.9 3.9 4.0 4.2   CHLORIDE mmol/L 102 102 103 102 103 108 107   CO2 mmol/L 31.5 30.4 31.2 26.6 24.7 19.9* 25.8   BUN mg/dL 19 19 15 15 16 18 17   CREATININE mg/dL 0.51 0.46 0.45 0.58 0.55 0.64 0.64   CALCIUM mg/dL 9.3 9.2 9.2 9.0 9.3 9.0 9.3   GLUCOSE mg/dL 102 92 94 94 87 113* 109       Results from last 7 days  Lab Units 04/07/18  0208 04/06/18  1920   BILIRUBIN mg/dL 0.4 0.4   ALK PHOS U/L 80 86   AST (SGOT) U/L 26 27   ALT (SGPT) U/L 19 19           Results from last 7 days  Lab Units 04/07/18  0536 04/06/18  1920   INR  1.14* 1.04       Results from last 7 days  Lab Units 04/08/18  1510 04/07/18  0926 04/07/18  0208 04/06/18  2120   CK TOTAL U/L  --  112 112 97   TROPONIN I ng/mL 0.068* 0.131* 0.158* 0.101*   CK MB INDEX %  --  2.2 2.7 3.2*   MYOGLOBIN ng/mL  --  58.0 96.0 102.0       Results from last 7 days  Lab Units 04/06/18  1920   BNP pg/mL 71.0         Assessment/Plan     -Closed left proximal humerus fracture status post ORIF, POD #3  -Acute blood loss anemia  -Mild thrombocytosis  -Right tibial plateau fracture status post fall with recommendations for conservative therapy with nonweightbearing bearing status ×6 weeks  -Indeterminate troponin with occasional palpitations; troponin level has improved  -Questionable atypical pneumonia patient on a course of doxycycline  -Mild tachycardia that appears to have improved  -Right leg and ankle pain with negative Doppler ultrasound studies  -Questionable early parkinsonism   -Constipation     Orthopedic surgery has seen the patient, recommends follow up in two weeks with Dr. Aburto. Continue as needed pain medication. Continue PT as available and as tolerated per ortho recommendations. She is non weight bearing on right lower extremity. Continue sinemet initiated by neurology. Patient is awaiting SNF placement, she was denied by inpatient rehab at our facility.  following.     Continue doxycycline for atypical pneumonia. She has two more days  left of abx. Continue to encourage incentive spirometry. I had patient perform incentive spirometry in front of me, she made it to 1000 four times in a row, she states she has been doing this diligently.     Patient still with no bowel movement, colace was started yesterday as well as PRN miralax. Will schedule miralax and add PRN enemas. Encourage PO intake.      H&H remain stable, low. Continue to closely monitor hemoglobin and plan to transfuse if hemoglobin were to drop below 7.0.     Continue to closely monitor electrolytes and replace per protocol as necessary. Will repeat labs in the mroning and continue to closely monitor the patient on telemetry.       DVT prophylaxis: Lovenox     The patient is high risk due to the following diagnoses/reasons:  Left humerus fracture, right tibial plateau fracture, indeterminate troponin, question of parkinsonism    I discussed the patients findings and my recommendations with patient, SW and nursing staff.    Disposition  Unclear; inpatient rehab has refused the patient. SW attempting SNF, awaiting possible placement.      CRISSY Paz  04/12/18  5:48 PM

## 2018-04-12 NOTE — THERAPY TREATMENT NOTE
Acute Care - Occupational Therapy Treatment Note  [facility]     Patient Name: Debra Resendiz  : 1945  MRN: [id number]  Today's Date: 2018  Onset of Illness/Injury or Date of Surgery: 18     Referring Physician: Dr. Aburto    Admit Date: 2018       ICD-10-CM ICD-9-CM   1. Closed displaced comminuted fracture of shaft of left humerus, initial encounter S42.352A 812.21   2. Right medial tibial plateau fracture, closed, initial encounter S82.131A 823.00     Patient Active Problem List   Diagnosis   • Closed displaced comminuted fracture of shaft of left humerus     History reviewed. No pertinent past medical history.  Past Surgical History:   Procedure Laterality Date   • HIP SURGERY         Therapy Treatment          Rehabilitation Treatment Summary     Row Name 18 1358             Treatment Time/Intention    Discipline occupational therapist  -KR      Document Type therapy note (daily note)  -KR      Patient Effort good  -KR      Recorded by [KR] Viraj Ignacio OT 18 1402 18 1402      Row Name 18 1358             Therapeutic Exercise    Therapeutic Exercise supine, upper extremities  -KR      Recorded by [KR] Viraj Ignacio OT 18 1402 18 1402      Row Name 18 1358             Upper Extremity Supine Therapeutic Exercise    Performed, Supine Upper Extremity (Therapeutic Exercise) elbow flexion/extension  -KR      Comment, Supine Upper Extremity (Therapeutic Exercise) hand  ther ex  -KR      Recorded by [KR] Viraj Ignacio OT 18 1402 18 1402      Row Name                Wound 18 1047 Left arm incision    Wound - Properties Group Date first assessed: 18 [LT] Time first assessed:  [LT] Side: Left [LT] Location: arm [LT] Type: incision [LT] Recorded by:  [LT] Trisha Allan RN 18 1047 18 1047      User Key  (r) = Recorded By, (t) = Taken By, (c) = Cosigned By    Initials Name Effective Dates Discipline    YONIS RAMÍREZ  MARCO ANTONIO Ignacio 04/03/18 -  OT    LT Trisha Allan RN 06/16/16 -  Nurse        Wound 04/08/18 1047 Left arm incision (Active)   Dressing Appearance dry;intact 4/12/2018  9:50 AM   Base closed/resurfaced 4/11/2018  4:00 PM   Periwound pink 4/11/2018  4:00 PM   Drainage Characteristics/Odor serosanguineous 4/11/2018  4:00 PM   Drainage Amount scant 4/11/2018  4:00 PM   Care, Wound cleansed with;sterile normal saline 4/11/2018  4:00 PM   Dressing Care, Wound dressing changed;gauze;non-adherent;petroleum-based;gauze, dry;abdominal binder utilized 4/11/2018  4:00 PM             OT Recommendation and Plan  Planned Therapy Interventions (OT Eval): ROM/therapeutic exercise, strengthening exercise, adaptive equipment training  Plan of Care Review  Plan of Care Reviewed With: patient  Plan of Care Reviewed With: patient  Outcome Summary: OT to continue POC.         Outcome Measures     Row Name 04/10/18 1523 04/10/18 1500 04/09/18 1800       How much help from another person do you currently need...    Turning from your back to your side while in flat bed without using bedrails?  --  -- 2  -AG    Moving from lying on back to sitting on the side of a flat bed without bedrails?  --  -- 2  -AG    Moving to and from a bed to a chair (including a wheelchair)?  --  -- 2  -AG    Standing up from a chair using your arms (e.g., wheelchair, bedside chair)?  --  -- 1  -AG    Climbing 3-5 steps with a railing?  --  -- 1  -AG    To walk in hospital room?  --  -- 1  -AG    AM-PAC 6 Clicks Score  --  -- 9  -AG       How much help from another is currently needed...    Putting on and taking off regular lower body clothing? 1  -KR  --  --    Bathing (including washing, rinsing, and drying) 1  -KR  --  --    Toileting (which includes using toilet bed pan or urinal) 1  -KR  --  --    Putting on and taking off regular upper body clothing 1  -KR  --  --    Taking care of personal grooming (such as brushing teeth) 2  -KR  --  --    Eating meals 2   -KR  --  --    Score 8  -KR  --  --       Functional Assessment    Outcome Measure Options  -- AM-PAC 6 Clicks Daily Activity (OT)  -KR AM-PAC 6 Clicks Basic Mobility (PT)  -AG      User Key  (r) = Recorded By, (t) = Taken By, (c) = Cosigned By    Initials Name Provider Type    AG Nga Valera, PT Physical Therapist    YONIS Ignacio OT Occupational Therapist           Time Calculation:         Time Calculation- OT     Row Name 04/12/18 1402             Time Calculation- OT    Total Timed Code Minutes- OT 30 minute(s)  -KR        User Key  (r) = Recorded By, (t) = Taken By, (c) = Cosigned By    Initials Name Provider Type    YONIS Ignacio OT Occupational Therapist           Therapy Charges for Today     Code Description Service Date Service Provider Modifiers Qty    50160967821 HC OT THERAPEUTIC ACT EA 15 MIN 4/11/2018 Viraj Ignacio OT GO 2    34441952778 HC OT THERAPEUTIC ACT EA 15 MIN 4/12/2018 Viraj Ignacio OT GO 2          OT G-codes  OT Professional Judgement Used?: Yes  Functional Limitation: Self care  Self Care Current Status (): At least 80 percent but less than 100 percent impaired, limited or restricted  Self Care Goal Status (): At least 40 percent but less than 60 percent impaired, limited or restricted    Viraj Ignacio OT  4/12/2018

## 2018-04-13 VITALS
TEMPERATURE: 97.5 F | WEIGHT: 124.2 LBS | DIASTOLIC BLOOD PRESSURE: 52 MMHG | OXYGEN SATURATION: 99 % | RESPIRATION RATE: 20 BRPM | SYSTOLIC BLOOD PRESSURE: 133 MMHG | HEART RATE: 90 BPM | BODY MASS INDEX: 19.96 KG/M2 | HEIGHT: 66 IN

## 2018-04-13 LAB
ALBUMIN SERPL-MCNC: 3.2 G/DL (ref 3.4–4.8)
ALBUMIN/GLOB SERPL: 1.1 G/DL (ref 1.5–2.5)
ALP SERPL-CCNC: 73 U/L (ref 35–104)
ALT SERPL W P-5'-P-CCNC: 3 U/L (ref 10–36)
ANION GAP SERPL CALCULATED.3IONS-SCNC: 4.5 MMOL/L (ref 3.6–11.2)
AST SERPL-CCNC: 26 U/L (ref 10–30)
BASOPHILS # BLD AUTO: 0.06 10*3/MM3 (ref 0–0.3)
BASOPHILS NFR BLD AUTO: 0.7 % (ref 0–2)
BILIRUB SERPL-MCNC: 0.5 MG/DL (ref 0.2–1.8)
BUN BLD-MCNC: 22 MG/DL (ref 7–21)
BUN/CREAT SERPL: 41.5 (ref 7–25)
CALCIUM SPEC-SCNC: 9.5 MG/DL (ref 7.7–10)
CHLORIDE SERPL-SCNC: 102 MMOL/L (ref 99–112)
CO2 SERPL-SCNC: 30.5 MMOL/L (ref 24.3–31.9)
CREAT BLD-MCNC: 0.53 MG/DL (ref 0.43–1.29)
DEPRECATED RDW RBC AUTO: 47.2 FL (ref 37–54)
EOSINOPHIL # BLD AUTO: 0.35 10*3/MM3 (ref 0–0.7)
EOSINOPHIL NFR BLD AUTO: 4.2 % (ref 0–7)
ERYTHROCYTE [DISTWIDTH] IN BLOOD BY AUTOMATED COUNT: 13.8 % (ref 11.5–14.5)
GFR SERPL CREATININE-BSD FRML MDRD: 114 ML/MIN/1.73
GLOBULIN UR ELPH-MCNC: 2.9 GM/DL
GLUCOSE BLD-MCNC: 98 MG/DL (ref 70–110)
HCT VFR BLD AUTO: 30 % (ref 37–47)
HGB BLD-MCNC: 9.4 G/DL (ref 12–16)
IMM GRANULOCYTES # BLD: 0.04 10*3/MM3 (ref 0–0.03)
IMM GRANULOCYTES NFR BLD: 0.5 % (ref 0–0.5)
LYMPHOCYTES # BLD AUTO: 1.9 10*3/MM3 (ref 1–3)
LYMPHOCYTES NFR BLD AUTO: 22.5 % (ref 16–46)
MAGNESIUM SERPL-MCNC: 2 MG/DL (ref 1.7–2.6)
MCH RBC QN AUTO: 31.2 PG (ref 27–33)
MCHC RBC AUTO-ENTMCNC: 31.3 G/DL (ref 33–37)
MCV RBC AUTO: 99.7 FL (ref 80–94)
MONOCYTES # BLD AUTO: 0.77 10*3/MM3 (ref 0.1–0.9)
MONOCYTES NFR BLD AUTO: 9.1 % (ref 0–12)
NEUTROPHILS # BLD AUTO: 5.31 10*3/MM3 (ref 1.4–6.5)
NEUTROPHILS NFR BLD AUTO: 63 % (ref 40–75)
OSMOLALITY SERPL CALC.SUM OF ELEC: 277.1 MOSM/KG (ref 273–305)
PHOSPHATE SERPL-MCNC: 3.6 MG/DL (ref 2.7–4.5)
PLATELET # BLD AUTO: 507 10*3/MM3 (ref 130–400)
PMV BLD AUTO: 9.6 FL (ref 6–10)
POTASSIUM BLD-SCNC: 4.4 MMOL/L (ref 3.5–5.3)
PROT SERPL-MCNC: 6.1 G/DL (ref 6–8)
RBC # BLD AUTO: 3.01 10*6/MM3 (ref 4.2–5.4)
SODIUM BLD-SCNC: 137 MMOL/L (ref 135–153)
WBC NRBC COR # BLD: 8.43 10*3/MM3 (ref 4.5–12.5)

## 2018-04-13 PROCEDURE — 84100 ASSAY OF PHOSPHORUS: CPT | Performed by: PHYSICIAN ASSISTANT

## 2018-04-13 PROCEDURE — 97530 THERAPEUTIC ACTIVITIES: CPT

## 2018-04-13 PROCEDURE — 25010000002 ENOXAPARIN PER 10 MG: Performed by: ORTHOPAEDIC SURGERY

## 2018-04-13 PROCEDURE — 80053 COMPREHEN METABOLIC PANEL: CPT | Performed by: PHYSICIAN ASSISTANT

## 2018-04-13 PROCEDURE — 83735 ASSAY OF MAGNESIUM: CPT | Performed by: PHYSICIAN ASSISTANT

## 2018-04-13 PROCEDURE — 99239 HOSP IP/OBS DSCHRG MGMT >30: CPT | Performed by: PHYSICIAN ASSISTANT

## 2018-04-13 PROCEDURE — 94799 UNLISTED PULMONARY SVC/PX: CPT

## 2018-04-13 PROCEDURE — 85025 COMPLETE CBC W/AUTO DIFF WBC: CPT | Performed by: PHYSICIAN ASSISTANT

## 2018-04-13 RX ORDER — PSEUDOEPHEDRINE HCL 30 MG
200 TABLET ORAL 2 TIMES DAILY
Start: 2018-04-13

## 2018-04-13 RX ORDER — DOCUSATE SODIUM 100 MG/1
200 CAPSULE, LIQUID FILLED ORAL 2 TIMES DAILY
Status: DISCONTINUED | OUTPATIENT
Start: 2018-04-13 | End: 2018-04-13 | Stop reason: HOSPADM

## 2018-04-13 RX ORDER — HYDROCODONE BITARTRATE AND ACETAMINOPHEN 5; 325 MG/1; MG/1
1 TABLET ORAL EVERY 4 HOURS PRN
Qty: 30 TABLET | Refills: 0 | Status: SHIPPED | OUTPATIENT
Start: 2018-04-13 | End: 2018-04-18

## 2018-04-13 RX ORDER — FERROUS SULFATE 325(65) MG
325 TABLET ORAL
Qty: 30 TABLET | Refills: 0 | Status: ON HOLD | OUTPATIENT
Start: 2018-04-14 | End: 2021-03-13

## 2018-04-13 RX ADMIN — ENOXAPARIN SODIUM 40 MG: 40 INJECTION SUBCUTANEOUS at 08:04

## 2018-04-13 RX ADMIN — HYDROCODONE BITARTRATE AND ACETAMINOPHEN 1 TABLET: 5; 325 TABLET ORAL at 08:13

## 2018-04-13 RX ADMIN — HYDROCODONE BITARTRATE AND ACETAMINOPHEN 1 TABLET: 5; 325 TABLET ORAL at 13:18

## 2018-04-13 RX ADMIN — Medication 1 CAPSULE: at 08:05

## 2018-04-13 RX ADMIN — Medication 1 TABLET: at 08:05

## 2018-04-13 RX ADMIN — FERROUS SULFATE TAB 325 MG (65 MG ELEMENTAL FE) 325 MG: 325 (65 FE) TAB at 08:05

## 2018-04-13 RX ADMIN — CARBIDOPA AND LEVODOPA 1 TABLET: 25; 100 TABLET ORAL at 05:13

## 2018-04-13 RX ADMIN — DOCUSATE SODIUM 200 MG: 100 CAPSULE, LIQUID FILLED ORAL at 08:05

## 2018-04-13 RX ADMIN — POLYETHYLENE GLYCOL (3350) 17 G: 17 POWDER, FOR SOLUTION ORAL at 08:05

## 2018-04-13 RX ADMIN — CARBIDOPA AND LEVODOPA 1 TABLET: 25; 100 TABLET ORAL at 14:01

## 2018-04-13 RX ADMIN — HYDROCODONE BITARTRATE AND ACETAMINOPHEN 1 TABLET: 5; 325 TABLET ORAL at 00:18

## 2018-04-13 RX ADMIN — DOXYCYCLINE 100 MG: 100 CAPSULE ORAL at 08:05

## 2018-04-13 NOTE — DISCHARGE SUMMARY
[facility] HOSPITALIST MEDICINE DISCHARGE SUMMARY    Patient Identification:  Name:  Debra Resendiz  Age:  72 y.o.  Sex:  female  :  1945  MRN:  [id number]  Visit Number:  [id number]    Date of Admission: 2018  Date of Discharge:  2018     PCP: Leigh Ann Fu MD    ADMISSION DIAGNOSIS  -Traumatic left humeral neck fracture and right tibial plateau fracture status post fall   -Indeterminate troponin elevation   -Leukocytosis, felt to be reactive from stress fractures   -Former smoker     DISCHARGE DIAGNOSIS  -Closed left proximal humerus fracture status post ORIF, POD #4  -Acute blood loss anemia, stable   -Mild thrombocytosis  -Right tibial plateau fracture status post fall with recommendations for conservative therapy with nonweightbearing bearing status ×6 weeks  -Indeterminate troponin with occasional palpitations; troponin level normalized, EF normal   -Questionable atypical pneumonia, completed course of oral doxycycline   -Mild tachycardia, resolved  -Right leg and ankle pain with negative Doppler ultrasound studies  -Questionable early parkinsonism, started on Sinemet per neurology    -Constipation, postoperative   -Former smoker     CONSULTS   Dr. Aburto - Orthopedic Surgery   Dr. Marroquin - Cardiology   Dr. Stewart - Neurology     PROCEDURES PERFORMED  Procedure(s):  LEFT HUMERUS PROXIMAL OPEN REDUCTION INTERNAL FIXATION       HOSPITAL COURSE  Patient is a 72 y.o. female presented to [facility] complaining of left shoulder and right knee pain status post fall at home. Please see the admitting history and physical for further details.    The patient was found to have traumatic left humeral neck fracture and right medial tibial plateau fracture. Patient was admitted to the telemetry floor. On admission, patient did have gray zone troponin elevation, thus cardiology consultation was ordered. Patient did not have any cardiac history or history of coronary artery disease.  She did have significant family history of CAD. Transthoracic echocardiogram was performed, EF was normal and no abnormal findings were noted. She was cleared medically for surgery per cardiology with recommendation of stress testing in the outpatient setting upon discharge due to troponin elevation, family history, female sex, and age, as well as being a former smoker. Orthopedic surgery evaluated the patient and recommended conservative treatment of tibial plateau fracture, non-weight bearing for 6 weeks with knee immobilizer application. As for the left humeral fracture, patient was taken to operating room and underwent ORIF. Patient tolerated the procedure well. She underwent physical therapy daily as per orthopedic surgery recommendation with no complications. Per history, patient had cog wheeling and shuffling gait prior to admission and a masked face. Patient also had tremor of lower extremities concerning for early Parkinsonism. As such, neurology was consulted for which started the patient on Sinemet which has slightly improved tremor. Pre-operative CXR did reveal question of atypical pneumonia, blood cultures were negative, patient was treated empirically with 7 day course of PO doxycycline, last dose on day of admission.     Patient is discharged in stable condition. She is to be discharged to [facility] for further skilled nursing and rehabilitation therapy for her fractures.  She will be under the care of Dr. Fu. Will continue Lovenox SQ therapy for DVT prophylaxis as the patient is not ambulatory, repeat CBC in one week.       VITAL SIGNS:  1    04/11/18  0324 04/12/18  0328 04/13/18  0300   Weight: 53.5 kg (118 lb) 56.2 kg (124 lb) 56.3 kg (124 lb 3.2 oz)     Body mass index is 20.05 kg/m².    PHYSICAL EXAM:  Physical Exam   Constitutional: She is oriented to person, place, and time. She appears well-developed and well-nourished. No distress. Sitting up in bedside chair.    HENT:   Head: Normocephalic and atraumatic.   Mouth/Throat: Oropharynx is clear and moist.   Eyes: Conjunctivae and EOM are normal. Pupils are equal, round, and reactive to light.   Neck: Neck supple. No tracheal deviation present. No thyromegaly present.   Cardiovascular: Normal rate and regular rhythm.  Exam reveals no gallop and no friction rub.    No murmur heard.  Pulmonary/Chest: Breath sounds normal. No respiratory distress. She has no wheezes. She has no rales.   Cardiovascular and pulmonary examination may be difficult due to left sided immobilizer that is in place   Abdominal: Soft. Bowel sounds are normal. She exhibits no distension. There is no tenderness. There is no guarding.   Musculoskeletal: She exhibits no edema.        Right knee: She exhibits decreased range of motion.   Right knee immobilizer in place   Neurological: She is alert and oriented to person, place, and time. No cranial nerve deficit.   Tremor in right lower extremity noted on my exam; NVI LUE   Skin: Skin is warm and dry. No rash noted. No erythema.   Psychiatric: She has a normal mood and affect.       PERTINENT LAB RESULTS:     Results from last 7 days  Lab Units 04/13/18  0122 04/12/18  0114 04/11/18  0115  04/07/18  0208 04/06/18  1920   SODIUM mmol/L 137 137 138  < > 136 137   POTASSIUM mmol/L 4.4 4.4 4.0  < > 4.0 4.2   CHLORIDE mmol/L 102 102 102  < > 108 107   CO2 mmol/L 30.5 31.5 30.4  < > 19.9* 25.8   BUN mg/dL 22* 19 19  < > 18 17   CREATININE mg/dL 0.53 0.51 0.46  < > 0.64 0.64   CALCIUM mg/dL 9.5 9.3 9.2  < > 9.0 9.3   BILIRUBIN mg/dL 0.5  --   --   --  0.4 0.4   ALK PHOS U/L 73  --   --   --  80 86   ALT (SGPT) U/L 3*  --   --   --  19 19   AST (SGOT) U/L 26  --   --   --  26 27   GLUCOSE mg/dL 98 102 92  < > 113* 109   < > = values in this interval not displayed.    Results from last 7 days  Lab Units 04/13/18  0122 04/12/18  0114 04/11/18  0115   WBC 10*3/mm3 8.43 8.28 8.07   HEMOGLOBIN g/dL 9.4* 8.8* 8.7*    HEMATOCRIT % 30.0* 28.2* 27.6*   PLATELETS 10*3/mm3 507* 488* 404*       Results from last 7 days  Lab Units 04/07/18  0536 04/06/18  1920   INR  1.14* 1.04       Results from last 7 days  Lab Units 04/08/18  1510 04/07/18  0926 04/07/18  0208 04/06/18  2120   CK TOTAL U/L  --  112 112 97   TROPONIN I ng/mL 0.068* 0.131* 0.158* 0.101*   CK MB INDEX %  --  2.2 2.7 3.2*     Results from last 7 days  Lab Units 04/06/18  1925   COLOR UA  Yellow   GLUCOSE UA  Negative   KETONES UA  15 mg/dL (1+)*   LEUKOCYTES UA  Negative   PH, URINE  <=5.0   BILIRUBIN UA  Negative   UROBILINOGEN UA  0.2 E.U./dL       PERTINENT RADIOLOGY RESULTS  Imaging Results (all)     Procedure Component Value Units Date/Time    US Venous Doppler Lower Extremity Bilateral (duplex) [[id number]] Collected:  04/08/18 1912     Updated:  04/08/18 1915    Narrative:       FINDINGS:   There is patent spontaneous flow from the common femoral vein through  the posterior tibial veins.  There was no internal clot or area of noncompressibility.  Normal augmentation was elicited where applicable.    Impression:       No DVT in the lower extremities on today's exam.      This report was finalized on 4/8/2018 7:13 PM by Dr. Yang Liang MD.    FL Surgery Fluoro [[id number]] Collected:  04/08/18 1107     Updated:  04/08/18 1109    Narrative:       FINDINGS:   Intraoperative fluoroscopy images submitted from humeral neck fixation.    Impression:       As above.     This report was finalized on 4/8/2018 11:07 AM by Dr. Viraj Appiah MD.    CT Lower Extremity Right Without Contrast [[id number]] Collected:  04/08/18 0851     Updated:  04/08/18 0855    Narrative:       FINDINGS:  Multipart nondisplaced and nondepressed medial tibial plateau fracture.  Articular surface involvement with 1 mm fracture plane diastases.  Fracture extends to both the posterior and anterior cortical margins.  There is diffuse underlying osteoporosis noted. Lipohemarthrosis  identified. No  additional fractures identified.    Impression:       Multipart nondisplaced medial tibial plateau fracture as characterized  above. Lipohemarthrosis noted. Advanced osteoporosis also evident.    This report was finalized on 4/8/2018 8:53 AM by Dr. Viraj Appiah MD.    XR Ankle 3+ View Right [[id number]] Collected:  04/08/18 0850     Updated:  04/08/18 0853    Narrative:       FINDINGS:   Mild soft tissue swelling. No displaced fracture identified. No  dislocation. No joint effusion.        Impression:       Mild soft tissues line with no evidence of displaced fracture.     This report was finalized on 4/8/2018 8:51 AM by Dr. Viraj Appiah MD.    CT Upper Extremity Left Without Contrast [[id number]] Collected:  04/07/18 0737     Updated:  04/07/18 0740    Narrative:       FINDINGS:  Comminuted fracture is again noted of the left humeral neck. Humeral  head remains well located. There is minimal anterior displacement of the  distal fracture fragments in relation to the proximal humeral head  fracture fragments.  No dislocation.  Degenerative changes noted including calcific tendinosis and AC joint  arthropathy.  Localized soft tissue edema and swelling is noted.  Partial imaging of the left lung demonstrates centrilobular nodularity  throughout most consistent with atypical pneumonia.    Impression:       1. Minimally displaced comminuted left humeral neck fracture as detailed  above.  2. Probable atypical pneumonia seen of the partially imaged left lung.     This report was finalized on 4/7/2018 7:38 AM by Dr. Viraj Appiah MD.    XR Chest 1 View [[id number]] Collected:  04/07/18 0735     Updated:  04/07/18 0738    Narrative:       FINDINGS:   Chronic appearing interstitial lung changes.   Heart and mediastinal contours are unremarkable.   No pneumothorax.   No pleural effusion.   Left humerus neck fracture again noted.    Impression:       Chronic appearing interstitial lung changes. Otherwise no  acute  cardiopulmonary findings.     This report was finalized on 4/7/2018 7:36 AM by Dr. Viraj Appiah MD.    XR Shoulder 2+ View Left [[id number]] Collected:  04/06/18 1523     Updated:  04/06/18 1623    Narrative:       FINDINGS:   Left humerus neck fracture. Angulation without significant displacement.    Impression:       Angulated but nondisplaced left humeral neck fracture.     This report was finalized on 4/6/2018 3:24 PM by Dr. Viraj Appiah MD.    XR Knee 3 View Right [[id number]] Collected:  04/06/18 1524     Updated:  04/06/18 1623    Narrative:       FINDINGS:   Fat fluid level suprapatellar bursa indicates lipohemarthrosis. Medial  tibial plateau fracture not displaced. No dislocation.        Impression:       Nondisplaced medial tibial plateau fracture with associated  lipohemarthrosis.     This report was finalized on 4/6/2018 3:24 PM by Dr. Viraj Appiah MD.      Transthoracic Echocardiogram 4/7/2018        DISCHARGE DISPOSITION     Skilled Nursing Facility (SD - External): [facility] under the care of Dr. Fu, via EMS per medical necessity.     CONDITION AT DISCHARGE  Stable      CODE STATUS WHILE INPATIENT   Full Code    DISCHARGE MEDICATIONS:   Debra Resendiz   Home Medication Instructions [place]:[id number]    Printed on:04/13/18 1231   Medication Information                      carbidopa-levodopa (SINEMET)  MG per tablet  Take 1 tablet by mouth Every 8 (Eight) Hours for 30 days.             diphenhydrAMINE (BENADRYL) 25 mg capsule  Take 25 mg by mouth 2 (Two) Times a Day As Needed for Itching, Allergies or Sleep.             docusate sodium 100 MG capsule  Take 200 mg by mouth 2 (Two) Times a Day.             enoxaparin (LOVENOX) 40 MG/0.4ML solution syringe  Inject 0.4 mL under the skin Daily for 7 days.             ferrous sulfate 325 (65 FE) MG tablet  Take 1 tablet by mouth Daily With Breakfast.             HYDROcodone-acetaminophen (NORCO) 5-325 MG per tablet  Take 1  tablet by mouth Every 4 (Four) Hours As Needed for Moderate Pain  for up to 5 days.             multivitamin (DAILY RED) tablet tablet  Take 1 tablet by mouth Daily.             polyethylene glycol (MIRALAX) pack packet  Take 17 g by mouth Daily.               DISCHARGE Diet:  Diet Instructions     Diet: Regular       Discharge Diet:  Regular        DISCHARGE ACTIVITY:  Activity Instructions     Other Instructions (Specify)       As per therapy orders        DISCHARGE APPOINTMENTS:    Additional Instructions for the Follow-ups that You Need to Schedule     Discharge Follow-up with Specialty: Dr. Stewart; 2 Weeks    As directed      Specialty:  Dr. Stewart    Follow Up:  2 Weeks    Follow Up Details:  2 weeks after discharge, newly diagnosed Parkinsons         Discharge Follow-up with Specified Provider: CBC; 1 Week    As directed      To:  CBC    Follow Up:  1 Week    Follow Up Details:  While on lovenox therapy           Follow-up Information     No Known Provider .    Contact information:  [facility] [id number]                 Additional Instructions for the Follow-ups that You Need to Schedule     Discharge Follow-up with Specialty: Dr. Stewart; 2 Weeks    As directed      Specialty:  Dr. Stewart    Follow Up:  2 Weeks    Follow Up Details:  2 weeks after discharge, newly diagnosed Parkinsons         Discharge Follow-up with Specified Provider: CBC; 1 Week    As directed      To:  CBC    Follow Up:  1 Week    Follow Up Details:  While on lovenox therapy             TEST  RESULTS PENDING AT DISCHARGE  None     Zeinab Coates PA-C  04/13/18  12:31 PM    Please note that this discharge summary required more than 30 minutes to complete.    Please send a copy of this dictation to the following providers:  Leigh Ann Fu MD

## 2018-04-13 NOTE — PLAN OF CARE
Problem: Patient Care Overview  Goal: Plan of Care Review  Outcome: Ongoing (interventions implemented as appropriate)   04/13/18 1500   Coping/Psychosocial   Plan of Care Reviewed With patient   Coping/Psychosocial   Patient Agreement with Plan of Care agrees   Plan of Care Review   Progress improving

## 2018-04-13 NOTE — PLAN OF CARE
Problem: Fall Risk (Adult)  Goal: Identify Related Risk Factors and Signs and Symptoms  Outcome: Ongoing (interventions implemented as appropriate)    Goal: Absence of Fall  Outcome: Ongoing (interventions implemented as appropriate)      Problem: Pain, Acute (Adult)  Goal: Acceptable Pain Control/Comfort Level  Outcome: Ongoing (interventions implemented as appropriate)      Problem: Fracture Orthopaedic (Adult)  Goal: Signs and Symptoms of Listed Potential Problems Will be Absent, Minimized or Managed (Fracture Orthopaedic)  Outcome: Ongoing (interventions implemented as appropriate)      Problem: Patient Care Overview  Goal: Plan of Care Review  Outcome: Ongoing (interventions implemented as appropriate)    Goal: Discharge Needs Assessment  Outcome: Ongoing (interventions implemented as appropriate)      Problem: Patient Care Overview  Goal: Plan of Care Review  Outcome: Ongoing (interventions implemented as appropriate)    Goal: Individualization and Mutuality  Outcome: Ongoing (interventions implemented as appropriate)    Goal: Discharge Needs Assessment  Outcome: Ongoing (interventions implemented as appropriate)    Goal: Interprofessional Rounds/Family Conf  Outcome: Ongoing (interventions implemented as appropriate)      Problem: Skin Injury Risk (Adult)  Goal: Identify Related Risk Factors and Signs and Symptoms  Outcome: Ongoing (interventions implemented as appropriate)    Goal: Skin Health and Integrity  Outcome: Ongoing (interventions implemented as appropriate)      Problem: Functional Mobility Impairment (IRF) (Adult)  Goal: Optimal/Safe Level of [place] with Mobility  Outcome: Ongoing (interventions implemented as appropriate)

## 2018-04-13 NOTE — PROGRESS NOTES
Discharge Planning Assessment   [place]     Patient Name: Debra Resendiz  MRN: [id number]  Today's Date: 4/13/2018    Admit Date: 4/6/2018          Discharge Needs Assessment    No documentation.           Discharge Plan     Row Name 04/13/18 1232       Plan    Final Discharge Disposition Code 03 - skilled nursing facility (SNF)    Final Note Pt to be discharged to [facility] on this date.  Facility aware and agreeable per Rhiannon.  Pt and son aware and agreeable.  Pt to be transported via [facility].  No further intervention needed.        Destination     No service coordination in this encounter.      Durable Medical Equipment     No service coordination in this encounter.      Dialysis/Infusion     No service coordination in this encounter.      Home Medical Care     No service coordination in this encounter.      Social Care     No service coordination in this encounter.        Expected Discharge Date and Time     Expected Discharge Date Expected Discharge Time    Apr 13, 2018               Demographic Summary    No documentation.           Functional Status    No documentation.           Psychosocial    No documentation.           Abuse/Neglect    No documentation.           Legal    No documentation.           Substance Abuse    No documentation.           Patient Forms    No documentation.         Meg Fang

## 2018-04-13 NOTE — PLAN OF CARE
Problem: Fall Risk (Adult)  Goal: Identify Related Risk Factors and Signs and Symptoms  Outcome: Ongoing (interventions implemented as appropriate)    Goal: Absence of Fall  Outcome: Ongoing (interventions implemented as appropriate)      Problem: Pain, Acute (Adult)  Goal: Identify Related Risk Factors and Signs and Symptoms  Outcome: Ongoing (interventions implemented as appropriate)    Goal: Acceptable Pain Control/Comfort Level  Outcome: Ongoing (interventions implemented as appropriate)      Problem: Fracture Orthopaedic (Adult)  Goal: Signs and Symptoms of Listed Potential Problems Will be Absent, Minimized or Managed (Fracture Orthopaedic)  Outcome: Ongoing (interventions implemented as appropriate)      Problem: Skin Injury Risk (Adult)  Goal: Identify Related Risk Factors and Signs and Symptoms  Outcome: Ongoing (interventions implemented as appropriate)    Goal: Skin Health and Integrity  Outcome: Ongoing (interventions implemented as appropriate)

## 2018-04-13 NOTE — THERAPY TREATMENT NOTE
Acute Care - Physical Therapy Treatment Note   [place]     Patient Name: Debra Resendiz  : 1945  MRN: [id number]  Today's Date: 2018  Onset of Illness/Injury or Date of Surgery: 18  Date of Referral to PT: 18  Referring Physician: Dr. Aburto    Admit Date: 2018    Visit Dx:    ICD-10-CM ICD-9-CM   1. Closed displaced comminuted fracture of shaft of left humerus, initial encounter S42.352A 812.21   2. Right medial tibial plateau fracture, closed, initial encounter S82.131A 823.00     Patient Active Problem List   Diagnosis   • Closed displaced comminuted fracture of shaft of left humerus       Therapy Treatment          Rehabilitation Treatment Summary     Row Name 18 1400             Treatment Time/Intention    Discipline physical therapist  -BC      Document Type therapy note (daily note)   treated BID  -BC      Mode of Treatment individual therapy;physical therapy  -BC      Therapy Frequency (PT Clinical Impression) --   1-2 times/ day per priority policy  -BC      Patient Effort fair  -BC      Existing Precautions/Restrictions brace worn when out of bed;fall;non-weight bearing;oxygen therapy device and L/min  -BC      Recorded by [BC] Phuong Campoverde, PT 18 1452 18 1452      Row Name 18 1400             Cognitive Assessment/Intervention- PT/OT    Orientation Status (Cognition) oriented x 3  -BC      Follows Commands (Cognition) does not follow one step commands;delayed response/completion;increased processing time needed;physical/tactile prompts required  -BC      Recorded by [BC] Phuong Campoverde, PT 18 1452 18 1452      Row Name 18 1400             Mobility Assessment/Intervention    Extremity Weight-bearing Status left upper extremity;right lower extremity  -BC      Left Upper Extremity (Weight-bearing Status) non weight-bearing (NWB)  -BC      Right Upper Extremity (Weight-bearing Status) full weight-bearing (FWB)  -BC      Left Lower  Extremity (Weight-bearing Status) weight-bearing as tolerated (WBAT)  -BC      Right Lower Extremity (Weight-bearing Status) non weight-bearing (NWB)  -BC      Recorded by [BC] Phuong Campoverde, PT 04/13/18 1452 04/13/18 1452      Row Name 04/13/18 1400             Bed Mobility Assessment/Treatment    Bed Mobility Assessment/Treatment scooting/bridging;supine-sit;sit-supine  -BC      Rolling Right [place] (Bed Mobility) verbal cues;nonverbal cues (demo/gesture);maximum assist (25% patient effort);2 person assist  -BC      Scooting/Bridging [place] (Bed Mobility) verbal cues;nonverbal cues (demo/gesture);maximum assist (25% patient effort);2 person assist  -BC      Supine-Sit [place] (Bed Mobility) verbal cues;nonverbal cues (demo/gesture);maximum assist (25% patient effort);2 person assist  -BC      Sit-Supine [place] (Bed Mobility) verbal cues;nonverbal cues (demo/gesture);maximum assist (25% patient effort);2 person assist  -BC      Bed Mobility, Safety Issues cognitive deficits limit understanding;decreased use of arms for pushing/pulling;decreased use of legs for bridging/pushing;impaired trunk control for bed mobility  -BC      Assistive Device (Bed Mobility) draw sheet  -BC      Recorded by [BC] Phuong Campoverde, PT 04/13/18 1452 04/13/18 1452      Row Name 04/13/18 1400             Transfer Assessment/Treatment    Transfer Assessment/Treatment bed-chair transfer;chair-bed transfer;stand pivot/stand step transfer  -BC      Bed-Chair [place] (Transfers) verbal cues;nonverbal cues (demo/gesture);maximum assist (25% patient effort);2 person assist  -BC      Chair-Bed [place] (Transfers) verbal cues;nonverbal cues (demo/gesture);maximum assist (25% patient effort);2 person assist  -BC      Assistive Device (Bed-Chair Transfers) --   stand-pivot  -BC      Recorded by [BC] Phuong Campoverde, PT 04/13/18 1452 04/13/18 1452      Row Name 04/13/18 1400             Chair-Bed Transfer     Assistive Device (Chair-Bed Transfers) --   stand-pivot  -BC      Recorded by [BC] Phuong Campoverde, PT 04/13/18 1452 04/13/18 1452      Row Name 04/13/18 1400             Stand Pivot/Stand Step Transfer    Stand Pivot/Stand Step [place] verbal cues;nonverbal cues (demo/gesture);maximum assist (25% patient effort);2 person assist  -BC      Recorded by [BC] Phuong Campoverde, PT 04/13/18 1452 04/13/18 1452      Row Name 04/13/18 1400             Gait/Stairs Assessment/Training    [place] Level (Gait) unable to assess  -BC      Recorded by [BC] Phuong Campoverde, PT 04/13/18 1452 04/13/18 1452      Row Name 04/13/18 1400             Pain Scale: Numbers Pre/Post-Treatment    Pain Location - Side Bilateral  -BC      Pain Location - Orientation other (see comments)   L UE> R LE pain  -BC      Recorded by [BC] Phuong Campoverde, PT 04/13/18 1452 04/13/18 1452      Row Name 04/13/18 1400             Pain Scale: FACES Pre/Post-Treatment    Pain: FACES Scale, Pretreatment 4-->hurts little more  -BC      Pain: FACES Scale, Post-Treatment 6-->hurts even more  -BC      Recorded by [BC] Phuong Campoverde, PT 04/13/18 1452 04/13/18 1452      Row Name 04/13/18 1400             Sensory Assessment/Intervention    Sensory General Assessment other (see comments)  -BC      Recorded by [BC] Phuong Campoverde, PT 04/13/18 1452 04/13/18 1452      Row Name                Wound 04/08/18 1047 Left arm incision    Wound - Properties Group Date first assessed: 04/08/18 [LT] Time first assessed: 1047 [LT] Side: Left [LT] Location: arm [LT] Type: incision [LT] Recorded by:  [LT] Trisha Allan RN 04/08/18 1047 04/08/18 1047      User Key  (r) = Recorded By, (t) = Taken By, (c) = Cosigned By    Initials Name Effective Dates Discipline    BC Phuong Campoverde, PT 03/14/16 -  PT    LT Trisha Allan RN 06/16/16 -  Nurse          Wound 04/08/18 1047 Left arm incision (Active)   Dressing Appearance no drainage 4/12/2018  6:06 PM    Closure [place] 4/12/2018  8:40 PM   Base dry;clean 4/13/2018  8:14 AM   Periwound pink 4/12/2018  8:40 PM   Periwound Temperature cool 4/12/2018  8:40 PM   Care, Wound cleansed with;sterile normal saline 4/12/2018  6:06 PM   Dressing Care, Wound dressing changed 4/12/2018  6:06 PM             Physical Therapy Education     Title: PT OT SLP Therapies (Done)     Topic: Physical Therapy (Done)     Point: Mobility training (Done)    Learning Progress Summary     Learner Status Readiness Method Response Comment Documented by    Patient Done Acceptance E VU  BC 04/13/18 1459     Done Eager E VU  BC 04/13/18 1453     Done Acceptance E,D VU,NR  AG 04/12/18 1806     Done Acceptance E VU  RF 04/11/18 1652     Done Acceptance E VU  AM 04/11/18 1611     Done Acceptance E VU  RF 04/10/18 1543     Done Acceptance E,D VU,NR  AG 04/09/18 1841    Family Done Acceptance E,D VU,NR  AG 04/12/18 1806     Done Acceptance E,D VU,NR  AG 04/09/18 1841          Point: Home exercise program (Done)    Learning Progress Summary     Learner Status Readiness Method Response Comment Documented by    Patient Done Acceptance E VU  BC 04/13/18 1459     Done Eager E VU  BC 04/13/18 1453     Done Acceptance E,D VU,NR  AG 04/12/18 1806     Done Acceptance E VU  RF 04/11/18 1652     Done Acceptance E VU  AM 04/11/18 1611     Done Acceptance E VU  RF 04/10/18 1543     Done Acceptance E,D VU,NR  AG 04/09/18 1841    Family Done Acceptance E,D VU,NR  AG 04/12/18 1806     Done Acceptance E,D VU,NR  AG 04/09/18 1841          Point: Body mechanics (Done)    Learning Progress Summary     Learner Status Readiness Method Response Comment Documented by    Patient Done Acceptance E VU  BC 04/13/18 1459     Done Eager E VU  BC 04/13/18 1453     Done Acceptance E,D VU,NR  AG 04/12/18 1806     Done Acceptance E VU  RF 04/11/18 1652     Done Acceptance E VU  AM 04/11/18 1611     Done Acceptance E VU  RF 04/10/18 1543     Done Acceptance E,D VU,NR  AG 04/09/18 1406     Family Done Acceptance E,D VU,NR  AG 04/12/18 1806     Done Acceptance E,D VU,NR  AG 04/09/18 1841          Point: Precautions (Done)    Learning Progress Summary     Learner Status Readiness Method Response Comment Documented by    Patient Done Acceptance E VU  BC 04/13/18 1459     Done Eager E VU  BC 04/13/18 1453     Done Acceptance E,D VU,NR  AG 04/12/18 1806     Done Acceptance E VU   04/11/18 1652     Done Acceptance E VU  AM 04/11/18 1611     Done Acceptance E VU   04/10/18 1543     Done Acceptance E,D VU,NR  AG 04/09/18 1841    Family Done Acceptance E,D VU,NR  AG 04/12/18 1806     Done Acceptance E,D VU,NR  AG 04/09/18 1841                      User Key     Initials Effective Dates Name Provider Type Discipline     06/16/16 -  Smitha Glass, RN Registered Nurse Nurse    BC 03/14/16 -  Phuong Campoverde, PT Physical Therapist PT     04/03/18 -  Nga Valera, PT Physical Therapist PT     03/07/18 -  Marta Mckeon, PTA Physical Therapy Assistant PT                    PT Recommendation and Plan  Therapy Frequency (PT Clinical Impression):  (1-2 times/ day per priority policy)  Plan of Care Reviewed With: patient  Progress: improving          Outcome Measures     Row Name 04/13/18 1500 04/10/18 1523          How much help from another person do you currently need...    Turning from your back to your side while in flat bed without using bedrails? 1  -BC  --     Moving from lying on back to sitting on the side of a flat bed without bedrails? 2  -BC  --     Moving to and from a bed to a chair (including a wheelchair)? 2  -BC  --     Standing up from a chair using your arms (e.g., wheelchair, bedside chair)? 1  -BC  --     Climbing 3-5 steps with a railing? 1  -BC  --     To walk in hospital room? 1  -BC  --     AM-PAC 6 Clicks Score 8  -BC  --        How much help from another is currently needed...    Putting on and taking off regular lower body clothing?  -- 1  -KR     Bathing (including  washing, rinsing, and drying)  -- 1  -KR     Toileting (which includes using toilet bed pan or urinal)  -- 1  -KR     Putting on and taking off regular upper body clothing  -- 1  -KR     Taking care of personal grooming (such as brushing teeth)  -- 2  -KR     Eating meals  -- 2  -KR     Score  -- 8  -KR        Functional Assessment    Outcome Measure Options AM-PAC 6 Clicks Basic Mobility (PT)  -BC  --       User Key  (r) = Recorded By, (t) = Taken By, (c) = Cosigned By    Initials Name Provider Type    BC Phuong Campoverde, PT Physical Therapist    KR Viraj Igncaio, OT Occupational Therapist           Time Calculation:         PT Charges     Row Name 04/13/18 1504             Time Calculation    Start Time --   30  -BC         Time Calculation- PT    TCU Minutes- PT 30 min  -BC        User Key  (r) = Recorded By, (t) = Taken By, (c) = Cosigned By    Initials Name Provider Type    BC Phuong Campoverde, PT Physical Therapist          Therapy Charges for Today     Code Description Service Date Service Provider Modifiers Qty    40098303820 HC PT THER SUPP EA 15 MIN 4/13/2018 Phuong Campoverde, PT GP 2    26364281970 HC PT THERAPEUTIC ACT EA 15 MIN 4/13/2018 Phuong Campoverde, PT GP 2          PT G-Codes  Outcome Measure Options: AM-PAC 6 Clicks Basic Mobility (PT)  Score: 9  Functional Limitation: Mobility: Walking and moving around  Mobility: Walking and Moving Around Current Status (): At least 60 percent but less than 80 percent impaired, limited or restricted  Mobility: Walking and Moving Around Goal Status (): At least 20 percent but less than 40 percent impaired, limited or restricted    Phuong Campoverde, PT  4/13/2018

## 2021-03-13 ENCOUNTER — APPOINTMENT (OUTPATIENT)
Dept: GENERAL RADIOLOGY | Facility: HOSPITAL | Age: 76
End: 2021-03-13

## 2021-03-13 ENCOUNTER — HOSPITAL ENCOUNTER (INPATIENT)
Facility: HOSPITAL | Age: 76
LOS: 6 days | Discharge: NURSING, REHAB OR LONG-TERM CARE | End: 2021-03-19
Attending: INTERNAL MEDICINE | Admitting: ORTHOPAEDIC SURGERY

## 2021-03-13 DIAGNOSIS — S42.402B ELBOW FRACTURE, LEFT, OPEN, INITIAL ENCOUNTER: Primary | ICD-10-CM

## 2021-03-13 PROBLEM — Z87.891 FORMER SMOKER: Chronic | Status: ACTIVE | Noted: 2021-03-13

## 2021-03-13 LAB
25(OH)D3 SERPL-MCNC: 20.5 NG/ML (ref 30–100)
ABO GROUP BLD: NORMAL
ALBUMIN SERPL-MCNC: 3.22 G/DL (ref 3.5–5.2)
ALBUMIN/GLOB SERPL: 1 G/DL
ALP SERPL-CCNC: 100 U/L (ref 39–117)
ALT SERPL W P-5'-P-CCNC: 15 U/L (ref 1–33)
ANION GAP SERPL CALCULATED.3IONS-SCNC: 8.1 MMOL/L (ref 5–15)
AST SERPL-CCNC: 18 U/L (ref 1–32)
BACTERIA UR QL AUTO: ABNORMAL /HPF
BASOPHILS # BLD AUTO: 0.07 10*3/MM3 (ref 0–0.2)
BASOPHILS NFR BLD AUTO: 0.8 % (ref 0–1.5)
BILIRUB SERPL-MCNC: 0.5 MG/DL (ref 0–1.2)
BILIRUB UR QL STRIP: NEGATIVE
BLD GP AB SCN SERPL QL: NEGATIVE
BUN SERPL-MCNC: 15 MG/DL (ref 8–23)
BUN/CREAT SERPL: 28.3 (ref 7–25)
CALCIUM SPEC-SCNC: 9.2 MG/DL (ref 8.6–10.5)
CHLORIDE SERPL-SCNC: 104 MMOL/L (ref 98–107)
CLARITY UR: ABNORMAL
CO2 SERPL-SCNC: 23.9 MMOL/L (ref 22–29)
COLOR UR: YELLOW
CREAT SERPL-MCNC: 0.53 MG/DL (ref 0.57–1)
DEPRECATED RDW RBC AUTO: 49.7 FL (ref 37–54)
EOSINOPHIL # BLD AUTO: 0.32 10*3/MM3 (ref 0–0.4)
EOSINOPHIL NFR BLD AUTO: 3.7 % (ref 0.3–6.2)
ERYTHROCYTE [DISTWIDTH] IN BLOOD BY AUTOMATED COUNT: 13.6 % (ref 12.3–15.4)
FOLATE SERPL-MCNC: 12.5 NG/ML (ref 4.78–24.2)
GFR SERPL CREATININE-BSD FRML MDRD: 113 ML/MIN/1.73
GLOBULIN UR ELPH-MCNC: 3.2 GM/DL
GLUCOSE SERPL-MCNC: 94 MG/DL (ref 65–99)
GLUCOSE UR STRIP-MCNC: NEGATIVE MG/DL
HCT VFR BLD AUTO: 36.2 % (ref 34–46.6)
HGB BLD-MCNC: 11.6 G/DL (ref 12–15.9)
HGB UR QL STRIP.AUTO: ABNORMAL
HYALINE CASTS UR QL AUTO: ABNORMAL /LPF
IMM GRANULOCYTES # BLD AUTO: 0.03 10*3/MM3 (ref 0–0.05)
IMM GRANULOCYTES NFR BLD AUTO: 0.3 % (ref 0–0.5)
KETONES UR QL STRIP: NEGATIVE
LEUKOCYTE ESTERASE UR QL STRIP.AUTO: ABNORMAL
LYMPHOCYTES # BLD AUTO: 1.78 10*3/MM3 (ref 0.7–3.1)
LYMPHOCYTES NFR BLD AUTO: 20.5 % (ref 19.6–45.3)
MAGNESIUM SERPL-MCNC: 1.9 MG/DL (ref 1.6–2.4)
MCH RBC QN AUTO: 31.5 PG (ref 26.6–33)
MCHC RBC AUTO-ENTMCNC: 32 G/DL (ref 31.5–35.7)
MCV RBC AUTO: 98.4 FL (ref 79–97)
MONOCYTES # BLD AUTO: 0.63 10*3/MM3 (ref 0.1–0.9)
MONOCYTES NFR BLD AUTO: 7.2 % (ref 5–12)
NEUTROPHILS NFR BLD AUTO: 5.86 10*3/MM3 (ref 1.7–7)
NEUTROPHILS NFR BLD AUTO: 67.5 % (ref 42.7–76)
NITRITE UR QL STRIP: POSITIVE
NRBC BLD AUTO-RTO: 0 /100 WBC (ref 0–0.2)
PH UR STRIP.AUTO: 7.5 [PH] (ref 5–8)
PHOSPHATE SERPL-MCNC: 2.3 MG/DL (ref 2.5–4.5)
PLATELET # BLD AUTO: 309 10*3/MM3 (ref 140–450)
PMV BLD AUTO: 9.6 FL (ref 6–12)
POTASSIUM SERPL-SCNC: 4.3 MMOL/L (ref 3.5–5.2)
PROT SERPL-MCNC: 6.4 G/DL (ref 6–8.5)
PROT UR QL STRIP: NEGATIVE
QT INTERVAL: 362 MS
QTC INTERVAL: 427 MS
RBC # BLD AUTO: 3.68 10*6/MM3 (ref 3.77–5.28)
RBC # UR: ABNORMAL /HPF
REF LAB TEST METHOD: ABNORMAL
RH BLD: POSITIVE
SODIUM SERPL-SCNC: 136 MMOL/L (ref 136–145)
SP GR UR STRIP: 1.02 (ref 1–1.03)
SQUAMOUS #/AREA URNS HPF: ABNORMAL /HPF
T&S EXPIRATION DATE: NORMAL
UROBILINOGEN UR QL STRIP: ABNORMAL
VIT B12 BLD-MCNC: 880 PG/ML (ref 211–946)
WBC # BLD AUTO: 8.69 10*3/MM3 (ref 3.4–10.8)
WBC UR QL AUTO: ABNORMAL /HPF

## 2021-03-13 PROCEDURE — 87040 BLOOD CULTURE FOR BACTERIA: CPT | Performed by: PHYSICIAN ASSISTANT

## 2021-03-13 PROCEDURE — 93005 ELECTROCARDIOGRAM TRACING: CPT | Performed by: PHYSICIAN ASSISTANT

## 2021-03-13 PROCEDURE — 73070 X-RAY EXAM OF ELBOW: CPT

## 2021-03-13 PROCEDURE — 86901 BLOOD TYPING SEROLOGIC RH(D): CPT | Performed by: PHYSICIAN ASSISTANT

## 2021-03-13 PROCEDURE — 99221 1ST HOSP IP/OBS SF/LOW 40: CPT | Performed by: PHYSICIAN ASSISTANT

## 2021-03-13 PROCEDURE — 86850 RBC ANTIBODY SCREEN: CPT | Performed by: PHYSICIAN ASSISTANT

## 2021-03-13 PROCEDURE — 25010000002 VANCOMYCIN 1 G RECONSTITUTED SOLUTION: Performed by: INTERNAL MEDICINE

## 2021-03-13 PROCEDURE — 86900 BLOOD TYPING SEROLOGIC ABO: CPT | Performed by: PHYSICIAN ASSISTANT

## 2021-03-13 PROCEDURE — 83735 ASSAY OF MAGNESIUM: CPT | Performed by: PHYSICIAN ASSISTANT

## 2021-03-13 PROCEDURE — 93010 ELECTROCARDIOGRAM REPORT: CPT | Performed by: INTERNAL MEDICINE

## 2021-03-13 PROCEDURE — 71045 X-RAY EXAM CHEST 1 VIEW: CPT | Performed by: RADIOLOGY

## 2021-03-13 PROCEDURE — 82306 VITAMIN D 25 HYDROXY: CPT | Performed by: PHYSICIAN ASSISTANT

## 2021-03-13 PROCEDURE — 82607 VITAMIN B-12: CPT | Performed by: PHYSICIAN ASSISTANT

## 2021-03-13 PROCEDURE — 81001 URINALYSIS AUTO W/SCOPE: CPT | Performed by: PHYSICIAN ASSISTANT

## 2021-03-13 PROCEDURE — 87186 SC STD MICRODIL/AGAR DIL: CPT | Performed by: PHYSICIAN ASSISTANT

## 2021-03-13 PROCEDURE — 25010000002 MORPHINE PER 10 MG: Performed by: PHYSICIAN ASSISTANT

## 2021-03-13 PROCEDURE — 80053 COMPREHEN METABOLIC PANEL: CPT | Performed by: PHYSICIAN ASSISTANT

## 2021-03-13 PROCEDURE — 25010000002 MAGNESIUM SULFATE IN D5W 1G/100ML (PREMIX) 1-5 GM/100ML-% SOLUTION: Performed by: INTERNAL MEDICINE

## 2021-03-13 PROCEDURE — 87088 URINE BACTERIA CULTURE: CPT | Performed by: PHYSICIAN ASSISTANT

## 2021-03-13 PROCEDURE — 85025 COMPLETE CBC W/AUTO DIFF WBC: CPT | Performed by: PHYSICIAN ASSISTANT

## 2021-03-13 PROCEDURE — 71045 X-RAY EXAM CHEST 1 VIEW: CPT

## 2021-03-13 PROCEDURE — 84100 ASSAY OF PHOSPHORUS: CPT | Performed by: PHYSICIAN ASSISTANT

## 2021-03-13 PROCEDURE — 82746 ASSAY OF FOLIC ACID SERUM: CPT | Performed by: PHYSICIAN ASSISTANT

## 2021-03-13 PROCEDURE — 87086 URINE CULTURE/COLONY COUNT: CPT | Performed by: PHYSICIAN ASSISTANT

## 2021-03-13 RX ORDER — SENNA PLUS 8.6 MG/1
1 TABLET ORAL 2 TIMES DAILY
COMMUNITY

## 2021-03-13 RX ORDER — BISACODYL 10 MG
10 SUPPOSITORY, RECTAL RECTAL DAILY PRN
COMMUNITY

## 2021-03-13 RX ORDER — SENNOSIDES 8.6 MG
650 CAPSULE ORAL EVERY 8 HOURS PRN
COMMUNITY

## 2021-03-13 RX ORDER — SODIUM CHLORIDE 0.9 % (FLUSH) 0.9 %
10 SYRINGE (ML) INJECTION EVERY 12 HOURS SCHEDULED
Status: DISCONTINUED | OUTPATIENT
Start: 2021-03-13 | End: 2021-03-19 | Stop reason: HOSPADM

## 2021-03-13 RX ORDER — L.ACID,PARA/B.BIFIDUM/S.THERM 8B CELL
1 CAPSULE ORAL DAILY
Status: DISCONTINUED | OUTPATIENT
Start: 2021-03-13 | End: 2021-03-19 | Stop reason: HOSPADM

## 2021-03-13 RX ORDER — CITALOPRAM 10 MG/1
10 TABLET ORAL
COMMUNITY

## 2021-03-13 RX ORDER — PANTOPRAZOLE SODIUM 40 MG/1
40 TABLET, DELAYED RELEASE ORAL
Status: DISCONTINUED | OUTPATIENT
Start: 2021-03-13 | End: 2021-03-19 | Stop reason: HOSPADM

## 2021-03-13 RX ORDER — DOCUSATE SODIUM 100 MG/1
100 CAPSULE, LIQUID FILLED ORAL 2 TIMES DAILY
Status: DISCONTINUED | OUTPATIENT
Start: 2021-03-13 | End: 2021-03-19 | Stop reason: HOSPADM

## 2021-03-13 RX ORDER — FOLIC ACID/VIT B COMPLEX AND C 0.5 MG
1 TABLET ORAL DAILY
COMMUNITY

## 2021-03-13 RX ORDER — MORPHINE SULFATE 2 MG/ML
1 INJECTION, SOLUTION INTRAMUSCULAR; INTRAVENOUS EVERY 4 HOURS PRN
Status: DISCONTINUED | OUTPATIENT
Start: 2021-03-13 | End: 2021-03-19 | Stop reason: HOSPADM

## 2021-03-13 RX ORDER — BUSPIRONE HYDROCHLORIDE 15 MG/1
15 TABLET ORAL 3 TIMES DAILY
COMMUNITY

## 2021-03-13 RX ORDER — HYDROCODONE BITARTRATE AND ACETAMINOPHEN 5; 325 MG/1; MG/1
1 TABLET ORAL EVERY 6 HOURS PRN
Status: ON HOLD | COMMUNITY
End: 2021-03-19 | Stop reason: SDUPTHER

## 2021-03-13 RX ORDER — LACTULOSE 10 G/15ML
20 SOLUTION ORAL NIGHTLY
Status: DISCONTINUED | OUTPATIENT
Start: 2021-03-13 | End: 2021-03-19 | Stop reason: HOSPADM

## 2021-03-13 RX ORDER — POLYETHYLENE GLYCOL 3350 17 G/17G
17 POWDER, FOR SOLUTION ORAL DAILY
Status: DISCONTINUED | OUTPATIENT
Start: 2021-03-14 | End: 2021-03-19 | Stop reason: HOSPADM

## 2021-03-13 RX ORDER — MAGNESIUM SULFATE 1 G/100ML
1 INJECTION INTRAVENOUS ONCE
Status: DISCONTINUED | OUTPATIENT
Start: 2021-03-13 | End: 2021-03-13

## 2021-03-13 RX ORDER — BENZTROPINE MESYLATE 1 MG/1
1 TABLET ORAL 2 TIMES DAILY
Status: DISCONTINUED | OUTPATIENT
Start: 2021-03-13 | End: 2021-03-19 | Stop reason: HOSPADM

## 2021-03-13 RX ORDER — CITALOPRAM 20 MG/1
10 TABLET ORAL NIGHTLY
Status: DISCONTINUED | OUTPATIENT
Start: 2021-03-13 | End: 2021-03-19 | Stop reason: HOSPADM

## 2021-03-13 RX ORDER — CHOLECALCIFEROL (VITAMIN D3) 125 MCG
10 CAPSULE ORAL NIGHTLY PRN
Status: DISCONTINUED | OUTPATIENT
Start: 2021-03-13 | End: 2021-03-19 | Stop reason: HOSPADM

## 2021-03-13 RX ORDER — MAGNESIUM SULFATE HEPTAHYDRATE 40 MG/ML
2 INJECTION, SOLUTION INTRAVENOUS AS NEEDED
Status: DISCONTINUED | OUTPATIENT
Start: 2021-03-13 | End: 2021-03-19 | Stop reason: HOSPADM

## 2021-03-13 RX ORDER — FERROUS SULFATE 325(65) MG
325 TABLET ORAL 2 TIMES DAILY
COMMUNITY

## 2021-03-13 RX ORDER — RENO CAPS 100; 1.5; 1.7; 20; 10; 1; 150; 5; 6 MG/1; MG/1; MG/1; MG/1; MG/1; MG/1; UG/1; MG/1; UG/1
1 CAPSULE ORAL DAILY
Status: DISCONTINUED | OUTPATIENT
Start: 2021-03-14 | End: 2021-03-19 | Stop reason: HOSPADM

## 2021-03-13 RX ORDER — HYDROCODONE BITARTRATE AND ACETAMINOPHEN 5; 325 MG/1; MG/1
1 TABLET ORAL EVERY 6 HOURS PRN
Status: CANCELLED | OUTPATIENT
Start: 2021-03-13

## 2021-03-13 RX ORDER — LACTULOSE 10 G/15ML
20 SOLUTION ORAL
COMMUNITY

## 2021-03-13 RX ORDER — ACETAMINOPHEN 325 MG/1
650 TABLET ORAL EVERY 6 HOURS PRN
Status: DISCONTINUED | OUTPATIENT
Start: 2021-03-13 | End: 2021-03-19 | Stop reason: HOSPADM

## 2021-03-13 RX ORDER — POTASSIUM CHLORIDE 7.45 MG/ML
10 INJECTION INTRAVENOUS
Status: DISCONTINUED | OUTPATIENT
Start: 2021-03-13 | End: 2021-03-19 | Stop reason: HOSPADM

## 2021-03-13 RX ORDER — PHENOL 1.4 %
10 AEROSOL, SPRAY (ML) MUCOUS MEMBRANE NIGHTLY PRN
Status: ON HOLD | COMMUNITY
End: 2021-03-19 | Stop reason: SDUPTHER

## 2021-03-13 RX ORDER — BENZTROPINE MESYLATE 1 MG/1
1 TABLET ORAL 2 TIMES DAILY
COMMUNITY

## 2021-03-13 RX ORDER — NALOXONE HCL 0.4 MG/ML
0.4 VIAL (ML) INJECTION
Status: DISCONTINUED | OUTPATIENT
Start: 2021-03-13 | End: 2021-03-19 | Stop reason: HOSPADM

## 2021-03-13 RX ORDER — MAGNESIUM SULFATE 1 G/100ML
1 INJECTION INTRAVENOUS AS NEEDED
Status: DISCONTINUED | OUTPATIENT
Start: 2021-03-13 | End: 2021-03-19 | Stop reason: HOSPADM

## 2021-03-13 RX ORDER — ACETAMINOPHEN 325 MG/1
650 TABLET ORAL EVERY 8 HOURS PRN
Status: CANCELLED | OUTPATIENT
Start: 2021-03-13

## 2021-03-13 RX ORDER — BISACODYL 10 MG
10 SUPPOSITORY, RECTAL RECTAL DAILY PRN
Status: DISCONTINUED | OUTPATIENT
Start: 2021-03-13 | End: 2021-03-19 | Stop reason: HOSPADM

## 2021-03-13 RX ORDER — SENNA PLUS 8.6 MG/1
1 TABLET ORAL 2 TIMES DAILY
Status: DISCONTINUED | OUTPATIENT
Start: 2021-03-13 | End: 2021-03-19 | Stop reason: HOSPADM

## 2021-03-13 RX ORDER — FERROUS SULFATE 325(65) MG
325 TABLET ORAL 2 TIMES DAILY
Status: DISCONTINUED | OUTPATIENT
Start: 2021-03-13 | End: 2021-03-19 | Stop reason: HOSPADM

## 2021-03-13 RX ORDER — MAGNESIUM SULFATE 1 G/100ML
1 INJECTION INTRAVENOUS ONCE
Status: COMPLETED | OUTPATIENT
Start: 2021-03-13 | End: 2021-03-13

## 2021-03-13 RX ORDER — NITROGLYCERIN 0.4 MG/1
0.4 TABLET SUBLINGUAL
Status: DISCONTINUED | OUTPATIENT
Start: 2021-03-13 | End: 2021-03-19 | Stop reason: HOSPADM

## 2021-03-13 RX ORDER — SODIUM CHLORIDE 0.9 % (FLUSH) 0.9 %
10 SYRINGE (ML) INJECTION AS NEEDED
Status: DISCONTINUED | OUTPATIENT
Start: 2021-03-13 | End: 2021-03-19 | Stop reason: HOSPADM

## 2021-03-13 RX ADMIN — CITALOPRAM HYDROBROMIDE 10 MG: 20 TABLET ORAL at 23:31

## 2021-03-13 RX ADMIN — BUSPIRONE HYDROCHLORIDE 15 MG: 10 TABLET ORAL at 23:32

## 2021-03-13 RX ADMIN — METRONIDAZOLE 500 MG: 500 INJECTION, SOLUTION INTRAVENOUS at 06:48

## 2021-03-13 RX ADMIN — SODIUM CHLORIDE, PRESERVATIVE FREE 10 ML: 5 INJECTION INTRAVENOUS at 21:04

## 2021-03-13 RX ADMIN — FERROUS SULFATE TAB 325 MG (65 MG ELEMENTAL FE) 325 MG: 325 (65 FE) TAB at 23:31

## 2021-03-13 RX ADMIN — MORPHINE SULFATE 1 MG: 2 INJECTION, SOLUTION INTRAMUSCULAR; INTRAVENOUS at 10:06

## 2021-03-13 RX ADMIN — SODIUM CHLORIDE 2 G: 900 INJECTION INTRAVENOUS at 07:24

## 2021-03-13 RX ADMIN — METRONIDAZOLE 500 MG: 500 INJECTION, SOLUTION INTRAVENOUS at 21:03

## 2021-03-13 RX ADMIN — DOCUSATE SODIUM 100 MG: 100 CAPSULE, LIQUID FILLED ORAL at 23:31

## 2021-03-13 RX ADMIN — SODIUM CHLORIDE 2 G: 900 INJECTION INTRAVENOUS at 23:31

## 2021-03-13 RX ADMIN — MAGNESIUM SULFATE HEPTAHYDRATE 1 G: 1 INJECTION, SOLUTION INTRAVENOUS at 08:57

## 2021-03-13 RX ADMIN — VANCOMYCIN HYDROCHLORIDE 1000 MG: 1 INJECTION, POWDER, LYOPHILIZED, FOR SOLUTION INTRAVENOUS at 08:57

## 2021-03-13 RX ADMIN — METRONIDAZOLE 500 MG: 500 INJECTION, SOLUTION INTRAVENOUS at 15:07

## 2021-03-13 RX ADMIN — SENNOSIDES 1 TABLET: 8.6 TABLET, FILM COATED ORAL at 23:31

## 2021-03-13 RX ADMIN — MORPHINE SULFATE 1 MG: 2 INJECTION, SOLUTION INTRAMUSCULAR; INTRAVENOUS at 04:31

## 2021-03-13 RX ADMIN — SODIUM CHLORIDE 2 G: 900 INJECTION INTRAVENOUS at 17:10

## 2021-03-13 RX ADMIN — SODIUM CHLORIDE, PRESERVATIVE FREE 10 ML: 5 INJECTION INTRAVENOUS at 08:58

## 2021-03-13 RX ADMIN — BENZTROPINE MESYLATE 1 MG: 1 TABLET ORAL at 23:31

## 2021-03-13 NOTE — PLAN OF CARE
Goal Outcome Evaluation:   Admin prn morphine x1 this shift. Received mag replacement. Pt to have surgery on Monday for elbow fracture

## 2021-03-13 NOTE — PROGRESS NOTES
Patient lives at a nursing home facility with Parkinson disease and had a fall that resulted in an open left olecranon fracture.  She was seen at an outside orthopedic surgery clinic however due to it being open fracture she was placed in a splint and was sent to the ER for further management.  Orthopedic surgery has seen her however patient has been on Eliquis 2.5 mg twice daily for DVT prophylaxis at nursing home and last dose administered is unclear therefore Dr. Chacko decided to manage conservatively over the weekend and allow for anticoagulation to wear off and planning on corrective surgery on Monday.  In the meantime we will continue to manage pain with medications mobilize out of bed to chair with assistance of nursing.  Plan on n.p.o. after midnight Sunday night.

## 2021-03-13 NOTE — PROGRESS NOTES
Pharmacokinetics Service Note:    Pharmacy was consulted to dose vancomycin for Ms. Resendiz to treat her bone/joint infection and SSTI. She received a 1 gm dose this AM at 0857. Based on an estimated CrCl of 45 mL/min and patient specific demographics, will continue with 1 gm q18h to target an AUC in the range of 400-600 mg/L.hr. Will continue to follow and obtain a level as needed to guide further dosing.      Thank you,   Shola Hickman, PharmD  Pharmacy Resident  3/13/2021  12:05 EST

## 2021-03-13 NOTE — CONSULTS
Consults    Patient Identification:  Name:  Debra Resendiz  Age:  75 y.o.  Sex:  female  :  1945  MRN:  [id number]  Visit Number:  [id number]  Primary care provider:  Leigh Ann Fu MD    History of presenting illness:75 y.o. female consulted to orthopedics for left elbow fracture.  Pt states he fell Tuesday and hurt her arm and was evaluated at [facility]  And to to orthopedics in [place] which was sent back to the ED to be transferred for open wound of the left elbow. Pt was placed into a splint and sent to [facility] for further treatment planning.  She denies any prior trouble with here elbow in the past and does not have numbness or tingling. Pt is a resident at [facility].  She is on chronic anticoagulants    ---------------------------------------------------------------------------------------------------------------------  Review of Systems   Constitutional: Negative for fever.   HEENT: Negative for headache blurred vision hearing loss or sore throat.     Respiratory: Negative for shortness of breath.    Cardiovascular: Negative for chest pain.   Gastrointestinal: Negative for diarrhea.    Genitourinary: Negative for hematuria.    Musculoskeletal: Positive for left elbow pain.   Skin: Negative for rash.    Neurological: Negative for loss of consciousness, syncope and headaches.   Psychiatric/Behavioral: Negative for depression.  Endocrine:  Negative for polydipsia     ---------------------------------------------------------------------------------------------------------------------   Past History:  No family history on file.  No past medical history on file.  Past Surgical History:   Procedure Laterality Date   • HIP SURGERY     • HYSTERECTOMY     • ORIF HUMERUS FRACTURE Left 2018    Procedure: LEFT HUMERUS PROXIMAL OPEN REDUCTION INTERNAL FIXATION;  Surgeon: Dani Aburto MD;  [facility]: [facility];  Service: Orthopedics   • TUBAL ABDOMINAL LIGATION       Social  History     Socioeconomic History   • Marital status:      Spouse name: Not on file   • Number of children: Not on file   • Years of education: Not on file   • Highest education level: Not on file   Tobacco Use   • Smoking status: Former Smoker   Substance and Sexual Activity   • Alcohol use: No   • Drug use: No     ---------------------------------------------------------------------------------------------------------------------   Allergies:  Penicillins  ---------------------------------------------------------------------------------------------------------------------   Prior to Admission Medications     Prescriptions Last Dose Informant Patient Reported? Taking?    acetaminophen (TYLENOL) 650 MG 8 hr tablet Past Week  Yes Yes    Take 650 mg by mouth Every 8 (Eight) Hours As Needed for Mild Pain .    apixaban (ELIQUIS) 2.5 MG tablet tablet 3/12/2021  Yes Yes    Take 2.5 mg by mouth 2 (Two) Times a Day.    B Complex-C-Folic Acid (B-Plex) tablet 3/12/2021  Yes Yes    Take 1 tablet by mouth Daily.    benztropine (COGENTIN) 1 MG tablet 3/12/2021  Yes Yes    Take 1 mg by mouth 2 (Two) Times a Day.    bisacodyl (Bisacodyl Laxative) 10 MG suppository Past Month  Yes Yes    Insert 10 mg into the rectum Daily As Needed for Constipation.    busPIRone (BUSPAR) 15 MG tablet 3/12/2021  Yes Yes    Take 15 mg by mouth 3 (Three) Times a Day.    docusate sodium 100 MG capsule 3/12/2021  No Yes    Take 200 mg by mouth 2 (Two) Times a Day.    ferrous sulfate 325 (65 FE) MG tablet 3/12/2021  Yes Yes    Take 325 mg by mouth 2 (two) times a day.    HYDROcodone-acetaminophen (NORCO) 5-325 MG per tablet 3/12/2021  Yes Yes    Take 1 tablet by mouth Every 6 (Six) Hours As Needed.    magnesium hydroxide (MILK OF MAGNESIA) 400 MG/5ML suspension Past Month  Yes Yes    Take 30 mL by mouth Daily As Needed for Constipation.    Melatonin 10 MG tablet Past Month  Yes Yes    Take 10 mg by mouth At Night As Needed (Sleep).    polyethylene  glycol (MIRALAX) pack packet 3/12/2021  No Yes    Take 17 g by mouth Daily.    senna (senna) 8.6 MG tablet 3/12/2021  Yes Yes    Take 1 tablet by mouth 2 (Two) Times a Day.    citalopram (CeleXA) 10 MG tablet 3/11/2021  Yes No    Take 10 mg by mouth every night at bedtime.    lactulose (CHRONULAC) 10 GM/15ML solution 3/11/2021  Yes No    Take 20 g by mouth every night at bedtime.        [facility] Meds:  aztreonam, 2 g, Intravenous, Q8H  lactobacillus acidophilus, 1 capsule, Oral, Daily  metroNIDAZOLE, 500 mg, Intravenous, Q8H  pantoprazole, 40 mg, Oral, Q AM  sodium chloride, 10 mL, Intravenous, Q12H      Pharmacy to dose vancomycin,       ---------------------------------------------------------------------------------------------------------------------   Vital Signs:  Temp:  [97.6 °F (36.4 °C)-97.7 °F (36.5 °C)] 97.6 °F (36.4 °C)  Heart Rate:  [78-85] 78  Resp:  [18] 18  BP: (105-129)/(56-58) 105/56      03/13/21  0300 03/13/21  0500   Weight: 45.7 kg (100 lb 11.2 oz) 45.7 kg (100 lb 11.2 oz) (admit weight pt not on floor 24 hrs)     Body mass index is 16.25 kg/m².  ---------------------------------------------------------------------------------------------------------------------   Physical exam:  Constitutional:  Well-developed and well-nourished.  No respiratory distress.      HENT:  Head: Normocephalic and atraumatic.  Mouth:  Moist mucous membranes.    Eyes:  Conjunctivae and EOM are normal.  Pupils are equal, round, and reactive to light.  No scleral icterus.  Neck:  Neck supple.  No JVD present.    Cardiovascular:  Normal rate, regular rhythm and normal heart sounds with no murmur.  Pulmonary/Chest:  No respiratory distress,   Musculoskeletal:  Left long arm splint in place, active rom of the left hand and digits    Neurological:  Alert and oriented to person, place, and time.  No cranial nerve deficit.  No tongue deviation.  No facial droop.  No slurred speech.   Skin:  Skin is warm and dry.  No rash  noted.  No pallor.   Psychiatric:  Normal mood and affect.  Behavior is normal.  Judgment and thought content normal.   Peripheral vascular:  No edema     ---------------------------------------------------------------------------------------------------------------------   .  ---------------------------------------------------------------------------------------------------------------------   Results from last 7 days   Lab Units 03/13/21  0322   WBC 10*3/mm3 8.69   HEMOGLOBIN g/dL 11.6*   HEMATOCRIT % 36.2   MCV fL 98.4*   MCHC g/dL 32.0   PLATELETS 10*3/mm3 309         Results from last 7 days   Lab Units 03/13/21  0322   SODIUM mmol/L 136   POTASSIUM mmol/L 4.3   MAGNESIUM mg/dL 1.9   CHLORIDE mmol/L 104   CO2 mmol/L 23.9   BUN mg/dL 15   CREATININE mg/dL 0.53*   EGFR IF NONAFRICN AM mL/min/1.73 113   CALCIUM mg/dL 9.2   GLUCOSE mg/dL 94   ALBUMIN g/dL 3.22*   BILIRUBIN mg/dL 0.5   ALK PHOS U/L 100   AST (SGOT) U/L 18   ALT (SGPT) U/L 15   Estimated Creatinine Clearance: 44.5 mL/min (A) (by C-G formula based on SCr of 0.53 mg/dL (L)).  No results found for: AMMONIA          Lab Results   Component Value Date    HGBA1C 5.20 04/06/2018     Lab Results   Component Value Date    TSH 2.243 04/08/2018     No results found for: PREGTESTUR, PREGSERUM, HCG, HCGQUANT  Pain Management Panel    There is no flowsheet data to display.       No results found for: BLOODCX  No results found for: URINECX  No results found for: WOUNDCX  No results found for: STOOLCX      ---------------------------------------------------------------------------------------------------------------------   Imaging Results (Last 7 Days)     Procedure Component Value Units Date/Time    XR Elbow 2 View Left [[id number]] Collected: 03/13/21 0545     Updated: 03/13/21 0548    Narrative:      CR Elbow 2 Vws LT    INDICATION:   Evaluate olecranon fracture    COMPARISON:   None available    FINDINGS:   2 views of the left elbow.  There is a fracture visible  in the dorsal cortex of the proximal ulna. That fracture is about 3 cm from the proximal end of the bone. It is not possible on this study to see the entire fracture of the probably passes through  the bone into the olecranon fossa  No bone erosion or destruction.  No foreign body.      Impression:      There is a defect in the dorsal cortex in the proximal ulna. Most likely there is a complete fracture through into the olecranon fossa but is not clearly visible on these images    Signer Name: Dane Molina MD   Signed: 3/13/2021 5:45 AM   Workstation Name: [id number]    [facility]    XR Chest 1 View [[id number]] Resulted: 03/13/21 0537     Updated: 03/13/21 0540        ----------------------------------------------------------------------------------------------------------------------  Assessment and Plan: Left elbow olecranon fracture - With this type of fracture to ensure proper function and optimal healing a surgical intervention would be needed.  Will plan for left elbow orif with plate and screws on Monday 3- by Dr. Stoddard.  She is to hold her anticoagulants.  She is to be NPO at midnight the night prior.    Thank you for the consult.    Agustin Manzanares, APRN  03/13/21  10:35 EST

## 2021-03-13 NOTE — H&P
[facility] Medicine Services  HISTORY & PHYSICAL    Patient Identification:  Name:  Debra Resendiz  Age:  75 y.o.  Sex:  female  :  1945  MRN:  [id number]   Visit Number:  [id number]  Admit Date: 3/13/2021   Primary Care Physician:  Leigh Ann Fu MD     Subjective     Chief complaint:   Transfer from [facility]     History of presenting illness:   Patient is a 75 y.o. female nursing home resident at the [facility] and rehabilitation [facility] with past medical history significant for Parkinson's disease, frequent falls, and generalized anxiety disorder that was transferred to this facility from [facility] ED for evaluation of left open olecranon fracture.  Patient is sitting up in bed, no acute distress noted.  She is alert and oriented to self and time but not place.  Patient is unable to provide significant history of presenting illness due to underlying Parkinson's.  She does not remember events leading to fall.  Per review of [facility] records, patient was seen at the [facility] on 3/10/2021.  Per report she had filled out the nursing home, patient rarely ambulates and utilizes wheelchair.  A splint was placed to the left upper extremity at the ED.  She was discharged from the emergency department and was scheduled for outpatient follow-up with Ortho PDX surgery.  Patient was sent back to the emergency department on 3/12/2021 from orthopedic surgeons office for concerns of open fracture with recommendation to transfer to higher level care facility.  Per review of ED documentation from 3/12/2021 at [facility], splint was removed and there was sanguinous drainage over the splint from the posterior aspect of the elbow.  There is skin abrasion noted over the proximal ulna that was small.  There is also a skin tear noted over the posterior lateral aspect of the elbow.  Of concern was a 10 x 10 mm wound over the lateral aspect of the proximal  ulna which was actively bleeding.  This particular wound was closed in proximity to the fracture site.  There is swelling over the posterior aspect of the elbow as well with no forearm swelling.  Patient had palpable pulses, good range of motion of all fingers.  She had tenderness over the olecranon process.     Review of [facility] Records and [facility] Records   Left elbow x-ray [facility] 3/10/2021 2340  Impression: Mild osteopenia, mildly displaced fracture involving the base of the olecranon process region of the articular surface, significant associated soft tissue edema posterior to the proximal aspect of the ulna.  Mild joint effusion with elevation of the anterior posterior fat pad.  Left wrist x-ray [facility] 3/10/2021 2221  No acute bony abnormality, diffuse osteopenia noted.  CMP:Sodium 141, potassium 4.2, chloride 105, CO2 30.2, anion gap 5.8, BUN 22, creatinine 0.71, EGFR greater than 60, BUN/creatinine ratio 31, glucose 87, calcium 9.2, AST 21, ALT 28, alkaline phosphatase 104, albumin 3.0.  Urinalysis: Kathy cloudy appearance, negative ketones, positive nitrite, 1+ leukocyte esterase, 6-10 WBC, 1+ squamous epithelial cells, 4+ bacteria    CBC: White blood cell count 10.2, hemoglobin 11.7, hematocrit 36.0, MCV 97.8, platelets 337  COVID-19 screening negative  Influenza A and B negative   RSV negative     Home medications:  Acetaminophen 650 mg every 12 hour  Apixaban 2.5 mg twice daily  Benztropine 1 mg p.o. twice daily  Buspirone 15 mg p.o. twice daily  Citalopram 10 mg p.o. daily  Ferrous sulfate 325 mg delayed release p.o. daily  Hydrocodone 5 mg-acetaminophen 325 mg 1 tablet p.o. every 6 hours as needed  Lactulose 20 g / 30 mL p.o. nightly  Melatonin 10 mg p.o. at bedtime as needed    Per chart review at this facility, patient was admitted to this facility on 4/6/2020 1834/13/2018 where she was treated for traumatic left humeral fracture and right tibial plateau fracture status  post mechanical fall at home.  Patient was also noted to have indeterminate troponin elevation.  Cardiology evaluated the patient, she did not have any cardiac history or family history of coronary artery disease.  A transthoracic echocardiogram performed revealing preserved ejection fraction with no abnormalities.  Patient is cleared by cardiology for surgery.  Patient underwent ORIF for left humeral fracture.  For the tibial plateau fracture, she was treated with nonweightbearing status for 6 weeks and physical therapy.  During admission, it was felt that patient may have early parkinsonism due to history of cogwheeling, shuffling gait prior to admission, and a masked face.  She was started on Sinemet.  Patient's preoperative chest x-ray did reveal question of atypical pneumonia, she was treated with a course of p.o. doxycycline.  At discharge, patient was in stable condition.  She was discharged to the [facility] and rehab for skilled nursing and rehabilitation therapy for fractures.    On my exam, patient is sitting up in bed, no acute distress noted.  Patient is pleasant.  She does report some pain in her left elbow.  She denies any numbness or tingling, no paresthesias.  She has good range of motion of her digits on the left hand.  She denies any chest pain or dyspnea.  She denies any abdominal pain, nausea, vomiting or change in bowel movements.  She denies any fevers or chills.  No cough.  She denies any urinary symptoms.  No lower extremity edema.  She does state that she typically uses a wheelchair at the nursing, will only transfer from bed to chair.  She does not ambulate very often.  She does report having frequent falls.  She is on Eliquis per review of nursing home records, however I cannot find a diagnosis as to why she is chronically anticoagulated.  It appears her last dose was on 3/12/2021. I did try to contact patient's Son Diaz Resendiz at [phone] with no success. Per transfer  records, patient's son was made aware of transfer and authorized.     Patient has been admitted to the telemetry floor for further evaluation and treatment.     Present during exam: [place] ENGLISH   ---------------------------------------------------------------------------------------------------------------------   Review of Systems   Constitutional: Negative for activity change, appetite change, chills, diaphoresis, fatigue and fever.   HENT: Negative for congestion, postnasal drip, rhinorrhea, sinus pain, sore throat and trouble swallowing.    Eyes: Negative for discharge and visual disturbance.   Respiratory: Negative for cough, chest tightness, shortness of breath and wheezing.    Cardiovascular: Negative for chest pain, palpitations and leg swelling.   Gastrointestinal: Negative for abdominal pain, constipation, diarrhea, nausea and vomiting.   Endocrine: Negative for cold intolerance and heat intolerance.   Genitourinary: Negative for decreased urine volume, dysuria, frequency and urgency.   Musculoskeletal: Negative for arthralgias, gait problem and myalgias.        Left elbow pain    Skin: Negative for color change, rash and wound.   Allergic/Immunologic: Negative for environmental allergies and immunocompromised state.   Neurological: Negative for dizziness, syncope, weakness, light-headedness and headaches.   Hematological: Negative for adenopathy. Does not bruise/bleed easily.   Psychiatric/Behavioral: Negative for confusion and decreased concentration. The patient is not nervous/anxious.       ---------------------------------------------------------------------------------------------------------------------   No past medical history on file.  Past Surgical History:   Procedure Laterality Date   • HIP SURGERY     • ORIF HUMERUS FRACTURE Left 4/8/2018    Procedure: LEFT HUMERUS PROXIMAL OPEN REDUCTION INTERNAL FIXATION;  Surgeon: Dani Aburto MD;  Location: [facility];  Service: Orthopedics     No  family history on file.  Social History     Socioeconomic History   • Marital status:      Spouse name: Not on file   • Number of children: Not on file   • Years of education: Not on file   • Highest education level: Not on file   Tobacco Use   • Smoking status: Former Smoker   Substance and Sexual Activity   • Alcohol use: No   • Drug use: No     ---------------------------------------------------------------------------------------------------------------------   Allergies:  Penicillins  ---------------------------------------------------------------------------------------------------------------------   Medications below are reported home medications pulling from within the system; at this time, these medications have not been reconciled unless otherwise specified and are in the verification process for further verifcation as current home medications.    Prior to Admission Medications     Prescriptions Last Dose Informant Patient Reported? Taking?    diphenhydrAMINE (BENADRYL) 25 mg capsule  Self Yes No    Take 25 mg by mouth 2 (Two) Times a Day As Needed for Itching, Allergies or Sleep.    docusate sodium 100 MG capsule   No No    Take 200 mg by mouth 2 (Two) Times a Day.    ferrous sulfate 325 (65 FE) MG tablet   No No    Take 1 tablet by mouth Daily With Breakfast.    multivitamin (DAILY RED) tablet tablet  Self Yes No    Take 1 tablet by mouth Daily.    polyethylene glycol (MIRALAX) pack packet   No No    Take 17 g by mouth Daily.        ---------------------------------------------------------------------------------------------------------------------    Objective     Hospital Scheduled Meds:  pantoprazole, 40 mg, Oral, Q AM  sodium chloride, 10 mL, Intravenous, Q12H           Current listed hospital scheduled medications may not yet reflect those currently placed in orders that are signed and held, awaiting patient's arrival to floor/unit.     ---------------------------------------------------------------------------------------------------------------------   Vital Signs:  Temp:  [97.7 °F (36.5 °C)] 97.7 °F (36.5 °C)  Heart Rate:  [85] 85  Resp:  [18] 18  BP: (129)/(58) 129/58  Mean Arterial Pressure (Non-Invasive) for the past 24 hrs (Last 3 readings):   Noninvasive MAP (mmHg)   03/13/21 0300 79     SpO2 Percentage    03/13/21 0300   SpO2: 92%     SpO2:  [92 %] 92 % on room air      Body mass index is 20.05 kg/m².  Wt Readings from Last 3 Encounters:   04/13/18 56.3 kg (124 lb 3.2 oz)       ---------------------------------------------------------------------------------------------------------------------   Physical Exam:  Physical Exam  Nursing note reviewed.   Constitutional:       General: She is awake. She is not in acute distress.     Appearance: She is well-developed. She is not toxic-appearing.      Comments: Elderly, pleasant, sitting up in bed, no acute distress.  Thin.   HENT:      Head: Normocephalic and atraumatic.      Right Ear: External ear normal.      Left Ear: External ear normal.      Nose: Nose normal.      Mouth/Throat:      Mouth: Mucous membranes are moist.      Pharynx: Oropharynx is clear.   Eyes:      Extraocular Movements: Extraocular movements intact.      Conjunctiva/sclera: Conjunctivae normal.      Pupils: Pupils are equal, round, and reactive to light.   Cardiovascular:      Rate and Rhythm: Normal rate and regular rhythm.      Pulses:           Dorsalis pedis pulses are 2+ on the right side and 2+ on the left side.        Posterior tibial pulses are 2+ on the right side and 2+ on the left side.      Heart sounds: Normal heart sounds. No murmur. No friction rub. No gallop.    Pulmonary:      Effort: Pulmonary effort is normal. No tachypnea, accessory muscle usage or respiratory distress.      Breath sounds: Normal breath sounds and air entry. No wheezing, rhonchi or rales.      Comments: Patient speaks in full  sentences without any dyspnea.  On room air.  Chest:      Chest wall: No tenderness.   Abdominal:      General: Bowel sounds are normal. There is no distension.      Palpations: Abdomen is soft. There is no hepatomegaly or splenomegaly.      Tenderness: There is no abdominal tenderness. There is no guarding or rebound.      Hernia: No hernia is present.   Genitourinary:     Comments: No gaxiola catheter in place.  Musculoskeletal:      Left elbow: Deformity (Bulky occlusive cast in place) present.      Cervical back: Normal range of motion and neck supple.      Right lower leg: No edema.      Left lower leg: No edema.      Comments: Distal pulses intact bilateral upper extremities.  Cap refill intact.  No decrease in sensation or strength.  Patient has good range of motion of digits on left hand.   Skin:     General: Skin is warm and dry.      Capillary Refill: Capillary refill takes less than 2 seconds.      Findings: No abscess, erythema, lesion or wound.   Neurological:      General: No focal deficit present.      Mental Status: She is alert. She is disoriented.      Cranial Nerves: Cranial nerves are intact.      Sensory: Sensation is intact.      Motor: Weakness (Generalized, secondary to underlying dementia and advanced age.  No lateralizing weakness noted.) and atrophy (Global, age-related/chronic illness related) present.      Comments: Awake and alert.  Oriented to self and year, otherwise disoriented.  Follows commands. Answers questions appropriately. Moves all extremities equally. Strength and sensation intact. No focal neuro deficit on exam.   Psychiatric:         Attention and Perception: Attention normal.         Mood and Affect: Mood normal. Affect is flat.         Speech: Speech is delayed (Slightly).         Behavior: Behavior is cooperative.         Cognition and Memory: Memory is impaired.        ---------------------------------------------------------------------------------------------------------------------  EKG:  Pending cardiology read. Per my interpretation, NSR with HR 84 BPM, QTc WNL at 427 m/s, no acute ST-T wave abnormality, no ST elevation. Await final cardiology read.     Telemetry:    Sinus 70s    I have personally reviewed the EKG/Telemetry strip  ---------------------------------------------------------------------------------------------------------------------             Results from last 7 days   Lab Units 03/13/21  0322   WBC 10*3/mm3 8.69   HEMOGLOBIN g/dL 11.6*   HEMATOCRIT % 36.2   MCV fL 98.4*   MCHC g/dL 32.0   PLATELETS 10*3/mm3 309         Invalid input(s): PROTCrCl cannot be calculated (Patient's most recent lab result is older than the maximum 30 days allowed.).    No results found for: HGBA1C, POCGLU  Lab Results   Component Value Date    HGBA1C 5.20 04/06/2018     Lab Results   Component Value Date    TSH 2.243 04/08/2018     Microbiology Results (last 10 days)     ** No results found for the last 240 hours. **         Pain Management Panel    There is no flowsheet data to display.       I have personally reviewed the above laboratory results.   ---------------------------------------------------------------------------------------------------------------------  Imaging Results (Last 7 Days)     ** No results found for the last 168 hours. **        I have personally reviewed the above radiology results.     Last Echocardiogram:  Results for orders placed during the hospital encounter of 04/06/18    Adult Transthoracic Echo Complete W/ Cont if Necessary Per Protocol    Interpretation Summary  · Left ventricular systolic function is hyperdynamic (EF > 70%). Estimated EF appears to be in the range of 66 - 70%. Normal left ventricular cavity size and wall thickness noted.  · The study is technically suboptimal for  diagnosis.    ---------------------------------------------------------------------------------------------------------------------    Assessment & Plan      -Acute, traumatic, open, left olecranon fracture   · STAT labs ordered   · Preoperative EKG and CXR ordered   · Cast in place from OSH, not removed as it was placed in the ED  · Obtain x-ray of left elbow, no films were sent with patient  · Orthopedic surgery consulted, input/assistance is much appreciated   · NPO diet for impending surgery   · PRN pain control   · NV checks   · Blood cultures ordered, follow for final results  · Continue empiric coverage with IV vancomycin and Azactam and Flagyl  · Osteopenia noted on radiology report from [facility]: obtain vitamin D level     -Parkinson's disease  · Supportive care  · Resume home medication regimen reconciled per pharmacy    -Generalized anxiety disorder  · Supportive care  · Resume home medication regimen once reconciled per pharmacy    -Chronic anticoagulation  · Unclear diagnosis  · Patient with history of frequent falls, patient not ambulatory typically.  She does transfer from bed to wheelchair.  · Last dose of Eliquis was approximately 3/12/2021 at senior living  · Plan to hold for now and pending possible surgical intervention  · CBC ordered  · No active bleeding noted or appreciated on exam  · SCDs for VTE prophylaxis    -Former smoker     -F/E/N  • Gentle IV fluids. Replace electrolytes per protocol as necessary. NPO diet.     ---------------------------------------------------  DVT Prophylaxis: SCDs   GI Prophylaxis: PPI  Activity: Strict bed rest, turn Q2H, fall precautions     The patient is considered to be a high risk patient due to: Left olecranon fracture, open fracture, Parkinson's disease, anxiety, chronic anticoagulation, former smoker    INPATIENT status due to the need for care which can only be reasonably provided in an hospital setting such as aggressive/expedited ancillary services and/or  consultation services, the necessity for IV medications, close physician monitoring and/or the possible need for procedures.  In such, I feel patient’s risk for adverse outcomes and need for care warrant INPATIENT evaluation and predict the patient’s care encounter to likely last beyond 2 midnights.    Code Status: DNR/DNI per NH records     I have discussed the patient's assessment and plan with the patient, nursing staff, and attending physician Ngozi Zhong MD Allyson O'Kuma, PA-C  Hospitalist Service -- [facility]   Pager: [phone]    03/13/21  03:57 EST    Attending Physician: Ngozi Milligan MD        ---------------------------------------------------------------------------------------------------------------------

## 2021-03-14 LAB
ANION GAP SERPL CALCULATED.3IONS-SCNC: 14.9 MMOL/L (ref 5–15)
ATMOSPHERIC PRESS: 732 MMHG
BASE EXCESS BLDV CALC-SCNC: 1.1 MMOL/L (ref 0–2)
BDY SITE: ABNORMAL
BODY TEMPERATURE: 37 C
BUN SERPL-MCNC: 17 MG/DL (ref 8–23)
BUN/CREAT SERPL: 41.5 (ref 7–25)
CALCIUM SPEC-SCNC: 7.8 MG/DL (ref 8.6–10.5)
CHLORIDE SERPL-SCNC: 111 MMOL/L (ref 98–107)
CO2 BLDA-SCNC: 27.5 MMOL/L (ref 22–33)
CO2 SERPL-SCNC: 14.1 MMOL/L (ref 22–29)
COHGB MFR BLD: 1.5 % (ref 0–5)
CREAT SERPL-MCNC: 0.41 MG/DL (ref 0.57–1)
D-LACTATE SERPL-SCNC: 1.4 MMOL/L (ref 0.5–2)
GFR SERPL CREATININE-BSD FRML MDRD: >150 ML/MIN/1.73
GLUCOSE SERPL-MCNC: 74 MG/DL (ref 65–99)
HCO3 BLDV-SCNC: 26.2 MMOL/L (ref 22–28)
HGB BLDA-MCNC: 11.8 G/DL (ref 13.5–17.5)
INHALED O2 CONCENTRATION: 21 %
Lab: ABNORMAL
MAGNESIUM SERPL-MCNC: 1.7 MG/DL (ref 1.6–2.4)
METHGB BLD QL: <1 % (ref 0–3)
MODALITY: ABNORMAL
OXYHGB MFR BLDV: 78.6 % (ref 45–75)
PCO2 BLDV: 43 MM HG (ref 41–51)
PH BLDV: 7.39 PH UNITS (ref 7.32–7.42)
PO2 BLDV: 46.9 MM HG (ref 27–53)
POTASSIUM SERPL-SCNC: 3.7 MMOL/L (ref 3.5–5.2)
SARS-COV-2 RNA RESP QL NAA+PROBE: NOT DETECTED
SODIUM SERPL-SCNC: 140 MMOL/L (ref 136–145)
TROPONIN T SERPL-MCNC: <0.01 NG/ML (ref 0–0.03)
VENTILATOR MODE: ABNORMAL

## 2021-03-14 PROCEDURE — 25010000002 MAGNESIUM SULFATE IN D5W 1G/100ML (PREMIX) 1-5 GM/100ML-% SOLUTION: Performed by: INTERNAL MEDICINE

## 2021-03-14 PROCEDURE — 25010000002 VANCOMYCIN 1 G RECONSTITUTED SOLUTION: Performed by: INTERNAL MEDICINE

## 2021-03-14 PROCEDURE — 25010000002 MAGNESIUM SULFATE 2 GM/50ML SOLUTION: Performed by: PHYSICIAN ASSISTANT

## 2021-03-14 PROCEDURE — 82805 BLOOD GASES W/O2 SATURATION: CPT

## 2021-03-14 PROCEDURE — 83605 ASSAY OF LACTIC ACID: CPT | Performed by: INTERNAL MEDICINE

## 2021-03-14 PROCEDURE — 84484 ASSAY OF TROPONIN QUANT: CPT | Performed by: INTERNAL MEDICINE

## 2021-03-14 PROCEDURE — 93010 ELECTROCARDIOGRAM REPORT: CPT | Performed by: INTERNAL MEDICINE

## 2021-03-14 PROCEDURE — 80048 BASIC METABOLIC PNL TOTAL CA: CPT | Performed by: INTERNAL MEDICINE

## 2021-03-14 PROCEDURE — 83735 ASSAY OF MAGNESIUM: CPT | Performed by: INTERNAL MEDICINE

## 2021-03-14 PROCEDURE — U0005 INFEC AGEN DETEC AMPLI PROBE: HCPCS | Performed by: NURSE PRACTITIONER

## 2021-03-14 PROCEDURE — 82820 HEMOGLOBIN-OXYGEN AFFINITY: CPT

## 2021-03-14 PROCEDURE — 99231 SBSQ HOSP IP/OBS SF/LOW 25: CPT | Performed by: INTERNAL MEDICINE

## 2021-03-14 PROCEDURE — U0003 INFECTIOUS AGENT DETECTION BY NUCLEIC ACID (DNA OR RNA); SEVERE ACUTE RESPIRATORY SYNDROME CORONAVIRUS 2 (SARS-COV-2) (CORONAVIRUS DISEASE [COVID-19]), AMPLIFIED PROBE TECHNIQUE, MAKING USE OF HIGH THROUGHPUT TECHNOLOGIES AS DESCRIBED BY CMS-2020-01-R: HCPCS | Performed by: NURSE PRACTITIONER

## 2021-03-14 PROCEDURE — 93005 ELECTROCARDIOGRAM TRACING: CPT | Performed by: INTERNAL MEDICINE

## 2021-03-14 RX ORDER — MAGNESIUM SULFATE 1 G/100ML
1 INJECTION INTRAVENOUS ONCE
Status: COMPLETED | OUTPATIENT
Start: 2021-03-14 | End: 2021-03-14

## 2021-03-14 RX ADMIN — BENZTROPINE MESYLATE 1 MG: 1 TABLET ORAL at 21:35

## 2021-03-14 RX ADMIN — BENZTROPINE MESYLATE 1 MG: 1 TABLET ORAL at 08:23

## 2021-03-14 RX ADMIN — DOCUSATE SODIUM 100 MG: 100 CAPSULE, LIQUID FILLED ORAL at 08:23

## 2021-03-14 RX ADMIN — POLYETHYLENE GLYCOL (3350) 17 G: 17 POWDER, FOR SOLUTION ORAL at 08:23

## 2021-03-14 RX ADMIN — SODIUM CHLORIDE, PRESERVATIVE FREE 10 ML: 5 INJECTION INTRAVENOUS at 21:36

## 2021-03-14 RX ADMIN — METRONIDAZOLE 500 MG: 500 INJECTION, SOLUTION INTRAVENOUS at 05:10

## 2021-03-14 RX ADMIN — Medication 1 CAPSULE: at 08:23

## 2021-03-14 RX ADMIN — VANCOMYCIN HYDROCHLORIDE 1000 MG: 1 INJECTION, POWDER, LYOPHILIZED, FOR SOLUTION INTRAVENOUS at 03:54

## 2021-03-14 RX ADMIN — LACTULOSE 20 G: 10 SOLUTION ORAL at 21:36

## 2021-03-14 RX ADMIN — METRONIDAZOLE 500 MG: 500 INJECTION, SOLUTION INTRAVENOUS at 14:03

## 2021-03-14 RX ADMIN — SODIUM CHLORIDE 2 G: 900 INJECTION INTRAVENOUS at 08:22

## 2021-03-14 RX ADMIN — BUSPIRONE HYDROCHLORIDE 15 MG: 10 TABLET ORAL at 16:11

## 2021-03-14 RX ADMIN — SENNOSIDES 1 TABLET: 8.6 TABLET, FILM COATED ORAL at 08:23

## 2021-03-14 RX ADMIN — SODIUM CHLORIDE 2 G: 900 INJECTION INTRAVENOUS at 16:11

## 2021-03-14 RX ADMIN — MAGNESIUM SULFATE IN DEXTROSE 1 G: 10 INJECTION, SOLUTION INTRAVENOUS at 05:56

## 2021-03-14 RX ADMIN — CITALOPRAM HYDROBROMIDE 10 MG: 20 TABLET ORAL at 21:35

## 2021-03-14 RX ADMIN — SENNOSIDES 1 TABLET: 8.6 TABLET, FILM COATED ORAL at 21:36

## 2021-03-14 RX ADMIN — BUSPIRONE HYDROCHLORIDE 15 MG: 10 TABLET ORAL at 08:23

## 2021-03-14 RX ADMIN — SODIUM CHLORIDE, PRESERVATIVE FREE 10 ML: 5 INJECTION INTRAVENOUS at 08:22

## 2021-03-14 RX ADMIN — BUSPIRONE HYDROCHLORIDE 15 MG: 10 TABLET ORAL at 22:14

## 2021-03-14 RX ADMIN — RENO CAPS 1 MG: 100; 1.5; 1.7; 20; 10; 1; 150; 5; 6 CAPSULE ORAL at 08:23

## 2021-03-14 RX ADMIN — FERROUS SULFATE TAB 325 MG (65 MG ELEMENTAL FE) 325 MG: 325 (65 FE) TAB at 08:23

## 2021-03-14 RX ADMIN — Medication 5000 UNITS: at 14:01

## 2021-03-14 NOTE — PLAN OF CARE
Goal Outcome Evaluation:        Patient resting in bed with no complaints. Patient confused but pleasant and seems to be comfortable.  Patient planned to have surgery to fix elbow tomorrow. Will monitor and follow plan of care.

## 2021-03-14 NOTE — NURSING NOTE
Patient confused at this time, telephone consent obtained via phone with second RN verification from patient son Diaz.  Son asks that we call him when we find out what time the surgery will take place.

## 2021-03-14 NOTE — PLAN OF CARE
Goal Outcome Evaluation:  Pt has been resting in bed. No complaints. Pt tolerated med's crushed in applesauce well. Will continue to provide care.

## 2021-03-14 NOTE — PROGRESS NOTES
[facility] Medicine Services  PROGRESS NOTE     Patient Identification:  Name:  Debra Resendiz  Age:  75 y.o.  Sex:  female  :  1945  MRN:  [id number]  Visit Number:  [id number]  Primary Care Provider:  Leigh Ann Fu MD    Length of stay:  1    ----------------------------------------------------------------------------------------------------------------------  Subjective     Chief Complaint:  Follow up for fall at nursing home with open left elbow fracture    Subjective:  Today, the patient is doing okay today.  Does not have any complaints.  Feels okay no nausea or vomiting no abdominal pain.    ----------------------------------------------------------------------------------------------------------------------  Objective     Current Hospital Meds:  aztreonam, 2 g, Intravenous, Q8H  b complex-C-folic acid, 1 capsule, Oral, Daily  benztropine, 1 mg, Oral, BID  busPIRone, 15 mg, Oral, TID  citalopram, 10 mg, Oral, Nightly  docusate sodium, 100 mg, Oral, BID  ferrous sulfate, 325 mg, Oral, BID  lactobacillus acidophilus, 1 capsule, Oral, Daily  lactulose, 20 g, Oral, Nightly  metroNIDAZOLE, 500 mg, Intravenous, Q8H  pantoprazole, 40 mg, Oral, Q AM  polyethylene glycol, 17 g, Oral, Daily  senna, 1 tablet, Oral, BID  sodium chloride, 10 mL, Intravenous, Q12H  vancomycin, 1,000 mg, Intravenous, Q18H  vitamin D3, 5,000 Units, Oral, Daily         ----------------------------------------------------------------------------------------------------------------------  Vital Signs:  Temp:  [97.4 °F (36.3 °C)-97.8 °F (36.6 °C)] 97.5 °F (36.4 °C)  Heart Rate:  [70-83] 74  Resp:  [18-20] 20  BP: (100-120)/(48-57) 100/52  Mean Arterial Pressure (Non-Invasive) for the past 24 hrs (Last 3 readings):   Noninvasive MAP (mmHg)   21 1825 68   21 1405 65   21 1046 69     SpO2 Percentage    21 1046 21 1405 21 182   SpO2: 96% 97% 97%     SpO2:   [95 %-97 %] 97 %  on   ;   Device (Oxygen Therapy): room air    Body mass index is 16.46 kg/m².  Wt Readings from Last 3 Encounters:   03/14/21 46.3 kg (102 lb)   04/13/18 56.3 kg (124 lb 3.2 oz)        Intake/Output Summary (Last 24 hours) at 3/14/2021 1950  Last data filed at 3/14/2021 1825  Gross per 24 hour   Intake 1781.93 ml   Output --   Net 1781.93 ml     Diet Soft Texture; Ground; Thin  ----------------------------------------------------------------------------------------------------------------------  Physical exam:   GEN: No acute distress, sitting up in bed  NEURO: Awake and oriented to person place and situation, has resting tremor very soft-spoken  MSK: Left elbow at 90 degrees in soft cast.  No hand edema    ----------------------------------------------------------------------------------------------------------------------            Results from last 7 days   Lab Units 03/14/21  1305 03/13/21  0322   LACTATE mmol/L 1.4  --    WBC 10*3/mm3  --  8.69   HEMOGLOBIN g/dL  --  11.6*   HEMATOCRIT %  --  36.2   MCV fL  --  98.4*   MCHC g/dL  --  32.0   PLATELETS 10*3/mm3  --  309     Results from last 7 days   Lab Units 03/14/21  0109 03/13/21  0322   SODIUM mmol/L 140 136   POTASSIUM mmol/L 3.7 4.3   MAGNESIUM mg/dL 1.7 1.9   CHLORIDE mmol/L 111* 104   CO2 mmol/L 14.1* 23.9   BUN mg/dL 17 15   CREATININE mg/dL 0.41* 0.53*   EGFR IF NONAFRICN AM mL/min/1.73 >150 113   CALCIUM mg/dL 7.8* 9.2   GLUCOSE mg/dL 74 94   ALBUMIN g/dL  --  3.22*   BILIRUBIN mg/dL  --  0.5   ALK PHOS U/L  --  100   AST (SGOT) U/L  --  18   ALT (SGPT) U/L  --  15   Estimated Creatinine Clearance: 45.1 mL/min (A) (by C-G formula based on SCr of 0.41 mg/dL (L)).  No results found for: AMMONIA    No results found for: HGBA1C, POCGLU  Lab Results   Component Value Date    HGBA1C 5.20 04/06/2018     Lab Results   Component Value Date    TSH 2.243 04/08/2018       Blood Culture   Date Value Ref Range Status   03/13/2021 No growth at 24  hours  Preliminary   03/13/2021 No growth at 24 hours  Preliminary     Urine Culture   Date Value Ref Range Status   03/13/2021 >100,000 CFU/mL Escherichia coli (A)  Preliminary       ----------------------------------------------------------------------------------------------------------------------  Assessment/Plan     · Traumatic left open olecranon fracture- OR tomorrow for ORIF.  Surgery delayed as patient had been on 2.5 mg of Eliquis twice daily for DVT prophylaxis since January at nursing home. she was started on aztreonam and vancomycin as well as Flagyl given nature of fracture.  However given blood cultures have remained negative and wound was cleaned and wrapped by orthopedic surgery will DC vancomycin and Flagyl tonight.  Can continue aztreonam for UTI given penicillin allergy  · E. coli UTI(POA)-Rocephin  · Anion gap metabolic acidosis-unclear etiology.  Possible ketosis due to poor oral intake.  Lactate is normal.  VBG with normal pH.  No medications to explain acidosis.  Will monitor closely  · Vitamin D deficiency-started supplementation  · Parkinson's disease-continue home meds.  Patient needs assistance with meals and all ADLs as she is mostly nonambulatory  · General anxiety disorder  · History of multiple bone fractures due to fall from Parkinson disease  · Iron deficiency anemia  · Urinary incontinence-chronic Naqvi    --------------------------------------------------  DVT Prophylaxis: On hold for surgery tomorrow     Disposition: Can return to nursing home once medically ready after surgery  --------------------------------------------------      Katayoun Behbahani, MD  Hospitalist Service -- [facility]     03/14/21  19:50 EDT

## 2021-03-15 LAB
ANION GAP SERPL CALCULATED.3IONS-SCNC: 7.9 MMOL/L (ref 5–15)
BACTERIA SPEC AEROBE CULT: ABNORMAL
BASOPHILS # BLD AUTO: 0.07 10*3/MM3 (ref 0–0.2)
BASOPHILS NFR BLD AUTO: 0.7 % (ref 0–1.5)
BUN SERPL-MCNC: 14 MG/DL (ref 8–23)
BUN/CREAT SERPL: 29.2 (ref 7–25)
CALCIUM SPEC-SCNC: 9.3 MG/DL (ref 8.6–10.5)
CHLORIDE SERPL-SCNC: 105 MMOL/L (ref 98–107)
CO2 SERPL-SCNC: 18.1 MMOL/L (ref 22–29)
CREAT SERPL-MCNC: 0.48 MG/DL (ref 0.57–1)
DEPRECATED RDW RBC AUTO: 52.1 FL (ref 37–54)
EOSINOPHIL # BLD AUTO: 0.26 10*3/MM3 (ref 0–0.4)
EOSINOPHIL NFR BLD AUTO: 2.7 % (ref 0.3–6.2)
ERYTHROCYTE [DISTWIDTH] IN BLOOD BY AUTOMATED COUNT: 13.6 % (ref 12.3–15.4)
GFR SERPL CREATININE-BSD FRML MDRD: 126 ML/MIN/1.73
GLUCOSE SERPL-MCNC: 86 MG/DL (ref 65–99)
HCT VFR BLD AUTO: 43.8 % (ref 34–46.6)
HGB BLD-MCNC: 13.1 G/DL (ref 12–15.9)
IMM GRANULOCYTES # BLD AUTO: 0.05 10*3/MM3 (ref 0–0.05)
IMM GRANULOCYTES NFR BLD AUTO: 0.5 % (ref 0–0.5)
LYMPHOCYTES # BLD AUTO: 1.93 10*3/MM3 (ref 0.7–3.1)
LYMPHOCYTES NFR BLD AUTO: 19.7 % (ref 19.6–45.3)
MCH RBC QN AUTO: 31.3 PG (ref 26.6–33)
MCHC RBC AUTO-ENTMCNC: 29.9 G/DL (ref 31.5–35.7)
MCV RBC AUTO: 104.5 FL (ref 79–97)
MONOCYTES # BLD AUTO: 0.67 10*3/MM3 (ref 0.1–0.9)
MONOCYTES NFR BLD AUTO: 6.9 % (ref 5–12)
NEUTROPHILS NFR BLD AUTO: 6.8 10*3/MM3 (ref 1.7–7)
NEUTROPHILS NFR BLD AUTO: 69.5 % (ref 42.7–76)
NRBC BLD AUTO-RTO: 0 /100 WBC (ref 0–0.2)
PLATELET # BLD AUTO: 309 10*3/MM3 (ref 140–450)
PMV BLD AUTO: 10 FL (ref 6–12)
POTASSIUM SERPL-SCNC: 5 MMOL/L (ref 3.5–5.2)
RBC # BLD AUTO: 4.19 10*6/MM3 (ref 3.77–5.28)
SODIUM SERPL-SCNC: 131 MMOL/L (ref 136–145)
WBC # BLD AUTO: 9.78 10*3/MM3 (ref 3.4–10.8)

## 2021-03-15 PROCEDURE — 97162 PT EVAL MOD COMPLEX 30 MIN: CPT

## 2021-03-15 PROCEDURE — 99232 SBSQ HOSP IP/OBS MODERATE 35: CPT | Performed by: INTERNAL MEDICINE

## 2021-03-15 PROCEDURE — 85025 COMPLETE CBC W/AUTO DIFF WBC: CPT | Performed by: INTERNAL MEDICINE

## 2021-03-15 PROCEDURE — 25010000002 MORPHINE PER 10 MG: Performed by: PHYSICIAN ASSISTANT

## 2021-03-15 PROCEDURE — 25010000002 ERTAPENEM PER 500 MG: Performed by: INTERNAL MEDICINE

## 2021-03-15 PROCEDURE — 97167 OT EVAL HIGH COMPLEX 60 MIN: CPT

## 2021-03-15 PROCEDURE — 80048 BASIC METABOLIC PNL TOTAL CA: CPT | Performed by: INTERNAL MEDICINE

## 2021-03-15 RX ADMIN — SENNOSIDES 1 TABLET: 8.6 TABLET, FILM COATED ORAL at 20:48

## 2021-03-15 RX ADMIN — Medication 10 MG: at 20:48

## 2021-03-15 RX ADMIN — MORPHINE SULFATE 1 MG: 2 INJECTION, SOLUTION INTRAMUSCULAR; INTRAVENOUS at 13:51

## 2021-03-15 RX ADMIN — ERTAPENEM SODIUM 1 G: 1 INJECTION, POWDER, LYOPHILIZED, FOR SOLUTION INTRAMUSCULAR; INTRAVENOUS at 17:31

## 2021-03-15 RX ADMIN — BUSPIRONE HYDROCHLORIDE 15 MG: 10 TABLET ORAL at 22:39

## 2021-03-15 RX ADMIN — LACTULOSE 20 G: 10 SOLUTION ORAL at 20:48

## 2021-03-15 RX ADMIN — BUSPIRONE HYDROCHLORIDE 15 MG: 10 TABLET ORAL at 17:31

## 2021-03-15 RX ADMIN — VANCOMYCIN HYDROCHLORIDE 1000 MG: 1 INJECTION, POWDER, LYOPHILIZED, FOR SOLUTION INTRAVENOUS at 17:31

## 2021-03-15 RX ADMIN — CITALOPRAM HYDROBROMIDE 10 MG: 20 TABLET ORAL at 20:48

## 2021-03-15 RX ADMIN — BENZTROPINE MESYLATE 1 MG: 1 TABLET ORAL at 20:48

## 2021-03-15 RX ADMIN — SODIUM CHLORIDE 2 G: 900 INJECTION INTRAVENOUS at 08:26

## 2021-03-15 RX ADMIN — MORPHINE SULFATE 1 MG: 2 INJECTION, SOLUTION INTRAMUSCULAR; INTRAVENOUS at 17:31

## 2021-03-15 RX ADMIN — SODIUM CHLORIDE, PRESERVATIVE FREE 10 ML: 5 INJECTION INTRAVENOUS at 20:49

## 2021-03-15 RX ADMIN — SODIUM CHLORIDE 2 G: 900 INJECTION INTRAVENOUS at 00:02

## 2021-03-15 NOTE — PROGRESS NOTES
Inpatient Progress Note  Debra Resendiz  Date: 03/15/21  MRN: [id number]      Subjective:   Orthopedic surgery is following the patient for left elbow olecranon fracture.  Patient was noted to have open wound of the left elbow in [place], was placed in splint,  and sent to [facility] emergency department for further treatment recommendations.  Patient was recommended to have a left elbow open reduction internal fixation.  Patient is chronically anticoagulated with Eliquis.  Her last dose was on 3/12/2021.  The patient is somewhat confused on exam today.  She does not know where she is.  She does know her name and date of birth.  Patient's date of injury per records was last Tuesday on 3/9/2021.         Objective:      Vitals:    03/15/21 0502 03/15/21 0700 03/15/21 1100 03/15/21 1444   BP:  153/68 124/62 136/71   BP Location:  Right arm Right arm Right arm   Patient Position:  Lying Lying Lying   Pulse:  78 83 86   Resp:  20 18 20   Temp:  97.9 °F (36.6 °C) 98 °F (36.7 °C) 98.1 °F (36.7 °C)   TempSrc:  Oral Oral Oral   SpO2:  97% 98% 97%   Weight: 44.4 kg (97 lb 12.8 oz)      Height:              Physical Exam:  Constitutional: Chronically ill female.  No respiratory distress.        Musculoskeletal: Upon examination of the left elbow there is mild soft tissue swelling noted.  There is 3 wounds noted to the left elbow.  2 of which are inferior to the left elbow and one at the lateral aspect of the left elbow. the largest wound is approximately 20 x 10mm which is inferior to the left elbow.  Skin tear-like wound noted.  No bony exposure is noted.  There is 2 smaller wounds laterally to this skin tear.  No infectious process.  Mild ecchymosis noted.  Neurovascularly intact left upper extremity.  Flexion-extension intact distal digits of left hand.  No cyanosis noted.      Labs:    Results from last 7 days   Lab Units 03/15/21  1331 03/14/21  1305 03/13/21  0322   LACTATE mmol/L  --  1.4  --    WBC  10*3/mm3 9.78  --  8.69   HEMOGLOBIN g/dL 13.1  --  11.6*   HEMATOCRIT % 43.8  --  36.2   MCV fL 104.5*  --  98.4*   MCHC g/dL 29.9*  --  32.0   PLATELETS 10*3/mm3 309  --  309         Results from last 7 days   Lab Units 03/15/21  1331 03/14/21  0109 03/13/21  0322   SODIUM mmol/L 131* 140 136   POTASSIUM mmol/L 5.0 3.7 4.3   MAGNESIUM mg/dL  --  1.7 1.9   CHLORIDE mmol/L 105 111* 104   CO2 mmol/L 18.1* 14.1* 23.9   BUN mg/dL 14 17 15   CREATININE mg/dL 0.48* 0.41* 0.53*   EGFR IF NONAFRICN AM mL/min/1.73 126 >150 113   CALCIUM mg/dL 9.3 7.8* 9.2   GLUCOSE mg/dL 86 74 94   ALBUMIN g/dL  --   --  3.22*   BILIRUBIN mg/dL  --   --  0.5   ALK PHOS U/L  --   --  100   AST (SGOT) U/L  --   --  18   ALT (SGPT) U/L  --   --  15   Estimated Creatinine Clearance: 43.2 mL/min (A) (by C-G formula based on SCr of 0.48 mg/dL (L)).  No results found for: AMMONIA  Results from last 7 days   Lab Units 03/14/21  2154   TROPONIN T ng/mL <0.010         No results found for: HGBA1C  Lab Results   Component Value Date    TSH 2.243 04/08/2018         Pain Management Panel    There is no flowsheet data to display.         Blood Culture   Date Value Ref Range Status   03/13/2021 No growth at 2 days  Preliminary   03/13/2021 No growth at 2 days  Preliminary     Urine Culture   Date Value Ref Range Status   03/13/2021 >100,000 CFU/mL Escherichia coli ESBL (A)  Final     Comment:     Consider infectious disease consult.  Susceptibility results may not correlate to clinical outcomes.     No results found for: WOUNDCX  No results found for: STOOLCX              Radiology:  Imaging Results (Last 72 Hours)     Procedure Component Value Units Date/Time    XR Chest 1 View [[id number]] Collected: 03/13/21 1045     Updated: 03/13/21 1047    Narrative:      EXAM:    XR Chest, 1 View     EXAM DATE:    3/13/2021 5:37 AM     CLINICAL HISTORY:    Pre operative clearance     TECHNIQUE:    Frontal view of the chest.     COMPARISON:    04/06/2018      FINDINGS:    Lungs:  Stable chronic appearing lung changes.    Pleural space:  Unremarkable.  No pneumothorax.    Heart:  Unremarkable.  No cardiomegaly.    Mediastinum:  Unremarkable.    Bones/joints:  Unremarkable.       Impression:      Stable chronic lung changes. No acute cardiopulmonary findings  identified.     This report was finalized on 3/13/2021 10:45 AM by Dr. Viraj Appiah MD.       XR Elbow 2 View Left [[id number]] Collected: 03/13/21 0545     Updated: 03/13/21 0548    Narrative:      CR Elbow 2 Vws LT    INDICATION:   Evaluate olecranon fracture    COMPARISON:   None available    FINDINGS:   2 views of the left elbow.  There is a fracture visible in the dorsal cortex of the proximal ulna. That fracture is about 3 cm from the proximal end of the bone. It is not possible on this study to see the entire fracture of the probably passes through  the bone into the olecranon fossa  No bone erosion or destruction.  No foreign body.      Impression:      There is a defect in the dorsal cortex in the proximal ulna. Most likely there is a complete fracture through into the olecranon fossa but is not clearly visible on these images    Signer Name: Dane Molina MD   Signed: 3/13/2021 5:45 AM   Workstation Name: [facility]    [facility]            Assessment:   #1 left elbow olecranon fracture with wound          Plan:   Patient is scheduled for surgical intervention tomorrow.  N.p.o. after midnight.  Surgical intervention to be performed by Dr. Mccollum.  Patient scheduled for left elbow open reduction internal fixation.  Patient's p.o. Eliquis is on hold.  Orthopedic surgery will follow patient closely.  Patient's left upper extremity to be placed back in splint with Adaptic's to skin tears.  The patient noted that her youngest son signs her consents and legal documents.  Unsure if there is power of  designation.  We will have nursing investigate this.        Diaz Elena, APRN  March 15, 2021 15:04 EDT

## 2021-03-15 NOTE — CONSULTS
INFECTIOUS DISEASE CONSULTATION REPORT        Patient Identification:  Name:  Debra Resendiz  Age:  75 y.o.  Sex:  female  :  1945  MRN:  [id number]   Visit Number:  [id number]  Primary Care Physician:  Leigh Ann Fu MD       LOS: 2 days        Subjective       Subjective     History of present illness:      Thank you Dr. Epps for allowing us to participate in the care of your patient.  As you well know, Ms. Debra Resendiz is a 75 y.o. female nursing home resident with past medical history significant for Parkinson's disease, frequent falls, and generalized anxiety disorder, who was transferred from [facility] for evaluation of left open olecranon fracture.  Patient was seen at [facility] on 3/10/2021 and was provided a splint at that time.  She was scheduled for outpatient follow-up with orthopedic surgeon.  After having seen orthopedic surgeon, patient was sent to the ED on 3/12/2021 for concerns of open fracture with recommendation to transfer to a higher level of care.  Patient is scheduled for surgery tomorrow, 3/16/2021.  She is currently on Azactam, but with risk of osteomyelitis in combination of ESBL UTI, primary team have asked ID to weigh in.    Today, patient is on room air.  Afebrile.  WBC on 3/13/2021 was normal at 8.69.  Lactic acid on 3/14/2021 was normal at 1.4.  Urinalysis on 3/13/2021 was positive with 6-12 WBCs and 4+ bacteria.  Urine culture finalized as greater than 100,000 colonies of E. coli ESBL.  Left elbow x-ray on 3/13/2021 showed a defect in the dorsal cortex of the proximal ulna.  Most likely there is a complete fracture through into the olecranon fossa but is not clearly visible on these images.  Chest x-ray on 3/13/2021 showed stable chronic lung changes.  No acute cardiopulmonary findings.  Blood cultures on 3/13/2021 are so far showing no growth at 2 days.  COVID-19 testing on 3/14/2021 was negative.    Infectious Disease consultation was requested  for antimicrobial management.    ---------------------------------------------------------------------------------------------------------------------     Review Of Systems:    Constitutional: no fever, chills and night sweats. No appetite change or unexpected weight change. No fatigue.  Eyes: no eye drainage, itching or redness.  HEENT: no mouth sores, dysphagia or nose bleed.    Respiratory:  No shortness of breath, cough, or production of sputum.  Cardiovascular: no chest pain, no palpitations, no orthopnea.  Gastrointestinal: no vomiting or diarrhea. No abdominal pain, hematemesis or rectal bleeding.  Nausea.  Genitourinary: no dysuria or polyuria.  Hematologic/lymphatic: no lymph node abnormalities, no easy bruising or easy bleeding.  Musculoskeletal:  No muscle or joint pain.  Skin: No rash and no itching.  Neurological: no loss of consciousness, no seizure, no headache.  Behavioral/Psych: no depression or suicidal ideation.  Endocrine: no hot flashes.  Immunologic: negative.    ---------------------------------------------------------------------------------------------------------------------     Past Medical History    No past medical history on file.    Past Surgical History    Past Surgical History:   Procedure Laterality Date    HIP SURGERY      HYSTERECTOMY      ORIF HUMERUS FRACTURE Left 4/8/2018    Procedure: LEFT HUMERUS PROXIMAL OPEN REDUCTION INTERNAL FIXATION;  Surgeon: Dani Aburto MD;  Location: [facility];  Service: Orthopedics    TUBAL ABDOMINAL LIGATION         Family History    No family history on file.    Social History    Social History     Tobacco Use    Smoking status: Former Smoker   Substance Use Topics    Alcohol use: No    Drug use: No       Allergies    Penicillins  ---------------------------------------------------------------------------------------------------------------------     Home Medications:    Prior to Admission Medications       Prescriptions Last Dose Informant  Patient Reported? Taking?    acetaminophen (TYLENOL) 650 MG 8 hr tablet Past Week  Yes Yes    Take 650 mg by mouth Every 8 (Eight) Hours As Needed for Mild Pain .    apixaban (ELIQUIS) 2.5 MG tablet tablet 3/12/2021  Yes Yes    Take 2.5 mg by mouth 2 (Two) Times a Day.    B Complex-C-Folic Acid (B-Plex) tablet 3/12/2021  Yes Yes    Take 1 tablet by mouth Daily.    benztropine (COGENTIN) 1 MG tablet 3/12/2021  Yes Yes    Take 1 mg by mouth 2 (Two) Times a Day.    bisacodyl (Bisacodyl Laxative) 10 MG suppository Past Month  Yes Yes    Insert 10 mg into the rectum Daily As Needed for Constipation.    busPIRone (BUSPAR) 15 MG tablet 3/12/2021  Yes Yes    Take 15 mg by mouth 3 (Three) Times a Day.    docusate sodium 100 MG capsule 3/12/2021  No Yes    Take 200 mg by mouth 2 (Two) Times a Day.    ferrous sulfate 325 (65 FE) MG tablet 3/12/2021  Yes Yes    Take 325 mg by mouth 2 (two) times a day.    HYDROcodone-acetaminophen (NORCO) 5-325 MG per tablet 3/12/2021  Yes Yes    Take 1 tablet by mouth Every 6 (Six) Hours As Needed.    magnesium hydroxide (MILK OF MAGNESIA) 400 MG/5ML suspension Past Month  Yes Yes    Take 30 mL by mouth Daily As Needed for Constipation.    Melatonin 10 MG tablet Past Month  Yes Yes    Take 10 mg by mouth At Night As Needed (Sleep).    polyethylene glycol (MIRALAX) pack packet 3/12/2021  No Yes    Take 17 g by mouth Daily.    senna (senna) 8.6 MG tablet 3/12/2021  Yes Yes    Take 1 tablet by mouth 2 (Two) Times a Day.    citalopram (CeleXA) 10 MG tablet 3/11/2021  Yes No    Take 10 mg by mouth every night at bedtime.    lactulose (CHRONULAC) 10 GM/15ML solution 3/11/2021  Yes No    Take 20 g by mouth every night at bedtime.          ---------------------------------------------------------------------------------------------------------------------    Objective       Objective     Hospital Scheduled Meds:  aztreonam, 2 g, Intravenous, Q8H  b complex-C-folic acid, 1 capsule, Oral,  Daily  benztropine, 1 mg, Oral, BID  busPIRone, 15 mg, Oral, TID  citalopram, 10 mg, Oral, Nightly  docusate sodium, 100 mg, Oral, BID  ferrous sulfate, 325 mg, Oral, BID  lactobacillus acidophilus, 1 capsule, Oral, Daily  lactulose, 20 g, Oral, Nightly  pantoprazole, 40 mg, Oral, Q AM  polyethylene glycol, 17 g, Oral, Daily  senna, 1 tablet, Oral, BID  sodium chloride, 10 mL, Intravenous, Q12H  vitamin D3, 5,000 Units, Oral, Daily         ---------------------------------------------------------------------------------------------------------------------   Vital Signs:  Temp:  [97.5 °F (36.4 °C)-98 °F (36.7 °C)] 98 °F (36.7 °C)  Heart Rate:  [72-85] 83  Resp:  [18-20] 18  BP: (100-153)/(48-84) 124/62  Mean Arterial Pressure (Non-Invasive) for the past 24 hrs (Last 3 readings):   Noninvasive MAP (mmHg)   03/15/21 0700 100   03/14/21 2144 98   03/14/21 1825 68     SpO2 Percentage    03/15/21 0256 03/15/21 0700 03/15/21 1100   SpO2: 97% 97% 98%     SpO2:  [97 %-98 %] 98 %  on   ;   Device (Oxygen Therapy): room air    Body mass index is 15.79 kg/m².  Wt Readings from Last 3 Encounters:   03/15/21 44.4 kg (97 lb 12.8 oz)   04/13/18 56.3 kg (124 lb 3.2 oz)     ---------------------------------------------------------------------------------------------------------------------     Physical Exam:    Constitutional: Elderly  female is sitting up in bed in no apparent distress.  Mild tremor.  HENT:  Head: Normocephalic and atraumatic.  Mouth:  Moist mucous membranes.    Eyes:   No erythema or edema noted.  Neck:  Neck supple.  No JVD present.    Cardiovascular:  Normal rate, regular rhythm and normal heart sounds with no murmur. No edema.  Pulmonary/Chest:  Lungs are clear to auscultation.  No rhonchi, rales, or wheezes auscultated.  Abdominal:  Soft.  Bowel sounds are normal.  No distension and no tenderness.    Musculoskeletal:  No edema, no tenderness, and no deformity.  No swelling or redness of joints.  Left  arm is in a cast.  Neurological:  Awake, alert, and oriented to person, place, and time. Mild tremor.  Skin:  Skin is warm and dry. No pallor.  No rash or lesion noted.    Psychiatric:  Calm and cooperative.    ---------------------------------------------------------------------------------------------------------------------    Results from last 7 days   Lab Units 03/14/21  2154   TROPONIN T ng/mL <0.010           Results from last 7 days   Lab Units 03/14/21  1305 03/13/21  0322   LACTATE mmol/L 1.4  --    WBC 10*3/mm3  --  8.69   HEMOGLOBIN g/dL  --  11.6*   HEMATOCRIT %  --  36.2   MCV fL  --  98.4*   MCHC g/dL  --  32.0   PLATELETS 10*3/mm3  --  309     Results from last 7 days   Lab Units 03/14/21  0109 03/13/21  0322   SODIUM mmol/L 140 136   POTASSIUM mmol/L 3.7 4.3   MAGNESIUM mg/dL 1.7 1.9   CHLORIDE mmol/L 111* 104   CO2 mmol/L 14.1* 23.9   BUN mg/dL 17 15   CREATININE mg/dL 0.41* 0.53*   EGFR IF NONAFRICN AM mL/min/1.73 >150 113   CALCIUM mg/dL 7.8* 9.2   GLUCOSE mg/dL 74 94   ALBUMIN g/dL  --  3.22*   BILIRUBIN mg/dL  --  0.5   ALK PHOS U/L  --  100   AST (SGOT) U/L  --  18   ALT (SGPT) U/L  --  15   Estimated Creatinine Clearance: 43.2 mL/min (A) (by C-G formula based on SCr of 0.41 mg/dL (L)).  No results found for: AMMONIA    No results found for: HGBA1C, POCGLU  Lab Results   Component Value Date    HGBA1C 5.20 04/06/2018     Lab Results   Component Value Date    TSH 2.243 04/08/2018       Blood Culture   Date Value Ref Range Status   03/13/2021 No growth at 2 days  Preliminary   03/13/2021 No growth at 2 days  Preliminary     Urine Culture   Date Value Ref Range Status   03/13/2021 >100,000 CFU/mL Escherichia coli ESBL (A)  Final     Comment:     Consider infectious disease consult.  Susceptibility results may not correlate to clinical outcomes.     No results found for: WOUNDCX  No results found for: STOOLCX  No results found for: RESPCX  Pain Management Panel    There is no flowsheet data to  display.       I have personally reviewed the above laboratory results.   ---------------------------------------------------------------------------------------------------------------------  Imaging Results (Last 7 Days)       Procedure Component Value Units Date/Time    XR Chest 1 View [[id number]] Collected: 03/13/21 1045     Updated: 03/13/21 1047    Narrative:      EXAM:    XR Chest, 1 View     EXAM DATE:    3/13/2021 5:37 AM     CLINICAL HISTORY:    Pre operative clearance     TECHNIQUE:    Frontal view of the chest.     COMPARISON:    04/06/2018     FINDINGS:    Lungs:  Stable chronic appearing lung changes.    Pleural space:  Unremarkable.  No pneumothorax.    Heart:  Unremarkable.  No cardiomegaly.    Mediastinum:  Unremarkable.    Bones/joints:  Unremarkable.       Impression:      Stable chronic lung changes. No acute cardiopulmonary findings  identified.     This report was finalized on 3/13/2021 10:45 AM by Dr. Viraj Appiah MD.       XR Elbow 2 View Left [[id number]] Collected: 03/13/21 0545     Updated: 03/13/21 0548    Narrative:      CR Elbow 2 Vws LT    INDICATION:   Evaluate olecranon fracture    COMPARISON:   None available    FINDINGS:   2 views of the left elbow.  There is a fracture visible in the dorsal cortex of the proximal ulna. That fracture is about 3 cm from the proximal end of the bone. It is not possible on this study to see the entire fracture of the probably passes through  the bone into the olecranon fossa  No bone erosion or destruction.  No foreign body.      Impression:      There is a defect in the dorsal cortex in the proximal ulna. Most likely there is a complete fracture through into the olecranon fossa but is not clearly visible on these images    Signer Name: Dane Molina MD   Signed: 3/13/2021 5:45 AM   Workstation Name: [id number]    [facility]          I have personally reviewed the above radiology results.    ---------------------------------------------------------------------------------------------------------------------      Assessment & Plan        Assessment/Plan       ASSESSMENT:    1.  ESBL UTI  2.  Osteomyelitis    PLAN:    Patient was seen at [facility] on 3/10/2021 and was provided a splint at that time.  She was scheduled for outpatient follow-up with orthopedic surgeon.  After having seen orthopedic surgeon, patient was sent to the ED on 3/12/2021 for concerns of open fracture with recommendation to transfer to a higher level of care.  Patient is scheduled for surgery tomorrow, 3/16/2021.  She is currently on Azactam, but with risk of osteomyelitis in combination of ESBL UTI, primary team have asked ID to weigh in.    Today, patient is on room air.  Afebrile.  WBC on 3/13/2021 was normal at 8.69.  Lactic acid on 3/14/2021 was normal at 1.4.  Urinalysis on 3/13/2021 was positive with 6-12 WBCs and 4+ bacteria.  Urine culture finalized as greater than 100,000 colonies of E. coli ESBL.  Left elbow x-ray on 3/13/2021 showed a defect in the dorsal cortex of the proximal ulna.  Most likely there is a complete fracture through into the olecranon fossa but is not clearly visible on these images.  Chest x-ray on 3/13/2021 showed stable chronic lung changes.  No acute cardiopulmonary findings.  Blood cultures on 3/13/2021 are so far showing no growth at 2 days.  COVID-19 testing on 3/14/2021 was negative.    Unfortunately in the setting of open fracture and evidence of local infection, it is very concerning for superimposed acute osteomyelitis and we will treat as such.    Recommend to acquire deep intraoperative cultures during surgery tomorrow.  This is very important to direct 6-week IV antibiotic course.  With urine culture showing ESBL, antibiotic therapy was changed to vancomycin pharmacy to dose for osteomyelitis and Invanz pharmacy to dose for UTI and osteomyelitis with hopes to de-escalate when  cultures become available.  We will follow CRP closely.    Again, thank you Dr. Epps for allowing us to participate in the care of your patient and please feel free to call for any questions you may have.    Code Status:   Code Status and Medical Interventions:   Ordered at: 03/13/21 0434     Limited Support to NOT Include:    Intubation     Code Status:    No CPR     Medical Interventions (Level of Support Prior to Arrest):    Limited     CRISSY Llanos  03/15/21  13:36 EDT

## 2021-03-15 NOTE — PROGRESS NOTES
Pharmacokinetics Service Note:    Pharmacy was consulted to dose vancomycin for Ms. Resendiz to treat bone/ joint infection. She was previously on vancomycin 1 gm q18h this admission with her last dose on 3/14 at 0354. Based on an estimated CrCl of 43 mL/min and patient specific demographics, will resume 1 gm q18h dosing to target an AUC in the range of 400-600 mg/L.hr. Will continue to follow and obtain a level as needed to guide further dosing.     Thank you,   Shola Hickman, PharmD  Pharmacy Resident  3/15/2021  14:58 EDT

## 2021-03-15 NOTE — PLAN OF CARE
Goal Outcome Evaluation:   Patient remains confused to time and situation reorientation provided. NPO at midnight for surgery in the AM. Patient complained of chest pain during shift. See charting. Zeinab WOODWARD aware. No distress noted will continue to follow plan of care.

## 2021-03-15 NOTE — THERAPY EVALUATION
Acute Care - Physical Therapy Initial Evaluation   [place]     Patient Name: Debra Resendiz  : 1945  MRN: [id number]  Today's Date: 3/15/2021   Onset of Illness/Injury or Date of Surgery: 21 (admit date)       PT Assessment (last 12 hours)      PT Evaluation and Treatment     Row Name 03/15/21 1354          Physical Therapy Time and Intention    Subjective Information  complains of;weakness;fatigue;pain  -CT     Document Type  evaluation  -CT     Mode of Treatment  physical therapy  -CT     Patient Effort  adequate  -CT     Symptoms Noted During/After Treatment  fatigue;increased pain  -CT     Comment  Pt tolerated evaluation fairly well. Pt is a resident at nursing home and is mobile to/from chair. Pt declined out of bed today.   -CT     Row Name 03/15/21 8630          General Information    Patient Profile Reviewed  yes  -CT     Onset of Illness/Injury or Date of Surgery  21 admit date  -CT     Referring Physician  Behbahani  -CT     Patient Observations  alert;cooperative  -CT     Prior Level of Function  mod assist:;max assist:  -CT     Equipment Currently Used at Home  walker, rolling;wheelchair  -CT     Existing Precautions/Restrictions  fall  -CT     Limitations/Impairments  safety/cognitive  -CT     Equipment Issued to Patient  gait belt  -CT     Risks Reviewed  patient:;LOB;nausea/vomiting;dizziness;increased discomfort;change in vital signs;increased drainage;lines disloged  -CT     Benefits Reviewed  patient:;improve function;increase independence;increase strength;increase balance;decrease pain;decrease risk of DVT;improve skin integrity;increase knowledge  -CT     Barriers to Rehab  medically complex;previous functional deficit;cognitive status  -CT     Row Name 03/15/21 1355          Previous Level of Function/Home Environm    Household Ambulation, Premorbid Functional Level  needs assistance for safe performance;needs assistive device for safe performance  -CT     Row Name 03/15/21  1354          Living Environment    Current Living Arrangements  extended care facility  -CT     Lives With  facility resident  -CT     Row Name 03/15/21 1354          Cognition    Affect/Mental Status (Cognitive)  confused  -CT     Orientation Status (Cognition)  oriented to;person  -CT     Follows Commands (Cognition)  delayed response/completion;increased processing time needed  -CT     Row Name 03/15/21 1354          Range of Motion Comprehensive    Comment, General Range of Motion  BLE grossly WFL  -CT     Row Name 03/15/21 1354          Strength Comprehensive (MMT)    Comment, General Manual Muscle Testing (MMT) Assessment  does not follow commands for formal testing  -CT     Row Name 03/15/21 1354          Mobility    Extremity Weight-bearing Status  left upper extremity  -CT     Left Upper Extremity (Weight-bearing Status)  non weight-bearing (NWB) elbow fracture awaiting surgery  -CT     Row Name 03/15/21 1354          Bed Mobility    Bed Mobility  rolling left;rolling right;scooting/bridging;supine-sit;sit-supine  -CT     Rolling Left [place] (Bed Mobility)  maximum assist (25% patient effort)  -CT     Rolling Right [place] (Bed Mobility)  maximum assist (25% patient effort)  -CT     Supine-Sit [place] (Bed Mobility)  dependent (less than 25% patient effort)  -CT     Sit-Supine [place] (Bed Mobility)  dependent (less than 25% patient effort)  -CT     Bed Mobility, Safety Issues  cognitive deficits limit understanding;decreased use of arms for pushing/pulling;decreased use of legs for bridging/pushing  -CT     Assistive Device (Bed Mobility)  bed rails;draw sheet  -CT     Row Name 03/15/21 1354          Transfers    Comment (Transfers)  unable at time of eval  -CT     Row Name 03/15/21 1354          Gait/Stairs (Locomotion)    Comment (Gait/Stairs)  unable at time of eval  -CT     Row Name 03/15/21 1354          Safety Issues, Functional Mobility    Safety Issues Affecting Function  "(Mobility)  ability to follow commands;awareness of need for assistance;insight into deficits/self-awareness;safety precaution awareness;safety precautions follow-through/compliance  -CT     Row Name 03/15/21 1358          Balance    Balance Assessment  sitting static balance  -CT     Static Sitting Balance  mild impairment  -CT     Row Name 03/15/21 1353          Coping    Observed Emotional State  calm;cooperative  -CT     Row Name 03/15/21 1353          Plan of Care Review    Plan of Care Reviewed With  patient  -CT     Row Name 03/15/21 [facility]          Physical Therapy Goals    Bed Mobility Goal Selection (PT)  bed mobility, PT goal 1  -CT     Transfer Goal Selection (PT)  transfer, PT goal 1  -CT     Row Name 03/15/21 8966          Bed Mobility Goal 1 (PT)    Activity/Assistive Device (Bed Mobility Goal 1, PT)  bed mobility activities, all  -CT     [facility] Level/Cues Needed (Bed Mobility Goal 1, PT)  moderate assist (50-74% patient effort)  -CT     Time Frame (Bed Mobility Goal 1, PT)  by discharge  -CT     Row Name 03/15/21 1410          Transfer Goal 1 (PT)    Activity/Assistive Device (Transfer Goal 1, PT)  sit-to-stand/stand-to-sit;bed-to-chair/chair-to-bed  -CT     [facility] Level/Cues Needed (Transfer Goal 1, PT)  maximum assist (25-49% patient effort)  -CT     Time Frame (Transfer Goal 1, PT)  by discharge  -CT     Row Name 03/15/21 4794          Positioning and Restraints    Pre-Treatment Position  in bed  -CT     Post Treatment Position  bed  -CT     In Bed  supine;call light within reach;encouraged to call for assist;exit alarm on;side rails up x3  -CT     Row Name 03/15/21 7111          Therapy Assessment/Plan (PT)    Patient/Family Therapy Goals Statement (PT)  Pt goals are to \"get better\"  -CT     Functional Level at Time of Evaluation (PT)  Max/Dep unable to ambulate  -CT     PT Diagnosis (PT)  impaired functional mobility  -CT     Rehab Potential (PT)  fair, will monitor progress closely  " -CT     Criteria for Skilled Interventions Met (PT)  yes;skilled treatment is necessary  -CT     Predicted Duration of Therapy Intervention (PT)  length of stay  -CT     Row Name 03/15/21 1354          Therapy Plan Review/Discharge Plan (PT)    Therapy Plan Review (PT)  evaluation/treatment results reviewed;care plan/treatment goals reviewed;risks/benefits reviewed;current/potential barriers reviewed;participants voiced agreement with care plan;participants included;patient  -CT       User Key  (r) = Recorded By, (t) = Taken By, (c) = Cosigned By    Initials Name Provider Type    CT Brissa Petersen, JAXON Physical Therapist        Physical Therapy Education                 Title: PT OT SLP Therapies (In Progress)     Topic: Physical Therapy (In Progress)     Point: Mobility training (In Progress)     Learning Progress Summary           Patient Acceptance, E,TB, NR by CT at 3/15/2021 1513    Acceptance, E, VU by EB at 3/15/2021 0902    Acceptance, E,TB, VU,NR by MC at 3/15/2021 0059                   Point: Home exercise program (In Progress)     Learning Progress Summary           Patient Acceptance, E,TB, NR by CT at 3/15/2021 1513    Acceptance, E, VU by EB at 3/15/2021 0902    Acceptance, E,TB, VU,NR by MC at 3/15/2021 0059                   Point: Body mechanics (In Progress)     Learning Progress Summary           Patient Acceptance, E,TB, NR by CT at 3/15/2021 1513    Acceptance, E, VU by EB at 3/15/2021 0902    Acceptance, E,TB, VU,NR by MC at 3/15/2021 0059                   Point: Precautions (In Progress)     Learning Progress Summary           Patient Acceptance, E,TB, NR by CT at 3/15/2021 1513    Acceptance, E, VU by EB at 3/15/2021 0902    Acceptance, E,TB, VU,NR by  at 3/15/2021 0059                               User Key     Initials Effective Dates Name Provider Type Discipline    EB 06/16/16 -  Elizabeth Pereyra, RN Registered Nurse Nurse    CT 04/03/18 -  Brissa Petersen PT Physical  Therapist PT     09/06/18 -  Collett, Morgan, RN Registered Nurse Nurse              PT Recommendation and Plan  Anticipated Discharge Disposition (PT): skilled nursing facility  Planned Therapy Interventions (PT): balance training, bed mobility training, gait training, home exercise program, manual therapy techniques, motor coordination training, neuromuscular re-education, patient/family education, postural re-education, strengthening, transfer training  Therapy Frequency (PT): 2 times/wk (2-5 times/wk )  Plan of Care Reviewed With: patient       Time Calculation:   PT Charges     Row Name 03/15/21 1513             Time Calculation    PT Received On  03/15/21  -CT      PT Goal Re-Cert Due Date  03/29/21  -CT        User Key  (r) = Recorded By, (t) = Taken By, (c) = Cosigned By    Initials Name Provider Type    CT Brissa Petersen, JAXON Physical Therapist        Therapy Charges for Today     Code Description Service Date Service Provider Modifiers Qty    [id number] HC PT EVAL MOD COMPLEXITY 4 3/15/2021 Brissa Petersen, PT GP 1               Brissa Petersen PT  3/15/2021

## 2021-03-15 NOTE — THERAPY EVALUATION
Acute Care - Occupational Therapy Initial Evaluation   [place]     Patient Name: Debra Resendiz  : 1945  MRN: [id number]  Today's Date: 3/15/2021             Admit Date: 3/13/2021       ICD-10-CM ICD-9-CM   1. Elbow fracture, left, open, initial encounter  S42.402B 812.50     Patient Active Problem List   Diagnosis   • Closed displaced comminuted fracture of shaft of left humerus   • Elbow fracture, left, open, initial encounter   • Former smoker   • Nursing home resident     No past medical history on file.  Past Surgical History:   Procedure Laterality Date   • HIP SURGERY     • HYSTERECTOMY     • ORIF HUMERUS FRACTURE Left 2018    Procedure: LEFT HUMERUS PROXIMAL OPEN REDUCTION INTERNAL FIXATION;  Surgeon: Dani Aburto MD;  Location: [facility];  Service: Orthopedics   • TUBAL ABDOMINAL LIGATION              OT ASSESSMENT FLOWSHEET (last 12 hours)      OT Evaluation and Treatment     Row Name 03/15/21 1024                   OT Time and Intention    Document Type  evaluation  -KR        Mode of Treatment  occupational therapy  -KR        Patient Effort  adequate  -KR           General Information    General Observations of Patient  Pt. presents pleasant/cooperative with noted confusion/disorientation. LUE immobilized at this time due to injury and casting. Distal digit ROM WFL without edema.   -KR           Cognition    Affect/Mental Status (Cognitive)  confused pleasannt, cooperative  -KR        Orientation Status (Cognition)  oriented to;person  -KR        Follows Commands (Cognition)  delayed response/completion;increased processing time needed  -KR           Range of Motion Comprehensive    Comment, General Range of Motion  LUE immobilized at this time with distal digit ROM WFL under conditions/casting, RUE WFL  -KR           Strength Comprehensive (MMT)    Comment, General Manual Muscle Testing (MMT) Assessment  RUE 3/, LUE deferred  -KR           Activities of Daily Living    BADL  Assessment/Intervention  bathing;upper body dressing;lower body dressing;grooming;toileting;feeding  -KR           Bathing Assessment/Intervention    [place] Level (Bathing)  bathing skills;dependent (less than 25% patient effort)  -KR           Upper Body Dressing Assessment/Training    [place] Level (Upper Body Dressing)  upper body dressing skills;dependent (less than 25% patient effort)  -KR           Lower Body Dressing Assessment/Training    [place] Level (Lower Body Dressing)  lower body dressing skills;dependent (less than 25% patient effort)  -KR           Grooming Assessment/Training    [place] Level (Grooming)  grooming skills;dependent (less than 25% patient effort)  -KR           Self-Feeding Assessment/Training    [place] Level (Feeding)  feeding skills;maximum assist (25% patient effort)  -KR        Comment (Feeding)  assessed for fxl components of self feeding only. No food/drink presented at this time.   -KR           Toileting Assessment/Training    [place] Level (Toileting)  toileting skills;dependent (less than 25% patient effort)  -KR           OT Goals    Self-Feeding Goal Selection (OT)  self-feeding, OT goal 1  -KR        Activity Tolerance Goal Selection (OT)  activity tolerance, OT goal 1  -KR           Self-Feeding Goal 1 (OT)    Activity/Device (Self-Feeding Goal 1, OT)  self-feeding skills, all  -KR        [place] Level/Cues Needed (Self-Feeding Goal 1, OT)  minimum assist (75% or more patient effort)  -KR        Time Frame (Self-Feeding Goal 1, OT)  by discharge  -KR            Activity Tolerance Goal 1 (OT)    Activity Tolerance Goal 1 (OT)  Increase to assist in performance of self care skills  -KR        Activity Level (Endurance Goal 1, OT)  15 min activity  -KR        Time Frame (Activity Tolerance Goal 1, OT)  by discharge  -KR           Therapy Assessment/Plan (OT)    Criteria for Skilled Therapeutic Interventions Met (OT)  yes  -KR         Planned Therapy Interventions (OT)  activity tolerance training;BADL retraining  -YONIS           Therapy Plan Review/Discharge Plan (OT)    Anticipated Discharge Disposition (OT)  skilled nursing facility  -KR          User Key  (r) = Recorded By, (t) = Taken By, (c) = Cosigned By    Initials Name Effective Dates    Viraj Gibbons OT 04/03/18 -                OT Recommendation and Plan  Planned Therapy Interventions (OT): activity tolerance training, BADL retraining           Time Calculation:     Therapy Charges for Today     Code Description Service Date Service Provider Modifiers Qty    [id number]  OT EVAL HIGH COMPLEXITY 4 3/15/2021 Viraj Ignacio OT GO 1               Viraj Ignacio OT  3/15/2021

## 2021-03-15 NOTE — PROGRESS NOTES
Discharge Planning Assessment   [place]     Patient Name: Debra Resendiz  MRN: [id number]  Today's Date: 3/15/2021    Admit Date: 3/13/2021      Discharge Plan     Row Name 03/15/21 1140       Plan    Plan  Pt admitted on 3/13/21 from [facility].  Pt has a skilled bed reserved at [facility] for 11 days per Loree.  SS will follow and assist with discharge back to nursing home.        VANDANA LevineW

## 2021-03-15 NOTE — PROGRESS NOTES
Assisted By: Heather ENGLISH    CC: Follow-up on open left elbow fracture    Interview History/HPI: Patient is sitting up on room air without complaints of chest pain or shortness of breath, son is also at bedside.  She states she is still having some significant pain in her left arm where she has the fracture.  Apparently the fracture has been delayed until tomorrow.  Patient has had multiple fractures in the past and according to the son is not had any difficulty with surgery.  Patient did not have syncopal episode, she forgot asked the nurse to help her back to bed and apparently fell.  She has history of Parkinson's disease.  She has been on Eliquis in the past for DVT prophylaxis but this has been held in anticipation of surgery.      Vitals:    03/15/21 1100   BP: 124/62   Pulse: 83   Resp: 18   Temp: 98 °F (36.7 °C)   SpO2: 98%         Intake/Output Summary (Last 24 hours) at 3/15/2021 1315  Last data filed at 3/15/2021 0826  Gross per 24 hour   Intake 1711.93 ml   Output 200 ml   Net 1511.93 ml       EXAM: Awake alert and in no distress, mild tremor noted, she can move her left fingers well the left arm is in a cast lungs are clear, no rhonchi rales or wheezing, heart regular rate and rhythm without murmur rub or gallop.  Abdomen is soft benign, extremities without edema      EKG:     Tele:     LABS:   Lab Results (last 48 hours)     Procedure Component Value Units Date/Time    Urine Culture - Urine, Urine, Clean Catch [[id number]]  (Abnormal)  (Susceptibility) Collected: 03/13/21 0448    Specimen: Urine, Clean Catch Updated: 03/15/21 1207     Urine Culture >100,000 CFU/mL Escherichia coli ESBL     Comment: Consider infectious disease consult.  Susceptibility results may not correlate to clinical outcomes.       Susceptibility      Escherichia coli ESBL      SO      Amikacin Susceptible      Ertapenem Susceptible      Gentamicin Resistant      Levofloxacin Resistant      Meropenem Susceptible      Nitrofurantoin  Susceptible      Piperacillin + Tazobactam Susceptible      Tetracycline Resistant      Tobramycin Intermediate      Trimethoprim + Sulfamethoxazole Resistant               Linear View                   Blood Culture - Blood, Arm, Right [[id number]] Collected: 03/13/21 0502    Specimen: Blood from Arm, Right Updated: 03/15/21 0630     Blood Culture No growth at 2 days    Blood Culture - Blood, Hand, Left [[id number]] Collected: 03/13/21 0502    Specimen: Blood from Hand, Left Updated: 03/15/21 0630     Blood Culture No growth at 2 days    Troponin [[id number]]  (Normal) Collected: 03/14/21 2154    Specimen: Blood Updated: 03/14/21 2219     Troponin T <0.010 ng/mL     Narrative:      Troponin T Reference Range:  <= 0.03 ng/mL-   Negative for AMI  >0.03 ng/mL-     Abnormal for myocardial necrosis.  Clinicians would have to utilize clinical acumen, EKG, Troponin and serial changes to determine if it is an Acute Myocardial Infarction or myocardial injury due to an underlying chronic condition.       Results may be falsely decreased if patient taking Biotin.      COVID PRE-OP / PRE-PROCEDURE SCREENING ORDER (NO ISOLATION) - Swab, Nasopharynx [[id number]]  (Normal) Collected: 03/14/21 1802    Specimen: Swab from Nasopharynx Updated: 03/14/21 1844    Narrative:      The following orders were created for panel order COVID PRE-OP / PRE-PROCEDURE SCREENING ORDER (NO ISOLATION) - Swab, Nasopharynx.  Procedure                               Abnormality         Status                     ---------                               -----------         ------                     COVID-19,CEPHEID,COR/DAVID...[[id number]]  Normal              Final result                 Please view results for these tests on the individual orders.    COVID-19,CEPHEID,COR/DAVID/PAD IN-HOUSE(OR EMERGENT/ADD-ON),NP SWAB IN TRANSPORT MEDIA 3-4 HR TAT, RT-PCR - Swab, Nasopharynx [[id number]]  (Normal) Collected: 03/14/21 1802    Specimen: Swab from Nasopharynx  Updated: 03/14/21 1844     COVID19 Not Detected    Narrative:      Fact sheet for providers: https://www.fda.gov/media/712277/download     Fact sheet for patients: https://www.fda.gov/media/435943/download    Blood Gas, Venous With Co-Ox [[id number]]  (Abnormal) Collected: 03/14/21 1359    Specimen: Venous Blood Updated: 03/14/21 1405     Site Lab     pH, Venous 7.394 pH Units      pCO2, Venous 43.0 mm Hg      pO2, Venous 46.9 mm Hg      HCO3, Venous 26.2 mmol/L      Base Excess, Venous 1.1 mmol/L      Hemoglobin, Blood Gas 11.8 g/dL      Comment: 84 Value below reference range        CO2 Content 27.5 mmol/L      Temperature 37.0 C      Barometric Pressure for Blood Gas 732 mmHg      Modality Room Air     FIO2 21 %      Ventilator Mode NA     Collected by [id number]     Comment: Meter: [id number]     :  [id number]        Oxyhemoglobin Venous 78.6 %      Comment: 83 Value above reference range        Carboxyhemoglobin Venous 1.5 %      Methemoglobin Venous <1.0 %      Comment: 94 Value below reportable range < 1.0       Lactic Acid, Plasma [[id number]]  (Normal) Collected: 03/14/21 1305    Specimen: Blood Updated: 03/14/21 1333     Lactate 1.4 mmol/L     Basic Metabolic Panel [[id number]]  (Abnormal) Collected: 03/14/21 0109    Specimen: Blood Updated: 03/14/21 0843     Glucose 74 mg/dL      BUN 17 mg/dL      Creatinine 0.41 mg/dL      Sodium 140 mmol/L      Potassium 3.7 mmol/L      Comment: Slight hemolysis detected by analyzer. Results may be affected.        Chloride 111 mmol/L      CO2 14.1 mmol/L      Calcium 7.8 mg/dL      eGFR Non African Amer >150 mL/min/1.73      BUN/Creatinine Ratio 41.5     Anion Gap 14.9 mmol/L     Narrative:      GFR Normal >60  Chronic Kidney Disease <60  Kidney Failure <15      Magnesium [[id number]]  (Normal) Collected: 03/14/21 0109    Specimen: Blood Updated: 03/14/21 0358     Magnesium 1.7 mg/dL           Radiology:    Imaging Results (Last 72 Hours)     Procedure Component  Value Units Date/Time    XR Chest 1 View [[id number]] Collected: 03/13/21 1045     Updated: 03/13/21 1047    Narrative:      EXAM:    XR Chest, 1 View     EXAM DATE:    3/13/2021 5:37 AM     CLINICAL HISTORY:    Pre operative clearance     TECHNIQUE:    Frontal view of the chest.     COMPARISON:    04/06/2018     FINDINGS:    Lungs:  Stable chronic appearing lung changes.    Pleural space:  Unremarkable.  No pneumothorax.    Heart:  Unremarkable.  No cardiomegaly.    Mediastinum:  Unremarkable.    Bones/joints:  Unremarkable.       Impression:      Stable chronic lung changes. No acute cardiopulmonary findings  identified.     This report was finalized on 3/13/2021 10:45 AM by Dr. Viraj Appiah MD.       XR Elbow 2 View Left [[id number]] Collected: 03/13/21 0545     Updated: 03/13/21 0548    Narrative:      CR Elbow 2 Vws LT    INDICATION:   Evaluate olecranon fracture    COMPARISON:   None available    FINDINGS:   2 views of the left elbow.  There is a fracture visible in the dorsal cortex of the proximal ulna. That fracture is about 3 cm from the proximal end of the bone. It is not possible on this study to see the entire fracture of the probably passes through  the bone into the olecranon fossa  No bone erosion or destruction.  No foreign body.      Impression:      There is a defect in the dorsal cortex in the proximal ulna. Most likely there is a complete fracture through into the olecranon fossa but is not clearly visible on these images    Signer Name: Dane Molina MD   Signed: 3/13/2021 5:45 AM   Workstation Name: [facility]    [facility]          Results for orders placed during the hospital encounter of 04/06/18    Adult Transthoracic Echo Complete W/ Cont if Necessary Per Protocol    Interpretation Summary  · Left ventricular systolic function is hyperdynamic (EF > 70%). Estimated EF appears to be in the range of 66 - 70%. Normal left ventricular cavity size and wall thickness  noted.  · The study is technically suboptimal for diagnosis.      Assessment/Plan:   Left elbow fracture, certainly this is open which is described this to be a risk for osteomyelitis.  Patient is to have surgery tomorrow, patient is on Azactam but with the risk of osteomyelitis in combination with ESBL UTI, have asked infectious disease to weigh in on best antibiotic choice.    DVT prophylaxis, she is on SCUDs    Parkinson's disease, stable at present    Low bicarb yesterday with elevated anion gap but compensated by pH, I have ordered a stat BMP is 1 was not done today, will evaluate when back.    ESBL UTI, await ID input.    Disposition skilled nursing facility    Sang Epps MD

## 2021-03-16 ENCOUNTER — ANESTHESIA EVENT (OUTPATIENT)
Dept: PERIOP | Facility: HOSPITAL | Age: 76
End: 2021-03-16

## 2021-03-16 ENCOUNTER — APPOINTMENT (OUTPATIENT)
Dept: GENERAL RADIOLOGY | Facility: HOSPITAL | Age: 76
End: 2021-03-16

## 2021-03-16 ENCOUNTER — ANESTHESIA (OUTPATIENT)
Dept: PERIOP | Facility: HOSPITAL | Age: 76
End: 2021-03-16

## 2021-03-16 LAB
ALBUMIN SERPL-MCNC: 2.97 G/DL (ref 3.5–5.2)
ALBUMIN/GLOB SERPL: 0.9 G/DL
ALP SERPL-CCNC: 96 U/L (ref 39–117)
ALT SERPL W P-5'-P-CCNC: 14 U/L (ref 1–33)
ANION GAP SERPL CALCULATED.3IONS-SCNC: 6.3 MMOL/L (ref 5–15)
AST SERPL-CCNC: 21 U/L (ref 1–32)
BASOPHILS # BLD AUTO: 0.08 10*3/MM3 (ref 0–0.2)
BASOPHILS NFR BLD AUTO: 0.9 % (ref 0–1.5)
BILIRUB SERPL-MCNC: 0.3 MG/DL (ref 0–1.2)
BUN SERPL-MCNC: 14 MG/DL (ref 8–23)
BUN/CREAT SERPL: 29.8 (ref 7–25)
CALCIUM SPEC-SCNC: 9.2 MG/DL (ref 8.6–10.5)
CHLORIDE SERPL-SCNC: 105 MMOL/L (ref 98–107)
CO2 SERPL-SCNC: 22.7 MMOL/L (ref 22–29)
CREAT SERPL-MCNC: 0.47 MG/DL (ref 0.57–1)
CRP SERPL-MCNC: 1.27 MG/DL (ref 0–0.5)
DEPRECATED RDW RBC AUTO: 50.2 FL (ref 37–54)
EOSINOPHIL # BLD AUTO: 0.24 10*3/MM3 (ref 0–0.4)
EOSINOPHIL NFR BLD AUTO: 2.8 % (ref 0.3–6.2)
ERYTHROCYTE [DISTWIDTH] IN BLOOD BY AUTOMATED COUNT: 13.6 % (ref 12.3–15.4)
GFR SERPL CREATININE-BSD FRML MDRD: 130 ML/MIN/1.73
GLOBULIN UR ELPH-MCNC: 3.1 GM/DL
GLUCOSE BLDC GLUCOMTR-MCNC: 94 MG/DL (ref 70–130)
GLUCOSE SERPL-MCNC: 88 MG/DL (ref 65–99)
HCT VFR BLD AUTO: 35.4 % (ref 34–46.6)
HGB BLD-MCNC: 11.2 G/DL (ref 12–15.9)
IMM GRANULOCYTES # BLD AUTO: 0.04 10*3/MM3 (ref 0–0.05)
IMM GRANULOCYTES NFR BLD AUTO: 0.5 % (ref 0–0.5)
LYMPHOCYTES # BLD AUTO: 1.92 10*3/MM3 (ref 0.7–3.1)
LYMPHOCYTES NFR BLD AUTO: 22.3 % (ref 19.6–45.3)
MCH RBC QN AUTO: 31.9 PG (ref 26.6–33)
MCHC RBC AUTO-ENTMCNC: 31.6 G/DL (ref 31.5–35.7)
MCV RBC AUTO: 100.9 FL (ref 79–97)
MONOCYTES # BLD AUTO: 0.75 10*3/MM3 (ref 0.1–0.9)
MONOCYTES NFR BLD AUTO: 8.7 % (ref 5–12)
NEUTROPHILS NFR BLD AUTO: 5.57 10*3/MM3 (ref 1.7–7)
NEUTROPHILS NFR BLD AUTO: 64.8 % (ref 42.7–76)
NRBC BLD AUTO-RTO: 0 /100 WBC (ref 0–0.2)
PLATELET # BLD AUTO: 321 10*3/MM3 (ref 140–450)
PMV BLD AUTO: 9.5 FL (ref 6–12)
POTASSIUM SERPL-SCNC: 4.4 MMOL/L (ref 3.5–5.2)
PROT SERPL-MCNC: 6.1 G/DL (ref 6–8.5)
QT INTERVAL: 358 MS
QTC INTERVAL: 426 MS
RBC # BLD AUTO: 3.51 10*6/MM3 (ref 3.77–5.28)
SODIUM SERPL-SCNC: 134 MMOL/L (ref 136–145)
WBC # BLD AUTO: 8.6 10*3/MM3 (ref 3.4–10.8)

## 2021-03-16 PROCEDURE — 25010000002 FENTANYL CITRATE (PF) 100 MCG/2ML SOLUTION: Performed by: NURSE ANESTHETIST, CERTIFIED REGISTERED

## 2021-03-16 PROCEDURE — C1713 ANCHOR/SCREW BN/BN,TIS/BN: HCPCS | Performed by: ORTHOPAEDIC SURGERY

## 2021-03-16 PROCEDURE — 25010000002 PROPOFOL 10 MG/ML EMULSION: Performed by: NURSE ANESTHETIST, CERTIFIED REGISTERED

## 2021-03-16 PROCEDURE — 25010000002 ONDANSETRON PER 1 MG: Performed by: NURSE ANESTHETIST, CERTIFIED REGISTERED

## 2021-03-16 PROCEDURE — 86140 C-REACTIVE PROTEIN: CPT | Performed by: INTERNAL MEDICINE

## 2021-03-16 PROCEDURE — 25010000002 MIDAZOLAM PER 1 MG: Performed by: ANESTHESIOLOGY

## 2021-03-16 PROCEDURE — 99232 SBSQ HOSP IP/OBS MODERATE 35: CPT | Performed by: INTERNAL MEDICINE

## 2021-03-16 PROCEDURE — 76000 FLUOROSCOPY <1 HR PHYS/QHP: CPT | Performed by: RADIOLOGY

## 2021-03-16 PROCEDURE — 82962 GLUCOSE BLOOD TEST: CPT

## 2021-03-16 PROCEDURE — 76000 FLUOROSCOPY <1 HR PHYS/QHP: CPT

## 2021-03-16 PROCEDURE — 80053 COMPREHEN METABOLIC PANEL: CPT | Performed by: INTERNAL MEDICINE

## 2021-03-16 PROCEDURE — 85025 COMPLETE CBC W/AUTO DIFF WBC: CPT | Performed by: INTERNAL MEDICINE

## 2021-03-16 PROCEDURE — 0PSL04Z REPOSITION LEFT ULNA WITH INTERNAL FIXATION DEVICE, OPEN APPROACH: ICD-10-PCS | Performed by: ORTHOPAEDIC SURGERY

## 2021-03-16 PROCEDURE — 25010000002 ERTAPENEM PER 500 MG: Performed by: ORTHOPAEDIC SURGERY

## 2021-03-16 DEVICE — SCRW LK S/TAP T8STRDRV 2.7X18MM: Type: IMPLANTABLE DEVICE | Site: ELBOW | Status: FUNCTIONAL

## 2021-03-16 DEVICE — SCRW LK S/TAP T8STRDRV 2.7X40MM: Type: IMPLANTABLE DEVICE | Site: ELBOW | Status: FUNCTIONAL

## 2021-03-16 DEVICE — SCRW CORT S/TAP 3.5X20MM: Type: IMPLANTABLE DEVICE | Site: ELBOW | Status: FUNCTIONAL

## 2021-03-16 DEVICE — IMPLANTABLE DEVICE: Type: IMPLANTABLE DEVICE | Site: ELBOW | Status: FUNCTIONAL

## 2021-03-16 DEVICE — SCRW CORT S/TAP 3.5X18MM: Type: IMPLANTABLE DEVICE | Site: ELBOW | Status: FUNCTIONAL

## 2021-03-16 DEVICE — SCRW LK S/TAP T8STRDRV 2.7X22MM: Type: IMPLANTABLE DEVICE | Site: ELBOW | Status: FUNCTIONAL

## 2021-03-16 DEVICE — SCRW LK S/TAP T8STRDRV 2.7X26MM: Type: IMPLANTABLE DEVICE | Site: ELBOW | Status: FUNCTIONAL

## 2021-03-16 DEVICE — SCRW LK S/TAP T8STRDRV 2.7X20MM: Type: IMPLANTABLE DEVICE | Site: ELBOW | Status: FUNCTIONAL

## 2021-03-16 RX ORDER — ONDANSETRON 2 MG/ML
INJECTION INTRAMUSCULAR; INTRAVENOUS AS NEEDED
Status: DISCONTINUED | OUTPATIENT
Start: 2021-03-16 | End: 2021-03-16 | Stop reason: SURG

## 2021-03-16 RX ORDER — HYDROCODONE BITARTRATE AND ACETAMINOPHEN 7.5; 325 MG/1; MG/1
1 TABLET ORAL EVERY 4 HOURS PRN
Status: DISCONTINUED | OUTPATIENT
Start: 2021-03-16 | End: 2021-03-19 | Stop reason: HOSPADM

## 2021-03-16 RX ORDER — LIDOCAINE HYDROCHLORIDE 20 MG/ML
INJECTION, SOLUTION INFILTRATION; PERINEURAL AS NEEDED
Status: DISCONTINUED | OUTPATIENT
Start: 2021-03-16 | End: 2021-03-16 | Stop reason: SURG

## 2021-03-16 RX ORDER — SODIUM CHLORIDE 0.9 % (FLUSH) 0.9 %
10 SYRINGE (ML) INJECTION AS NEEDED
Status: DISCONTINUED | OUTPATIENT
Start: 2021-03-16 | End: 2021-03-19 | Stop reason: HOSPADM

## 2021-03-16 RX ORDER — HYDROMORPHONE HYDROCHLORIDE 1 MG/ML
0.25 INJECTION, SOLUTION INTRAMUSCULAR; INTRAVENOUS; SUBCUTANEOUS EVERY 4 HOURS PRN
Status: DISCONTINUED | OUTPATIENT
Start: 2021-03-16 | End: 2021-03-19 | Stop reason: HOSPADM

## 2021-03-16 RX ORDER — NALOXONE HCL 0.4 MG/ML
0.4 VIAL (ML) INJECTION
Status: DISCONTINUED | OUTPATIENT
Start: 2021-03-16 | End: 2021-03-19 | Stop reason: HOSPADM

## 2021-03-16 RX ORDER — SODIUM CHLORIDE, SODIUM LACTATE, POTASSIUM CHLORIDE, CALCIUM CHLORIDE 600; 310; 30; 20 MG/100ML; MG/100ML; MG/100ML; MG/100ML
INJECTION, SOLUTION INTRAVENOUS CONTINUOUS PRN
Status: DISCONTINUED | OUTPATIENT
Start: 2021-03-16 | End: 2021-03-16 | Stop reason: SURG

## 2021-03-16 RX ORDER — CEFAZOLIN SODIUM 2 G/50ML
2 SOLUTION INTRAVENOUS
Status: DISCONTINUED | OUTPATIENT
Start: 2021-03-16 | End: 2021-03-16 | Stop reason: HOSPADM

## 2021-03-16 RX ORDER — OXYCODONE HYDROCHLORIDE AND ACETAMINOPHEN 5; 325 MG/1; MG/1
1 TABLET ORAL ONCE AS NEEDED
Status: DISCONTINUED | OUTPATIENT
Start: 2021-03-16 | End: 2021-03-16 | Stop reason: HOSPADM

## 2021-03-16 RX ORDER — ASPIRIN 325 MG
325 TABLET, DELAYED RELEASE (ENTERIC COATED) ORAL DAILY
Status: DISCONTINUED | OUTPATIENT
Start: 2021-03-16 | End: 2021-03-19 | Stop reason: HOSPADM

## 2021-03-16 RX ORDER — ONDANSETRON 4 MG/1
4 TABLET, FILM COATED ORAL EVERY 6 HOURS PRN
Status: DISCONTINUED | OUTPATIENT
Start: 2021-03-16 | End: 2021-03-19 | Stop reason: HOSPADM

## 2021-03-16 RX ORDER — MIDAZOLAM HYDROCHLORIDE 1 MG/ML
0.5 INJECTION INTRAMUSCULAR; INTRAVENOUS
Status: DISCONTINUED | OUTPATIENT
Start: 2021-03-16 | End: 2021-03-16 | Stop reason: HOSPADM

## 2021-03-16 RX ORDER — MAGNESIUM HYDROXIDE 1200 MG/15ML
LIQUID ORAL AS NEEDED
Status: DISCONTINUED | OUTPATIENT
Start: 2021-03-16 | End: 2021-03-16 | Stop reason: HOSPADM

## 2021-03-16 RX ORDER — MIDAZOLAM HYDROCHLORIDE 1 MG/ML
1 INJECTION INTRAMUSCULAR; INTRAVENOUS
Status: DISCONTINUED | OUTPATIENT
Start: 2021-03-16 | End: 2021-03-16 | Stop reason: HOSPADM

## 2021-03-16 RX ORDER — SODIUM CHLORIDE 0.9 % (FLUSH) 0.9 %
10 SYRINGE (ML) INJECTION EVERY 12 HOURS SCHEDULED
Status: DISCONTINUED | OUTPATIENT
Start: 2021-03-16 | End: 2021-03-19 | Stop reason: HOSPADM

## 2021-03-16 RX ORDER — FAMOTIDINE 10 MG/ML
INJECTION, SOLUTION INTRAVENOUS AS NEEDED
Status: DISCONTINUED | OUTPATIENT
Start: 2021-03-16 | End: 2021-03-16 | Stop reason: SURG

## 2021-03-16 RX ORDER — ONDANSETRON 2 MG/ML
4 INJECTION INTRAMUSCULAR; INTRAVENOUS EVERY 6 HOURS PRN
Status: DISCONTINUED | OUTPATIENT
Start: 2021-03-16 | End: 2021-03-19 | Stop reason: HOSPADM

## 2021-03-16 RX ORDER — SODIUM CHLORIDE, SODIUM LACTATE, POTASSIUM CHLORIDE, CALCIUM CHLORIDE 600; 310; 30; 20 MG/100ML; MG/100ML; MG/100ML; MG/100ML
100 INJECTION, SOLUTION INTRAVENOUS ONCE AS NEEDED
Status: DISCONTINUED | OUTPATIENT
Start: 2021-03-16 | End: 2021-03-16 | Stop reason: HOSPADM

## 2021-03-16 RX ORDER — FENTANYL CITRATE 50 UG/ML
50 INJECTION, SOLUTION INTRAMUSCULAR; INTRAVENOUS
Status: DISCONTINUED | OUTPATIENT
Start: 2021-03-16 | End: 2021-03-16 | Stop reason: HOSPADM

## 2021-03-16 RX ORDER — SODIUM CHLORIDE, SODIUM LACTATE, POTASSIUM CHLORIDE, CALCIUM CHLORIDE 600; 310; 30; 20 MG/100ML; MG/100ML; MG/100ML; MG/100ML
125 INJECTION, SOLUTION INTRAVENOUS ONCE
Status: DISCONTINUED | OUTPATIENT
Start: 2021-03-16 | End: 2021-03-16 | Stop reason: HOSPADM

## 2021-03-16 RX ORDER — FENTANYL CITRATE 50 UG/ML
INJECTION, SOLUTION INTRAMUSCULAR; INTRAVENOUS AS NEEDED
Status: DISCONTINUED | OUTPATIENT
Start: 2021-03-16 | End: 2021-03-16 | Stop reason: SURG

## 2021-03-16 RX ORDER — PROPOFOL 10 MG/ML
VIAL (ML) INTRAVENOUS AS NEEDED
Status: DISCONTINUED | OUTPATIENT
Start: 2021-03-16 | End: 2021-03-16 | Stop reason: SURG

## 2021-03-16 RX ORDER — IPRATROPIUM BROMIDE AND ALBUTEROL SULFATE 2.5; .5 MG/3ML; MG/3ML
3 SOLUTION RESPIRATORY (INHALATION) ONCE AS NEEDED
Status: DISCONTINUED | OUTPATIENT
Start: 2021-03-16 | End: 2021-03-16 | Stop reason: HOSPADM

## 2021-03-16 RX ORDER — DROPERIDOL 2.5 MG/ML
0.62 INJECTION, SOLUTION INTRAMUSCULAR; INTRAVENOUS ONCE AS NEEDED
Status: DISCONTINUED | OUTPATIENT
Start: 2021-03-16 | End: 2021-03-16 | Stop reason: HOSPADM

## 2021-03-16 RX ORDER — SODIUM CHLORIDE 0.9 % (FLUSH) 0.9 %
10 SYRINGE (ML) INJECTION AS NEEDED
Status: DISCONTINUED | OUTPATIENT
Start: 2021-03-16 | End: 2021-03-16 | Stop reason: HOSPADM

## 2021-03-16 RX ORDER — BUPIVACAINE HYDROCHLORIDE 5 MG/ML
INJECTION, SOLUTION EPIDURAL; INTRACAUDAL
Status: COMPLETED | OUTPATIENT
Start: 2021-03-16 | End: 2021-03-16

## 2021-03-16 RX ORDER — ONDANSETRON 2 MG/ML
4 INJECTION INTRAMUSCULAR; INTRAVENOUS AS NEEDED
Status: DISCONTINUED | OUTPATIENT
Start: 2021-03-16 | End: 2021-03-16 | Stop reason: HOSPADM

## 2021-03-16 RX ORDER — SODIUM CHLORIDE 0.9 % (FLUSH) 0.9 %
10 SYRINGE (ML) INJECTION EVERY 12 HOURS SCHEDULED
Status: DISCONTINUED | OUTPATIENT
Start: 2021-03-16 | End: 2021-03-16 | Stop reason: HOSPADM

## 2021-03-16 RX ADMIN — FENTANYL CITRATE 25 MCG: 50 INJECTION INTRAMUSCULAR; INTRAVENOUS at 11:03

## 2021-03-16 RX ADMIN — HYDROCODONE BITARTRATE AND ACETAMINOPHEN 1 TABLET: 7.5; 325 TABLET ORAL at 17:05

## 2021-03-16 RX ADMIN — FAMOTIDINE 20 MG: 10 INJECTION INTRAVENOUS at 09:40

## 2021-03-16 RX ADMIN — DOCUSATE SODIUM 100 MG: 100 CAPSULE, LIQUID FILLED ORAL at 19:53

## 2021-03-16 RX ADMIN — FENTANYL CITRATE 25 MCG: 50 INJECTION INTRAMUSCULAR; INTRAVENOUS at 10:12

## 2021-03-16 RX ADMIN — ERTAPENEM SODIUM 1 G: 1 INJECTION, POWDER, LYOPHILIZED, FOR SOLUTION INTRAMUSCULAR; INTRAVENOUS at 17:04

## 2021-03-16 RX ADMIN — BUSPIRONE HYDROCHLORIDE 15 MG: 10 TABLET ORAL at 17:04

## 2021-03-16 RX ADMIN — BENZTROPINE MESYLATE 1 MG: 1 TABLET ORAL at 19:52

## 2021-03-16 RX ADMIN — FERROUS SULFATE TAB 325 MG (65 MG ELEMENTAL FE) 325 MG: 325 (65 FE) TAB at 19:52

## 2021-03-16 RX ADMIN — FENTANYL CITRATE 50 MCG: 50 INJECTION INTRAMUSCULAR; INTRAVENOUS at 11:45

## 2021-03-16 RX ADMIN — CEFAZOLIN 2 G: 1 INJECTION, POWDER, FOR SOLUTION INTRAMUSCULAR; INTRAVENOUS; PARENTERAL at 10:22

## 2021-03-16 RX ADMIN — HYDROCODONE BITARTRATE AND ACETAMINOPHEN 1 TABLET: 7.5; 325 TABLET ORAL at 22:40

## 2021-03-16 RX ADMIN — SODIUM CHLORIDE, POTASSIUM CHLORIDE, SODIUM LACTATE AND CALCIUM CHLORIDE: 600; 310; 30; 20 INJECTION, SOLUTION INTRAVENOUS at 09:40

## 2021-03-16 RX ADMIN — BUSPIRONE HYDROCHLORIDE 15 MG: 10 TABLET ORAL at 19:52

## 2021-03-16 RX ADMIN — SENNOSIDES 1 TABLET: 8.6 TABLET, FILM COATED ORAL at 19:52

## 2021-03-16 RX ADMIN — EPHEDRINE SULFATE 10 MG: 50 INJECTION, SOLUTION INTRAVENOUS at 09:58

## 2021-03-16 RX ADMIN — MIDAZOLAM HYDROCHLORIDE 2 MG: 1 INJECTION, SOLUTION INTRAMUSCULAR; INTRAVENOUS at 09:22

## 2021-03-16 RX ADMIN — PROPOFOL 50 MG: 10 INJECTION, EMULSION INTRAVENOUS at 09:48

## 2021-03-16 RX ADMIN — ONDANSETRON 4 MG: 2 INJECTION INTRAMUSCULAR; INTRAVENOUS at 11:20

## 2021-03-16 RX ADMIN — LIDOCAINE HYDROCHLORIDE 60 MG: 20 INJECTION, SOLUTION INFILTRATION; PERINEURAL at 09:48

## 2021-03-16 RX ADMIN — BUPIVACAINE HYDROCHLORIDE 20 ML: 5 INJECTION, SOLUTION EPIDURAL; INTRACAUDAL; PERINEURAL at 09:27

## 2021-03-16 RX ADMIN — CITALOPRAM HYDROBROMIDE 10 MG: 20 TABLET ORAL at 19:52

## 2021-03-16 NOTE — ANESTHESIA PROCEDURE NOTES
Airway  Urgency: elective    Date/Time: 3/16/2021 9:50 AM  Airway not difficult    General Information and Staff    Patient location during procedure: OR  CRNA: Brooke Fisher CRNA    Indications and Patient Condition  Indications for airway management: airway protection    Preoxygenated: yes  MILS maintained throughout  Mask difficulty assessment: 1 - vent by mask    Final Airway Details  Final airway type: endotracheal airway      Successful airway: ETT  Cuffed: yes   Successful intubation technique: direct laryngoscopy  Facilitating devices/methods: intubating stylet  Endotracheal tube insertion site: oral  Blade: Cristobal  Blade size: 3  ETT size (mm): 7.0  Cormack-Lehane Classification: grade I - full view of glottis  Placement verified by: chest auscultation and capnometry   Measured from: lips  ETT/EBT  to lips (cm): 18  Number of attempts at approach: 1  Assessment: lips, teeth, and gum same as pre-op and atraumatic intubation

## 2021-03-16 NOTE — ANESTHESIA POSTPROCEDURE EVALUATION
Patient: Debra Resendiz    Procedure Summary     Date: 03/16/21 Room / Location: [facility] OR 04 /  [facility] OR    Anesthesia Start: 0940 Anesthesia Stop: 1148    Procedure: LEFT ELBOW OPEN REDUCTION INTERNAL FIXATION WITH PLATE AND SCREWS (Left Elbow) Diagnosis:       Elbow fracture, left, open, initial encounter      (Elbow fracture, left, open, initial encounter [S42.402B])    Surgeons: Williams Mccollum DO Provider: Gama Hines MD    Anesthesia Type: general ASA Status: 2          Anesthesia Type: general    Vitals  Vitals Value Taken Time   /71 03/16/21 1232   Temp 98.1 °F (36.7 °C) 03/16/21 1237   Pulse 90 03/16/21 1237   Resp 16 03/16/21 1237   SpO2 100 % 03/16/21 1237           Post Anesthesia Care and Evaluation    Patient location during evaluation: PACU  Patient participation: complete - patient participated  Level of consciousness: awake  Pain score: 2  Pain management: adequate  Airway patency: patent  Anesthetic complications: No anesthetic complications  PONV Status: none  Cardiovascular status: acceptable  Respiratory status: acceptable  Hydration status: acceptable

## 2021-03-16 NOTE — PROGRESS NOTES
Discussed with orthopedics, this was not an open fracture, discussed with ID as well, will stop vancomycin.  Continue Invanz for ESBL UTI

## 2021-03-16 NOTE — NURSING NOTE
Pt came back from surgery at this time. No distress noted. Good cap refill. Oxygen placed on by surgery RN. Pt had no request or complaint at this time. PCA in room at this time obtaining vitals.

## 2021-03-16 NOTE — PROGRESS NOTES
Assisted By: Meseret Hodgson RN    CC: Follow-up on left elbow fracture    Interview History/HPI: Patient wants operating room this a.m., she is now returned, she was on 4 L nasal cannula when I saw her turned her down to 3 L as her saturation was 99%.  Patient was drowsy but easily awakened, she states she was having some left arm pain, otherwise no complaints.      Vitals:    03/16/21 1320   BP: 128/67   Pulse: 90   Resp: 20   Temp: 98.2 °F (36.8 °C)   SpO2: 97%         Intake/Output Summary (Last 24 hours) at 3/16/2021 1351  Last data filed at 3/16/2021 1140  Gross per 24 hour   Intake 920 ml   Output 850 ml   Net 70 ml       EXAM: Lungs have bilateral breath sounds diminished at the bases otherwise clear anteriorly the left arm is in a cast, no edema pupils equal face symmetric heart is a regular rate and rhythm no definite murmur heard abdomen is soft benign     Tele: Sinus rhythm    LABS:   Lab Results (last 48 hours)     Procedure Component Value Units Date/Time    POC Glucose Once [[id number]]  (Normal) Collected: 03/16/21 0644    Specimen: Blood Updated: 03/16/21 0657     Glucose 94 mg/dL     Blood Culture - Blood, Arm, Right [[id number]] Collected: 03/13/21 0502    Specimen: Blood from Arm, Right Updated: 03/16/21 0630     Blood Culture No growth at 3 days    Blood Culture - Blood, Hand, Left [[id number]] Collected: 03/13/21 0502    Specimen: Blood from Hand, Left Updated: 03/16/21 0630     Blood Culture No growth at 3 days    Comprehensive Metabolic Panel [[id number]]  (Abnormal) Collected: 03/16/21 0144    Specimen: Blood Updated: 03/16/21 0238     Glucose 88 mg/dL      BUN 14 mg/dL      Creatinine 0.47 mg/dL      Sodium 134 mmol/L      Potassium 4.4 mmol/L      Comment: Slight hemolysis detected by analyzer. Results may be affected.        Chloride 105 mmol/L      CO2 22.7 mmol/L      Calcium 9.2 mg/dL      Total Protein 6.1 g/dL      Albumin 2.97 g/dL      ALT (SGPT) 14 U/L      AST (SGOT) 21 U/L      Alkaline  Phosphatase 96 U/L      Total Bilirubin 0.3 mg/dL      eGFR Non African Amer 130 mL/min/1.73      Globulin 3.1 gm/dL      A/G Ratio 0.9 g/dL      BUN/Creatinine Ratio 29.8     Anion Gap 6.3 mmol/L     Narrative:      GFR Normal >60  Chronic Kidney Disease <60  Kidney Failure <15      C-reactive Protein [[id number]]  (Abnormal) Collected: 03/16/21 0144    Specimen: Blood Updated: 03/16/21 0238     C-Reactive Protein 1.27 mg/dL     CBC & Differential [[id number]]  (Abnormal) Collected: 03/16/21 0144    Specimen: Blood Updated: 03/16/21 0216    Narrative:      The following orders were created for panel order CBC & Differential.  Procedure                               Abnormality         Status                     ---------                               -----------         ------                     CBC Auto Differential[[id number]]        Abnormal            Final result                 Please view results for these tests on the individual orders.    CBC Auto Differential [[id number]]  (Abnormal) Collected: 03/16/21 0144    Specimen: Blood Updated: 03/16/21 0216     WBC 8.60 10*3/mm3      RBC 3.51 10*6/mm3      Hemoglobin 11.2 g/dL      Hematocrit 35.4 %      .9 fL      MCH 31.9 pg      MCHC 31.6 g/dL      RDW 13.6 %      RDW-SD 50.2 fl      MPV 9.5 fL      Platelets 321 10*3/mm3      Neutrophil % 64.8 %      Lymphocyte % 22.3 %      Monocyte % 8.7 %      Eosinophil % 2.8 %      Basophil % 0.9 %      Immature Grans % 0.5 %      Neutrophils, Absolute 5.57 10*3/mm3      Lymphocytes, Absolute 1.92 10*3/mm3      Monocytes, Absolute 0.75 10*3/mm3      Eosinophils, Absolute 0.24 10*3/mm3      Basophils, Absolute 0.08 10*3/mm3      Immature Grans, Absolute 0.04 10*3/mm3      nRBC 0.0 /100 WBC     Basic Metabolic Panel [[id number]]  (Abnormal) Collected: 03/15/21 1331    Specimen: Blood Updated: 03/15/21 1433     Glucose 86 mg/dL      BUN 14 mg/dL      Creatinine 0.48 mg/dL      Sodium 131 mmol/L      Potassium 5.0 mmol/L       Comment: Specimen hemolyzed.  Results may be affected. 2+ Hemolysis          Chloride 105 mmol/L      CO2 18.1 mmol/L      Calcium 9.3 mg/dL      eGFR Non African Amer 126 mL/min/1.73      BUN/Creatinine Ratio 29.2     Anion Gap 7.9 mmol/L     Narrative:      GFR Normal >60  Chronic Kidney Disease <60  Kidney Failure <15      CBC & Differential [[id number]]  (Abnormal) Collected: 03/15/21 1331    Specimen: Blood Updated: 03/15/21 1408    Narrative:      The following orders were created for panel order CBC & Differential.  Procedure                               Abnormality         Status                     ---------                               -----------         ------                     CBC Auto Differential[[id number]]        Abnormal            Final result                 Please view results for these tests on the individual orders.    CBC Auto Differential [[id number]]  (Abnormal) Collected: 03/15/21 1331    Specimen: Blood Updated: 03/15/21 1408     WBC 9.78 10*3/mm3      RBC 4.19 10*6/mm3      Hemoglobin 13.1 g/dL      Hematocrit 43.8 %      .5 fL      MCH 31.3 pg      MCHC 29.9 g/dL      RDW 13.6 %      RDW-SD 52.1 fl      MPV 10.0 fL      Platelets 309 10*3/mm3      Neutrophil % 69.5 %      Lymphocyte % 19.7 %      Monocyte % 6.9 %      Eosinophil % 2.7 %      Basophil % 0.7 %      Immature Grans % 0.5 %      Neutrophils, Absolute 6.80 10*3/mm3      Lymphocytes, Absolute 1.93 10*3/mm3      Monocytes, Absolute 0.67 10*3/mm3      Eosinophils, Absolute 0.26 10*3/mm3      Basophils, Absolute 0.07 10*3/mm3      Immature Grans, Absolute 0.05 10*3/mm3      nRBC 0.0 /100 WBC     Urine Culture - Urine, Urine, Clean Catch [[id number]]  (Abnormal)  (Susceptibility) Collected: 03/13/21 0448    Specimen: Urine, Clean Catch Updated: 03/15/21 1207     Urine Culture >100,000 CFU/mL Escherichia coli ESBL     Comment: Consider infectious disease consult.  Susceptibility results may not correlate to clinical  outcomes.       Susceptibility      Escherichia coli ESBL      SO      Amikacin Susceptible      Ertapenem Susceptible      Gentamicin Resistant      Levofloxacin Resistant      Meropenem Susceptible      Nitrofurantoin Susceptible      Piperacillin + Tazobactam Susceptible      Tetracycline Resistant      Tobramycin Intermediate      Trimethoprim + Sulfamethoxazole Resistant               Linear View                   Troponin [[id number]]  (Normal) Collected: 03/14/21 2154    Specimen: Blood Updated: 03/14/21 2219     Troponin T <0.010 ng/mL     Narrative:      Troponin T Reference Range:  <= 0.03 ng/mL-   Negative for AMI  >0.03 ng/mL-     Abnormal for myocardial necrosis.  Clinicians would have to utilize clinical acumen, EKG, Troponin and serial changes to determine if it is an Acute Myocardial Infarction or myocardial injury due to an underlying chronic condition.       Results may be falsely decreased if patient taking Biotin.      COVID PRE-OP / PRE-PROCEDURE SCREENING ORDER (NO ISOLATION) - Swab, Nasopharynx [[id number]]  (Normal) Collected: 03/14/21 1802    Specimen: Swab from Nasopharynx Updated: 03/14/21 1844    Narrative:      The following orders were created for panel order COVID PRE-OP / PRE-PROCEDURE SCREENING ORDER (NO ISOLATION) - Swab, Nasopharynx.  Procedure                               Abnormality         Status                     ---------                               -----------         ------                     COVID-19,CEPHEID,COR/DAVID...[[id number]]  Normal              Final result                 Please view results for these tests on the individual orders.    COVID-19,CEPHEID,COR/DAVID/PAD IN-HOUSE(OR EMERGENT/ADD-ON),NP SWAB IN TRANSPORT MEDIA 3-4 HR TAT, RT-PCR - Swab, Nasopharynx [[id number]]  (Normal) Collected: 03/14/21 1802    Specimen: Swab from Nasopharynx Updated: 03/14/21 1844     COVID19 Not Detected    Narrative:      Fact sheet for providers:  https://www.fda.gov/media/393943/download     Fact sheet for patients: https://www.fda.gov/media/332654/download          Radiology:    Imaging Results (Last 72 Hours)     Procedure Component Value Units Date/Time    FL Surgery Fluoro [[id number]] Collected: 03/16/21 1206     Updated: 03/16/21 1208    Narrative:      EXAM:    FL Fluoroscopy < 1 Hour     EXAM DATE:    3/16/2021 7:57 AM     CLINICAL HISTORY:    ORIF LT elbow; S42.402B-Unspecified fracture of lower end of left  humerus, initial encounter for open fracture     TECHNIQUE:    Fluoroscopic images performed in multiple projections.  Fluoroscopic  guidance was provided by a physician.     COMPARISON:    No relevant prior studies available.     FINDINGS:    BONES/JOINTS:  Proximal ulna plate-screw fixation hardware noted.       Impression:        As above.     This report was finalized on 3/16/2021 12:06 PM by Dr. Kirit Gibson MD.             Results for orders placed during the hospital encounter of 04/06/18    Adult Transthoracic Echo Complete W/ Cont if Necessary Per Protocol    Interpretation Summary  · Left ventricular systolic function is hyperdynamic (EF > 70%). Estimated EF appears to be in the range of 66 - 70%. Normal left ventricular cavity size and wall thickness noted.  · The study is technically suboptimal for diagnosis.      Assessment/Plan:   Left elbow fracture, operative note mentions an abrasion along the lateral aspect of the olecranon secondary to a skin Tear.  This was repaired by ORIF, I had discussed this case with Dr. Stoddard yesterday who did not think intraoperative cultures were needed.  Dr. Allison's note seen as well.  Patient currently is on vancomycin as well as Invanz, possible need 6 weeks of IV antibiotic therapy but will try to discuss with orthopedics in greater detail whether this was truly an open fracture.  Further care per orthopedic surgery.    ESBL UTI on Invanz, follow ID following as well    Parkinson's disease,  stable    DVT prophylaxis, SCUDs    Previous elevated anion gap has resolved    Patient was on Eliquis at the outlying nursing facility but per Dr.Behbahani's checkout this was for DVT prophylaxis only since she had had the fracture.  We have not seen any evidence of atrial fibrillation here.  Continue SCUDs currently for DVT prophylaxis.  Can add anticoagulants at orthopedic discretion.    Disposition SNF    Sang Epps MD

## 2021-03-16 NOTE — PLAN OF CARE
Goal Outcome Evaluation:   Patient resting well during shift. She has no complaints or concerns at this time. She remains NPO at midnight for surgery in the AM. No distress noted will follow plan of care.

## 2021-03-16 NOTE — PLAN OF CARE
Goal Outcome Evaluation:        Patient back from the OR after elbow surgery, she is resting with eyes closed and appears to be in no pain at this time.  Will monitor and follow plan of care.

## 2021-03-16 NOTE — OP NOTE
Debra Resendiz  [id number]   1945   Date of Procedure: March 16 of 2021    Preoperative Diagnosis: Left olecranon fracture    Postoperative diagnosis: Left olecranon fracture    Procedure: Open reduction and internal fixation of the left olecranon    Indications for Surgery: This patient had sustained a fall and fractured her left elbow.  She did have a skin tear around this area, and there was apparently some concerns for an open fracture.  She was transferred from an [facility] to [facility] for treatment of this left elbow.  Her son acted as power of , and answered all questions and signed medical consent for her.  I had a discussion with him regarding the need for operative stabilization of this fracture, and the need for support for the elbow.  They were also informed regarding the risks associated with this procedure which include but not limited to: Scar, nerve damage, infection, blood loss, pain, incomplete relief of pain, and the need for further surgery.    Surgeon(s): Williams Mccollum DO     Anesthesia: Endotracheal tube intubation    EBL: 50 cc    Tourniquet: 67 minutes at 180 mmHg    Preoperative Antibiotic: 2 g of Ancef was administered by the anesthesia staff prior to incision     Specimens: None    Complications: None     Disposition: Stable to the recovery room    Implant List: A Synthes 4 hole olecranon plate was used.  I utilized the following 2.7 mm VA locking screws: 18 mm x 1, 20 mm x 2, 22 mm x 1, 26 mm x 1, 40 mm x 1.  Also utilized the following 3.5 mm cortical screws: 18 mm x 2 and 20 mm x 1.    Operative Procedure: The patient was taken to the operating room and placed supine on the operating room table. A timeout was performed to verify the appropriate location, patient name, and intended procedure.  2 g of Ancef was administered by the anesthesia staff for preoperative prophylactic antibiotic therapy. All bony prominences were well-padded. Anesthesia used an  endotracheal tube for airway management during this procedure.    The patient was placed in the right lateral decubitus position.  A tourniquet was placed around the left upper arm, but not inflated at this point.  All bony prominences were well-padded.  An axillary roll was placed.    The splint was removed, and the skin tear evaluated.  This appeared to be superficial, and appeared to be an abrasion along the lateral aspect of the olecranon.  Secondary to this skin tear, a Betadine wet prep was used to prepare the left upper extremity sterilely.    The left upper extremity was then draped sterilely.    The planned skin incision was marked out along the posterior aspect of the left elbow, trying to leave a bridge of skin away from these abrasions.  The left upper extremity was then exsanguinated with an Esmarch and the tourniquet was inflated to 180 mmHg.    The skin incision was then made with a 10 blade knife through the skin only.  Blunt dissection was carried down to the olecranon, where the oblique comminuted fracture was notable.  I was able to do clean out the fracture site of any debris, and soft tissue that was interposed.  With compression, I was able to reduce the fracture.  I placed a 4-hole olecranon plate along the posterior aspect of the olecranon, and pinned this in place.  X-rays were taken to ensure that the joint surface was adequately reduced.    I then placed a 3.5 mm cortical screw distally in compressive fashion to maintain this in a reduced position.  I then proceeded to place locking screws proximal to maintain this reduction.  Initially the fracture slightly displaced, and for this reason screws were removed, and additional locking screws were placed with the fracture in a reduced position.    X-rays were taken to verify the appropriateness of the reduction, as well as the joint surface being concentric.  With this being acceptable and the joint surface concentric, the cortical screws were  placed distally through the plate to offer additional fixation and stabilization.    Final images were taken with the C-arm after which the subcutaneous tissue was approximated with 2-0 Monocryl in buried figure-of-eight fashion.  The skin edges were then approximated with 3-0 Monocryl and 3-0 nylon.  The skin was cleansed of any debris, and the tourniquet was deflated, and adequate hemostasis was noted.  The skin edge was covered as well as the abrasions with Adaptic, 4 x 4's, and she was placed in a posterior splint with the elbow at 90 degrees of flexion.    I did discuss the operative findings with the patient's son in the preoperative waiting area.  The patient was able to be extubated and transferred to the postoperative recovery in stable condition.      Williams Mccollum DO  03/16/21  09:44 EDT

## 2021-03-16 NOTE — NURSING NOTE
Son at bedside when transport picked up for surgery.  Son states that when she comes out of surgery she will need her dentures placed back in her mouth so that she will be able to breathe.  RN told son that she will not be able to wear dentures during surgery, son insistent that she will need them after, son took the container with him to surgery waiting in case they were needed.  Surgery RN made aware of situation.

## 2021-03-16 NOTE — ANESTHESIA PREPROCEDURE EVALUATION
Anesthesia Evaluation     Patient summary reviewed and Nursing notes reviewed   no history of anesthetic complications:  NPO Solid Status: > 8 hours  NPO Liquid Status: > 8 hours           Airway   Mallampati: II  TM distance: >3 FB  Neck ROM: full  no difficulty expected  Dental    (+) lower dentures and upper dentures    Pulmonary    (+) a smoker Former, decreased breath sounds,   Cardiovascular - negative cardio ROS    Rhythm: regular  Rate: normal        Neuro/Psych- negative ROS  GI/Hepatic/Renal/Endo - negative ROS     Musculoskeletal     (+) chronic pain,   Abdominal    Substance History - negative use     OB/GYN negative ob/gyn ROS         Other - negative ROS                         Anesthesia Plan    ASA 2     general     intravenous induction     Anesthetic plan, all risks, benefits, and alternatives have been provided, discussed and informed consent has been obtained with: patient.  Use of blood products discussed with patient .   Plan discussed with CRNA.

## 2021-03-16 NOTE — ANESTHESIA PROCEDURE NOTES
Peripheral Block    Pre-sedation assessment completed: 3/16/2021 9:20 AM    Patient location during procedure: OB  Start time: 3/16/2021 9:20 AM  Stop time: 3/16/2021 9:28 AM  Reason for block: post-op pain management  Performed by  Anesthesiologist: Gama Hines MD  Preanesthetic Checklist  Completed: patient identified, IV checked, site marked, risks and benefits discussed, surgical consent, monitors and equipment checked, pre-op evaluation and timeout performed  Prep:  Sterile barriers:cap, gloves and sterile barriers  Prep: ChloraPrep  Patient monitoring: blood pressure monitoring, continuous pulse oximetry and EKG  Procedure  Sedation:yes  Performed under: MAC  Guidance:landmark technique  Images:still images not obtained    Laterality:left  Block Type:supraclavicular  Injection Technique:single-shot  Needle Type:short-bevel  Needle Gauge:20 G    Catheter Size:20 G    Medications Used: bupivacaine PF (MARCAINE) injection 0.5%, 20 mL  Med admintered at 3/16/2021 9:27 AM      Post Assessment  Injection Assessment: negative aspiration for heme  Patient Tolerance:comfortable throughout block  Complications:no

## 2021-03-16 NOTE — PROGRESS NOTES
PROGRESS NOTE         Patient Identification:  Name:  Debra Resendiz  Age:  75 y.o.  Sex:  female  :  1945  MRN:  [id number]  Visit Number:  [id number]  Primary Care Provider:  Leigh Ann Fu MD         LOS: 3 days       ----------------------------------------------------------------------------------------------------------------------  Subjective       Chief Complaints:    No chief complaint on file.        Interval History:      The patient was out of her room for surgery this morning.  Discussed case with primary RN and she reports no issues overnight.  Afebrile, no diarrhea.  CRP is elevated at 1.27.  WBC remains normal.    Review of Systems:    Patient was out of room for procedure.    ----------------------------------------------------------------------------------------------------------------------      Objective       Current Hospital Meds:  aspirin, 325 mg, Oral, Daily  [MAR Hold] b complex-C-folic acid, 1 capsule, Oral, Daily  [MAR Hold] benztropine, 1 mg, Oral, BID  [MAR Hold] busPIRone, 15 mg, Oral, TID  [MAR Hold] citalopram, 10 mg, Oral, Nightly  [MAR Hold] docusate sodium, 100 mg, Oral, BID  ertapenem, 1 g, Intravenous, Q24H  [MAR Hold] ferrous sulfate, 325 mg, Oral, BID  [MAR Hold] lactobacillus acidophilus, 1 capsule, Oral, Daily  [MAR Hold] lactulose, 20 g, Oral, Nightly  [MAR Hold] pantoprazole, 40 mg, Oral, Q AM  [MAR Hold] polyethylene glycol, 17 g, Oral, Daily  [MAR Hold] senna, 1 tablet, Oral, BID  [MAR Hold] sodium chloride, 10 mL, Intravenous, Q12H  sodium chloride, 10 mL, Intravenous, Q12H  vancomycin, 1,000 mg, Intravenous, Q18H  [MAR Hold] vitamin D3, 5,000 Units, Oral, Daily      Pharmacy Consult - Pharmacy to dose,   Pharmacy to dose vancomycin,       ----------------------------------------------------------------------------------------------------------------------    Vital Signs:  Temp:  [97.3 °F (36.3 °C)-98.6 °F (37 °C)] 98.6 °F (37 °C)  Heart Rate:   [71-89] 84  Resp:  [16-20] 18  BP: (105-136)/(53-72) 123/62  Mean Arterial Pressure (Non-Invasive) for the past 24 hrs (Last 3 readings):   Noninvasive MAP (mmHg)   03/16/21 0506 82   03/16/21 0309 67   03/15/21 2238 85     SpO2 Percentage    03/16/21 0309 03/16/21 0506 03/16/21 0842   SpO2: 94% 96% 94%     SpO2:  [94 %-98 %] 94 %  on   ;   Device (Oxygen Therapy): room air    Body mass index is 15.88 kg/m².  Wt Readings from Last 3 Encounters:   03/16/21 44.6 kg (98 lb 6.4 oz)   04/13/18 56.3 kg (124 lb 3.2 oz)        Intake/Output Summary (Last 24 hours) at 3/16/2021 1018  Last data filed at 3/16/2021 0506  Gross per 24 hour   Intake 360 ml   Output 850 ml   Net -490 ml     Diet Soft Texture; Ground; Consistent Carbohydrate  NPO Diet  ----------------------------------------------------------------------------------------------------------------------      Physical Exam:    Patient was out of room for procedure.  ----------------------------------------------------------------------------------------------------------------------  Results from last 7 days   Lab Units 03/14/21  2154   TROPONIN T ng/mL <0.010           Results from last 7 days   Lab Units 03/16/21  0144 03/15/21  1331 03/14/21  1305 03/13/21  0322   CRP mg/dL 1.27*  --   --   --    LACTATE mmol/L  --   --  1.4  --    WBC 10*3/mm3 8.60 9.78  --  8.69   HEMOGLOBIN g/dL 11.2* 13.1  --  11.6*   HEMATOCRIT % 35.4 43.8  --  36.2   MCV fL 100.9* 104.5*  --  98.4*   MCHC g/dL 31.6 29.9*  --  32.0   PLATELETS 10*3/mm3 321 309  --  309     Results from last 7 days   Lab Units 03/16/21  0144 03/15/21  1331 03/14/21  0109 03/13/21  0322   SODIUM mmol/L 134* 131* 140 136   POTASSIUM mmol/L 4.4 5.0 3.7 4.3   MAGNESIUM mg/dL  --   --  1.7 1.9   CHLORIDE mmol/L 105 105 111* 104   CO2 mmol/L 22.7 18.1* 14.1* 23.9   BUN mg/dL 14 14 17 15   CREATININE mg/dL 0.47* 0.48* 0.41* 0.53*   EGFR IF NONAFRICN AM mL/min/1.73 130 126 >150 113   CALCIUM mg/dL 9.2 9.3 7.8* 9.2    GLUCOSE mg/dL 88 86 74 94   ALBUMIN g/dL 2.97*  --   --  3.22*   BILIRUBIN mg/dL 0.3  --   --  0.5   ALK PHOS U/L 96  --   --  100   AST (SGOT) U/L 21  --   --  18   ALT (SGPT) U/L 14  --   --  15   Estimated Creatinine Clearance: 43.4 mL/min (A) (by C-G formula based on SCr of 0.47 mg/dL (L)).  No results found for: AMMONIA    Glucose   Date/Time Value Ref Range Status   03/16/2021 0644 94 70 - 130 mg/dL Final     Lab Results   Component Value Date    HGBA1C 5.20 04/06/2018     Lab Results   Component Value Date    TSH 2.243 04/08/2018       Blood Culture   Date Value Ref Range Status   03/13/2021 No growth at 3 days  Preliminary   03/13/2021 No growth at 3 days  Preliminary     Urine Culture   Date Value Ref Range Status   03/13/2021 >100,000 CFU/mL Escherichia coli ESBL (A)  Final     Comment:     Consider infectious disease consult.  Susceptibility results may not correlate to clinical outcomes.     No results found for: WOUNDCX  No results found for: STOOLCX  No results found for: RESPCX  Pain Management Panel    There is no flowsheet data to display.           ----------------------------------------------------------------------------------------------------------------------  Imaging Results (Last 24 Hours)       Procedure Component Value Units Date/Time    FL Surgery Fluoro [[id number]] Resulted: 03/16/21 0757     Updated: 03/16/21 0757            ----------------------------------------------------------------------------------------------------------------------    Assessment/Plan       Assessment/Plan     ASSESSMENT:    1.  ESBL UTI  2.  Osteomyelitis    PLAN:    The patient was out of her room for surgery this morning.  Discussed case with primary RN and she reports no issues overnight.  Afebrile, no diarrhea.  CRP is elevated at 1.27.  WBC remains normal.    Urinalysis on 3/13/2021 was positive with 6-12 WBCs and 4+ bacteria.  Urine culture finalized as greater than 100,000 colonies of E. coli ESBL.   Left elbow x-ray on 3/13/2021 showed a defect in the dorsal cortex of the proximal ulna.  Most likely there is a complete fracture through into the olecranon fossa but is not clearly visible on these images.  Chest x-ray on 3/13/2021 showed stable chronic lung changes.  No acute cardiopulmonary findings.  Blood cultures on 3/13/2021 are so far showing no growth at 2 days.  COVID-19 testing on 3/14/2021 was negative.     Unfortunately in the setting of open fracture and evidence of local infection, it is very concerning for superimposed acute osteomyelitis and we will treat as such.     Recommend to acquire deep intraoperative cultures during surgery.  This is very important to direct 6-week IV antibiotic course. Recommend to continue on vancomycin pharmacy to dose for osteomyelitis and Invanz pharmacy to dose for UTI and osteomyelitis with hopes to de-escalate when cultures become available.  We will follow CRP closely.    Code Status:   Code Status and Medical Interventions:   Ordered at: 03/13/21 0434     Limited Support to NOT Include:    Intubation     Code Status:    No CPR     Medical Interventions (Level of Support Prior to Arrest):    Limited     CRISSY Llanos  03/16/21  10:18 EDT

## 2021-03-17 LAB
ANION GAP SERPL CALCULATED.3IONS-SCNC: 8.3 MMOL/L (ref 5–15)
BASOPHILS # BLD AUTO: 0.07 10*3/MM3 (ref 0–0.2)
BASOPHILS NFR BLD AUTO: 0.8 % (ref 0–1.5)
BUN SERPL-MCNC: 10 MG/DL (ref 8–23)
BUN/CREAT SERPL: 20.4 (ref 7–25)
CALCIUM SPEC-SCNC: 8.8 MG/DL (ref 8.6–10.5)
CHLORIDE SERPL-SCNC: 104 MMOL/L (ref 98–107)
CO2 SERPL-SCNC: 21.7 MMOL/L (ref 22–29)
CREAT SERPL-MCNC: 0.49 MG/DL (ref 0.57–1)
CRP SERPL-MCNC: 2.17 MG/DL (ref 0–0.5)
DEPRECATED RDW RBC AUTO: 50.1 FL (ref 37–54)
EOSINOPHIL # BLD AUTO: 0.15 10*3/MM3 (ref 0–0.4)
EOSINOPHIL NFR BLD AUTO: 1.7 % (ref 0.3–6.2)
ERYTHROCYTE [DISTWIDTH] IN BLOOD BY AUTOMATED COUNT: 13.6 % (ref 12.3–15.4)
GFR SERPL CREATININE-BSD FRML MDRD: 123 ML/MIN/1.73
GLUCOSE SERPL-MCNC: 96 MG/DL (ref 65–99)
HCT VFR BLD AUTO: 35.4 % (ref 34–46.6)
HGB BLD-MCNC: 11.1 G/DL (ref 12–15.9)
IMM GRANULOCYTES # BLD AUTO: 0.04 10*3/MM3 (ref 0–0.05)
IMM GRANULOCYTES NFR BLD AUTO: 0.5 % (ref 0–0.5)
LYMPHOCYTES # BLD AUTO: 1.29 10*3/MM3 (ref 0.7–3.1)
LYMPHOCYTES NFR BLD AUTO: 14.9 % (ref 19.6–45.3)
MCH RBC QN AUTO: 31.5 PG (ref 26.6–33)
MCHC RBC AUTO-ENTMCNC: 31.4 G/DL (ref 31.5–35.7)
MCV RBC AUTO: 100.6 FL (ref 79–97)
MONOCYTES # BLD AUTO: 0.8 10*3/MM3 (ref 0.1–0.9)
MONOCYTES NFR BLD AUTO: 9.2 % (ref 5–12)
NEUTROPHILS NFR BLD AUTO: 6.32 10*3/MM3 (ref 1.7–7)
NEUTROPHILS NFR BLD AUTO: 72.9 % (ref 42.7–76)
NRBC BLD AUTO-RTO: 0 /100 WBC (ref 0–0.2)
PLATELET # BLD AUTO: 273 10*3/MM3 (ref 140–450)
PMV BLD AUTO: 9.3 FL (ref 6–12)
POTASSIUM SERPL-SCNC: 4.4 MMOL/L (ref 3.5–5.2)
RBC # BLD AUTO: 3.52 10*6/MM3 (ref 3.77–5.28)
SODIUM SERPL-SCNC: 134 MMOL/L (ref 136–145)
WBC # BLD AUTO: 8.67 10*3/MM3 (ref 3.4–10.8)

## 2021-03-17 PROCEDURE — 25010000002 ERTAPENEM PER 500 MG: Performed by: ORTHOPAEDIC SURGERY

## 2021-03-17 PROCEDURE — 97530 THERAPEUTIC ACTIVITIES: CPT

## 2021-03-17 PROCEDURE — 99231 SBSQ HOSP IP/OBS SF/LOW 25: CPT | Performed by: INTERNAL MEDICINE

## 2021-03-17 PROCEDURE — 80048 BASIC METABOLIC PNL TOTAL CA: CPT | Performed by: ORTHOPAEDIC SURGERY

## 2021-03-17 PROCEDURE — 86140 C-REACTIVE PROTEIN: CPT | Performed by: PHYSICIAN ASSISTANT

## 2021-03-17 PROCEDURE — 85025 COMPLETE CBC W/AUTO DIFF WBC: CPT | Performed by: ORTHOPAEDIC SURGERY

## 2021-03-17 PROCEDURE — 97166 OT EVAL MOD COMPLEX 45 MIN: CPT

## 2021-03-17 RX ADMIN — FERROUS SULFATE TAB 325 MG (65 MG ELEMENTAL FE) 325 MG: 325 (65 FE) TAB at 19:49

## 2021-03-17 RX ADMIN — POLYETHYLENE GLYCOL (3350) 17 G: 17 POWDER, FOR SOLUTION ORAL at 08:31

## 2021-03-17 RX ADMIN — RENO CAPS 1 MG: 100; 1.5; 1.7; 20; 10; 1; 150; 5; 6 CAPSULE ORAL at 08:31

## 2021-03-17 RX ADMIN — SENNOSIDES 1 TABLET: 8.6 TABLET, FILM COATED ORAL at 19:48

## 2021-03-17 RX ADMIN — FERROUS SULFATE TAB 325 MG (65 MG ELEMENTAL FE) 325 MG: 325 (65 FE) TAB at 08:31

## 2021-03-17 RX ADMIN — ASPIRIN 325 MG: 325 TABLET, COATED ORAL at 08:31

## 2021-03-17 RX ADMIN — HYDROCODONE BITARTRATE AND ACETAMINOPHEN 1 TABLET: 7.5; 325 TABLET ORAL at 09:47

## 2021-03-17 RX ADMIN — SENNOSIDES 1 TABLET: 8.6 TABLET, FILM COATED ORAL at 08:31

## 2021-03-17 RX ADMIN — DOCUSATE SODIUM 100 MG: 100 CAPSULE, LIQUID FILLED ORAL at 08:31

## 2021-03-17 RX ADMIN — Medication 1 CAPSULE: at 08:31

## 2021-03-17 RX ADMIN — BENZTROPINE MESYLATE 1 MG: 1 TABLET ORAL at 19:49

## 2021-03-17 RX ADMIN — BENZTROPINE MESYLATE 1 MG: 1 TABLET ORAL at 08:31

## 2021-03-17 RX ADMIN — BUSPIRONE HYDROCHLORIDE 15 MG: 10 TABLET ORAL at 19:49

## 2021-03-17 RX ADMIN — ERTAPENEM SODIUM 1 G: 1 INJECTION, POWDER, LYOPHILIZED, FOR SOLUTION INTRAMUSCULAR; INTRAVENOUS at 16:45

## 2021-03-17 RX ADMIN — BUSPIRONE HYDROCHLORIDE 15 MG: 10 TABLET ORAL at 08:31

## 2021-03-17 RX ADMIN — PANTOPRAZOLE SODIUM 40 MG: 40 TABLET, DELAYED RELEASE ORAL at 04:41

## 2021-03-17 RX ADMIN — CITALOPRAM HYDROBROMIDE 10 MG: 20 TABLET ORAL at 19:49

## 2021-03-17 RX ADMIN — SODIUM CHLORIDE, PRESERVATIVE FREE 10 ML: 5 INJECTION INTRAVENOUS at 08:32

## 2021-03-17 RX ADMIN — LACTULOSE 20 G: 10 SOLUTION ORAL at 19:50

## 2021-03-17 RX ADMIN — Medication 5000 UNITS: at 08:31

## 2021-03-17 RX ADMIN — DOCUSATE SODIUM 100 MG: 100 CAPSULE, LIQUID FILLED ORAL at 19:49

## 2021-03-17 RX ADMIN — BUSPIRONE HYDROCHLORIDE 15 MG: 10 TABLET ORAL at 16:45

## 2021-03-17 NOTE — PROGRESS NOTES
Discharge Planning Assessment   [place]     Patient Name: Debra Resendiz  MRN: [id number]  Today's Date: 3/17/2021    Admit Date: 3/13/2021        Discharge Plan     Row Name 03/17/21 1144       Plan    Plan  Pt admitted on 3/13/21 from [facility].  Pt has a skilled bed reserved at [facility] per Loree.  [facility] prior to [facility]. SS spoke with pt's son Diaz who stated that discharge plans are for pt to return to [facility] at discharge.  SS will follow.            VANDANA LevineW

## 2021-03-17 NOTE — THERAPY TREATMENT NOTE
Acute Care - Physical Therapy Treatment Note  JUDI Dougherty     Patient Name: Debra Resendiz  : 1945  MRN: [id number]  Today's Date: 3/17/2021   Onset of Illness/Injury or Date of Surgery: 21 (admit date)       PT Assessment (last 12 hours)      PT Evaluation and Treatment     Row Name 21 1514          Physical Therapy Time and Intention    Subjective Information  complains of;pain  -CT     Document Type  therapy note (daily note)  -CT     Mode of Treatment  physical therapy  -CT     Patient Effort  adequate  -CT     Comment  Pt tolerated treatment fair with rest breaks provided as needed. Pt sat EOB today.   -CT     Row Name 21 1514          General Information    Patient Profile Reviewed  yes  -CT     Equipment Currently Used at Home  walker, rolling;wheelchair  -CT     Existing Precautions/Restrictions  fall  -CT     Limitations/Impairments  safety/cognitive  -CT     Row Name 21 1514          Cognition    Affect/Mental Status (Cognitive)  confused  -CT     Orientation Status (Cognition)  oriented to;person  -CT     Follows Commands (Cognition)  delayed response/completion;increased processing time needed  -CT     Row Name 21 1514          Mobility    Extremity Weight-bearing Status  left upper extremity  -CT     Left Upper Extremity (Weight-bearing Status)  non weight-bearing (NWB) elbow fracture awaiting surgery  -CT     Row Name 21 1514          Bed Mobility    Bed Mobility  rolling left;rolling right;scooting/bridging;supine-sit;sit-supine  -CT     Rolling Left [place] (Bed Mobility)  maximum assist (25% patient effort)  -CT     Rolling Right [place] (Bed Mobility)  maximum assist (25% patient effort)  -CT     Supine-Sit [place] (Bed Mobility)  maximum assist (25% patient effort)  -CT     Sit-Supine [place] (Bed Mobility)  maximum assist (25% patient effort)  -CT     Bed Mobility, Safety Issues  cognitive deficits limit understanding;decreased use of  arms for pushing/pulling;decreased use of legs for bridging/pushing  -CT     Assistive Device (Bed Mobility)  bed rails;draw sheet  -CT     Comment (Bed Mobility)  Pt sat EOB for aprox 5 minutes  -CT     Row Name 03/17/21 1514          Transfers    Comment (Transfers)  declined   -CT     Row Name             Wound 03/16/21 1143 Left posterior elbow Incision    Wound - Properties Group Placement Date: 03/16/21  -CE Placement Time: 1143  -CE Side: Left  -CE Orientation: posterior  -CE Location: elbow  -CE Primary Wound Type: Incision  -CE    Retired Wound - Properties Group Date first assessed: 03/16/21  -CE Time first assessed: 1143  -CE Side: Left  -CE Location: elbow  -CE Primary Wound Type: Incision  -CE    Row Name 03/17/21 1514          Coping    Observed Emotional State  calm;cooperative  -CT     Row Name 03/17/21 1514          Plan of Care Review    Plan of Care Reviewed With  patient  -CT     Row Name 03/17/21 1514          Therapy Assessment/Plan (PT)    Rehab Potential (PT)  fair, will monitor progress closely  -CT     Criteria for Skilled Interventions Met (PT)  yes;skilled treatment is necessary  -CT       User Key  (r) = Recorded By, (t) = Taken By, (c) = Cosigned By    Initials Name Provider Type    CT Brissa Petersen, PT Physical Therapist    CE Jaison Layton, RN Registered Nurse        Physical Therapy Education                 Title: PT OT SLP Therapies (In Progress)     Topic: Physical Therapy (In Progress)     Point: Mobility training (In Progress)     Learning Progress Summary           Patient Acceptance, E,TB, NL by CT at 3/17/2021 1519    Acceptance, E,TB, NR by MP at 3/16/2021 0801    Acceptance, E, VU by  at 3/16/2021 0142    Acceptance, E,TB, NR by CT at 3/15/2021 1513    Acceptance, E, VU by  at 3/15/2021 0902    Acceptance, E,TB, VU,NR by  at 3/15/2021 0059   Family Acceptance, E,TB, NR by MP at 3/16/2021 0801                   Point: Home exercise program (In Progress)      Learning Progress Summary           Patient Acceptance, E,TB, NL by CT at 3/17/2021 1519    Acceptance, E,TB, NR by MP at 3/16/2021 0801    Acceptance, E, VU by  at 3/16/2021 0142    Acceptance, E,TB, NR by CT at 3/15/2021 1513    Acceptance, E, VU by EB at 3/15/2021 0902    Acceptance, E,TB, VU,NR by  at 3/15/2021 0059   Family Acceptance, E,TB, NR by  at 3/16/2021 0801                   Point: Body mechanics (In Progress)     Learning Progress Summary           Patient Acceptance, E,TB, NL by CT at 3/17/2021 1519    Acceptance, E,TB, NR by  at 3/16/2021 0801    Acceptance, E, VU by  at 3/16/2021 0142    Acceptance, E,TB, NR by CT at 3/15/2021 1513    Acceptance, E, VU by  at 3/15/2021 0902    Acceptance, E,TB, VU,NR by  at 3/15/2021 0059   Family Acceptance, E,TB, NR by  at 3/16/2021 0801                   Point: Precautions (In Progress)     Learning Progress Summary           Patient Acceptance, E,TB, NL by CT at 3/17/2021 1519    Acceptance, E,TB, NR by  at 3/16/2021 0801    Acceptance, E, VU by  at 3/16/2021 0142    Acceptance, E,TB, NR by CT at 3/15/2021 1513    Acceptance, E, VU by  at 3/15/2021 0902    Acceptance, E,TB, VU,NR by  at 3/15/2021 0059   Family Acceptance, E,TB, NR by  at 3/16/2021 0801                               User Key     Initials Effective Dates Name Provider Type Discipline    EB 06/16/16 -  Elizabeth Pereyra, RN Registered Nurse Nurse    MP 06/16/16 -  Eli Mittal, RN Registered Nurse Nurse    CT 04/03/18 -  Brissa Petersen, JAXON Physical Therapist PT     09/06/18 -  Collett, Morgan, RN Registered Nurse Nurse              PT Recommendation and Plan  Anticipated Discharge Disposition (PT): skilled nursing facility  Planned Therapy Interventions (PT): balance training, bed mobility training, gait training, home exercise program, manual therapy techniques, motor coordination training, neuromuscular re-education, patient/family education, postural  re-education, strengthening, transfer training  Therapy Frequency (PT): 2 times/wk (2-5 times/wk )  Plan of Care Reviewed With: patient       Time Calculation:   PT Charges     Row Name 03/17/21 1519             Time Calculation    PT Received On  03/17/21  -CT      PT Goal Re-Cert Due Date  03/29/21  -CT         Time Calculation- PT    Total Timed Code Minutes- PT  29 minute(s)  -CT        User Key  (r) = Recorded By, (t) = Taken By, (c) = Cosigned By    Initials Name Provider Type    CT Brissa Petersen, PT Physical Therapist        Therapy Charges for Today     Code Description Service Date Service Provider Modifiers Qty    86656739177 HC PT THERAPEUTIC ACT EA 15 MIN 3/17/2021 Brissa Petersen, PT GP 2               Brissa Petersen PT  3/17/2021

## 2021-03-17 NOTE — PROGRESS NOTES
PROGRESS NOTE         Patient Identification:  Name:  Debra Resendiz  Age:  75 y.o.  Sex:  female  :  1945  MRN:  [id number]  Visit Number:  [id number]  Primary Care Provider:  Leigh Ann Fu MD         LOS: 4 days       ----------------------------------------------------------------------------------------------------------------------  Subjective       Chief Complaints:    No chief complaint on file.        Interval History:      The patient seems more confused today, but denies any new complaints at this time.  On room air in no apparent distress.  Left arm is in surgical dressing. Orthopedic surgeon, Dr. Mccollum, reported that the patient's fracture was not an open fracture as previously suspected. Afebrile, no diarrhea.  CRP is slightly worsened at 2.17, but may be related to recent surgery.  WBC remains normal.      Review of Systems:    Constitutional: no fever, chills and night sweats. No appetite change or unexpected weight change. No fatigue.  Eyes: no eye drainage, itching or redness.  HEENT: no mouth sores, dysphagia or nose bleed.    Respiratory:  No shortness of breath, cough, or production of sputum.  Cardiovascular: no chest pain, no palpitations, no orthopnea.  Gastrointestinal: no vomiting or diarrhea. No abdominal pain, hematemesis or rectal bleeding.  Nausea.  Genitourinary: no dysuria or polyuria.  Hematologic/lymphatic: no lymph node abnormalities, no easy bruising or easy bleeding.  Musculoskeletal: Left arm pain.  Skin: No rash and no itching.  Neurological: no loss of consciousness, no seizure, no headache.  Behavioral/Psych: no depression or suicidal ideation.  Endocrine: no hot flashes.  Immunologic: negative.  ----------------------------------------------------------------------------------------------------------------------      Objective       Current [facility] Meds:  aspirin, 325 mg, Oral, Daily  b complex-C-folic acid, 1 capsule, Oral, Daily  benztropine, 1  mg, Oral, BID  busPIRone, 15 mg, Oral, TID  citalopram, 10 mg, Oral, Nightly  docusate sodium, 100 mg, Oral, BID  ertapenem, 1 g, Intravenous, Q24H  ferrous sulfate, 325 mg, Oral, BID  lactobacillus acidophilus, 1 capsule, Oral, Daily  lactulose, 20 g, Oral, Nightly  pantoprazole, 40 mg, Oral, Q AM  polyethylene glycol, 17 g, Oral, Daily  senna, 1 tablet, Oral, BID  sodium chloride, 10 mL, Intravenous, Q12H  sodium chloride, 10 mL, Intravenous, Q12H  vitamin D3, 5,000 Units, Oral, Daily         ----------------------------------------------------------------------------------------------------------------------    Vital Signs:  Temp:  [97.6 °F (36.4 °C)-99 °F (37.2 °C)] 99 °F (37.2 °C)  Heart Rate:  [] 103  Resp:  [16-20] 18  BP: (120-142)/(52-90) 125/54  Mean Arterial Pressure (Non-Invasive) for the past 24 hrs (Last 3 readings):   Noninvasive MAP (mmHg)   03/17/21 0600 106   03/17/21 0300 74   03/16/21 1841 90     SpO2 Percentage    03/17/21 0300 03/17/21 0600 03/17/21 0950   SpO2: 91% 90% 92%     SpO2:  [90 %-99 %] 92 %  on  Flow (L/min):  [1-3] 1;   Device (Oxygen Therapy): room air    Body mass index is 16.37 kg/m².  Wt Readings from Last 3 Encounters:   03/17/21 46 kg (101 lb 6.4 oz)   04/13/18 56.3 kg (124 lb 3.2 oz)        Intake/Output Summary (Last 24 hours) at 3/17/2021 1347  Last data filed at 3/17/2021 1316  Gross per 24 hour   Intake 580 ml   Output --   Net 580 ml     Diet Soft Texture; Ground; Consistent Carbohydrate  ----------------------------------------------------------------------------------------------------------------------      Physical Exam:    Constitutional: Elderly  female is sitting up in bed in no apparent distress.  Mild tremor.  HENT:  Head: Normocephalic and atraumatic.  Mouth:  Moist mucous membranes.    Eyes:   No erythema or edema noted.  Neck:  Neck supple.  No JVD present.    Cardiovascular:  Normal rate, regular rhythm and normal heart sounds with no murmur.  No edema.  Pulmonary/Chest:  Lungs are clear to auscultation.  No rhonchi, rales, or wheezes auscultated.  Abdominal:  Soft.  Bowel sounds are normal.  No distension and no tenderness.    Musculoskeletal:  No edema, no tenderness, and no deformity.  No swelling or redness of joints.  Left arm is in a surgical dressing.  Neurological:  Awake, alert, and oriented to person, place, and time. Mild tremor.  Skin:  Skin is warm and dry. No pallor.  No rash or lesion noted.    Psychiatric:  Calm and cooperative.  ----------------------------------------------------------------------------------------------------------------------  Results from last 7 days   Lab Units 03/14/21  2154   TROPONIN T ng/mL <0.010           Results from last 7 days   Lab Units 03/17/21 0056 03/16/21  0144 03/15/21  1331 03/14/21  1305   CRP mg/dL 2.17* 1.27*  --   --    LACTATE mmol/L  --   --   --  1.4   WBC 10*3/mm3 8.67 8.60 9.78  --    HEMOGLOBIN g/dL 11.1* 11.2* 13.1  --    HEMATOCRIT % 35.4 35.4 43.8  --    MCV fL 100.6* 100.9* 104.5*  --    MCHC g/dL 31.4* 31.6 29.9*  --    PLATELETS 10*3/mm3 273 321 309  --      Results from last 7 days   Lab Units 03/17/21 0056 03/16/21  0144 03/15/21  1331 03/14/21  0109 03/13/21  0322   SODIUM mmol/L 134* 134* 131* 140 136   POTASSIUM mmol/L 4.4 4.4 5.0 3.7 4.3   MAGNESIUM mg/dL  --   --   --  1.7 1.9   CHLORIDE mmol/L 104 105 105 111* 104   CO2 mmol/L 21.7* 22.7 18.1* 14.1* 23.9   BUN mg/dL 10 14 14 17 15   CREATININE mg/dL 0.49* 0.47* 0.48* 0.41* 0.53*   EGFR IF NONAFRICN AM mL/min/1.73 123 130 126 >150 113   CALCIUM mg/dL 8.8 9.2 9.3 7.8* 9.2   GLUCOSE mg/dL 96 88 86 74 94   ALBUMIN g/dL  --  2.97*  --   --  3.22*   BILIRUBIN mg/dL  --  0.3  --   --  0.5   ALK PHOS U/L  --  96  --   --  100   AST (SGOT) U/L  --  21  --   --  18   ALT (SGPT) U/L  --  14  --   --  15   Estimated Creatinine Clearance: 44.8 mL/min (A) (by C-G formula based on SCr of 0.49 mg/dL (L)).  No results found for:  AMMONIA    Glucose   Date/Time Value Ref Range Status   03/16/2021 0644 94 70 - 130 mg/dL Final     Lab Results   Component Value Date    HGBA1C 5.20 04/06/2018     Lab Results   Component Value Date    TSH 2.243 04/08/2018       Blood Culture   Date Value Ref Range Status   03/13/2021 No growth at 3 days  Preliminary   03/13/2021 No growth at 3 days  Preliminary     Urine Culture   Date Value Ref Range Status   03/13/2021 >100,000 CFU/mL Escherichia coli ESBL (A)  Final     Comment:     Consider infectious disease consult.  Susceptibility results may not correlate to clinical outcomes.     No results found for: WOUNDCX  No results found for: STOOLCX  No results found for: RESPCX  Pain Management Panel    There is no flowsheet data to display.           ----------------------------------------------------------------------------------------------------------------------  Imaging Results (Last 24 Hours)       ** No results found for the last 24 hours. **            ----------------------------------------------------------------------------------------------------------------------    Assessment/Plan       Assessment/Plan     ASSESSMENT:    1.  ESBL UTI    PLAN:    The patient seems more confused today, but denies any new complaints at this time.  On room air in no apparent distress.  Left arm is in surgical dressing. Orthopedic surgeon, Dr. Mccollum, reported that the patient's fracture was not an open fracture as previously suspected. Afebrile, no diarrhea.  CRP is slightly worsened at 2.17, but may be related to recent surgery.  WBC remains normal.    Urinalysis on 3/13/2021 was positive with 6-12 WBCs and 4+ bacteria.  Urine culture finalized as greater than 100,000 colonies of E. coli ESBL.  Left elbow x-ray on 3/13/2021 showed a defect in the dorsal cortex of the proximal ulna.  Most likely there is a complete fracture through into the olecranon fossa but is not clearly visible on these images.  Chest x-ray on  3/13/2021 showed stable chronic lung changes.  No acute cardiopulmonary findings.  Blood cultures on 3/13/2021 are so far showing no growth at 2 days.  COVID-19 testing on 3/14/2021 was negative.     Per the surgeon's note, we are no longer concern for osteomyelitis.  Recommend to continue on Invanz pharmacy to dose for UTI.  We will follow closely and adjust antibiotic therapy as appropriate.    Code Status:   Code Status and Medical Interventions:   Ordered at: 03/13/21 0434     Limited Support to NOT Include:    Intubation     Code Status:    No CPR     Medical Interventions (Level of Support Prior to Arrest):    Limited     CRISSY Llanos  03/17/21  13:47 EDT

## 2021-03-17 NOTE — PLAN OF CARE
Pt continues to complain of pain w/ her LUE. LUE elevated w/ pillow support as ordered. PRN meds administered. No other s/s of distress noted.

## 2021-03-17 NOTE — PROGRESS NOTES
Inpatient Progress Note  Debra Resendiz  Date: 03/17/21  MRN: [id number]      Subjective:   Patient is postop day #1 status post left olecranon open reduction internal fixation.  Patient injury was not an open fracture.  The patient seems to be more confused on exam today.  Infectious disease is following for ESBL UTI.  Patient notes her pain is controlled on exam today.  Temperature 99.0 °F noted.  Hemoglobin stable at 11.1.        Objective:    Vitals:    03/17/21 0300 03/17/21 0500 03/17/21 0600 03/17/21 0950   BP: 120/52  142/83 125/54   BP Location: Right arm  Right arm Right arm   Patient Position: Lying  Lying Lying   Pulse: 94  99 103   Resp: 16  18 18   Temp: 97.6 °F (36.4 °C)  99 °F (37.2 °C)    TempSrc: Oral  Oral    SpO2: 91%  90% 92%   Weight:  46 kg (101 lb 6.4 oz)     Height:              Physical Exam:  Constitutional: Chronically ill female.  No respiratory distress.        Musculoskeletal: Dressings are clean dry and intact to the left upper extremity.  Patient neurovascularly is intact left upper extremity.  Flexion-extension intact distal digits left hand.  No cyanosis left upper extremity noted.  Splint is in place to left upper extremity.      Labs:    Results from last 7 days   Lab Units 03/17/21  0056 03/16/21  0144 03/15/21  1331 03/14/21  1305   CRP mg/dL 2.17* 1.27*  --   --    LACTATE mmol/L  --   --   --  1.4   WBC 10*3/mm3 8.67 8.60 9.78  --    HEMOGLOBIN g/dL 11.1* 11.2* 13.1  --    HEMATOCRIT % 35.4 35.4 43.8  --    MCV fL 100.6* 100.9* 104.5*  --    MCHC g/dL 31.4* 31.6 29.9*  --    PLATELETS 10*3/mm3 273 321 309  --          Results from last 7 days   Lab Units 03/17/21 0056 03/16/21 0144 03/15/21  1331 03/14/21  0109 03/13/21  0322   SODIUM mmol/L 134* 134* 131* 140 136   POTASSIUM mmol/L 4.4 4.4 5.0 3.7 4.3   MAGNESIUM mg/dL  --   --   --  1.7 1.9   CHLORIDE mmol/L 104 105 105 111* 104   CO2 mmol/L 21.7* 22.7 18.1* 14.1* 23.9   BUN mg/dL 10 14 14 17 15   CREATININE mg/dL 0.49*  0.47* 0.48* 0.41* 0.53*   EGFR IF NONAFRICN AM mL/min/1.73 123 130 126 >150 113   CALCIUM mg/dL 8.8 9.2 9.3 7.8* 9.2   GLUCOSE mg/dL 96 88 86 74 94   ALBUMIN g/dL  --  2.97*  --   --  3.22*   BILIRUBIN mg/dL  --  0.3  --   --  0.5   ALK PHOS U/L  --  96  --   --  100   AST (SGOT) U/L  --  21  --   --  18   ALT (SGPT) U/L  --  14  --   --  15   Estimated Creatinine Clearance: 44.8 mL/min (A) (by C-G formula based on SCr of 0.49 mg/dL (L)).  No results found for: AMMONIA  Results from last 7 days   Lab Units 03/14/21  2154   TROPONIN T ng/mL <0.010         No results found for: HGBA1C  Lab Results   Component Value Date    TSH 2.243 04/08/2018         Pain Management Panel    There is no flowsheet data to display.         Blood Culture   Date Value Ref Range Status   03/13/2021 No growth at 4 days  Preliminary   03/13/2021 No growth at 4 days  Preliminary     Urine Culture   Date Value Ref Range Status   03/13/2021 >100,000 CFU/mL Escherichia coli ESBL (A)  Final     Comment:     Consider infectious disease consult.  Susceptibility results may not correlate to clinical outcomes.     No results found for: WOUNDCX  No results found for: STOOLCX              Radiology:  Imaging Results (Last 72 Hours)     Procedure Component Value Units Date/Time    FL Surgery Fluoro [[id number]] Collected: 03/16/21 1206     Updated: 03/16/21 1208    Narrative:      EXAM:    FL Fluoroscopy < 1 Hour     EXAM DATE:    3/16/2021 7:57 AM     CLINICAL HISTORY:    ORIF LT elbow; S42.402B-Unspecified fracture of lower end of left  humerus, initial encounter for open fracture     TECHNIQUE:    Fluoroscopic images performed in multiple projections.  Fluoroscopic  guidance was provided by a physician.     COMPARISON:    No relevant prior studies available.     FINDINGS:    BONES/JOINTS:  Proximal ulna plate-screw fixation hardware noted.       Impression:        As above.     This report was finalized on 3/16/2021 12:06 PM by Dr. Kirit Gibson,  MD.               Assessment:   #1 status post left olecranon open reduction internal fixation          Plan:   Leave dressings in place to left upper extremity.  Nonweightbearing left upper extremity.  Orthopedic surgery will continue to follow.  Patient's pain is controlled.  Hemoglobin stable.    The splint is to be left intact for 2 weeks until she follows up in the office when stitches will be removed from the surgical site.        Diaz Elena, SAMANTHA March 17, 2021 14:00 EDT

## 2021-03-17 NOTE — PROGRESS NOTES
"Assisted By: Jose ENGLISH    CC: Follow-up on ESBL UTI and elbow fracture    Interview History/HPI: Patient was awake, she states she is still having some pain in her left elbow, she states she is breathing \"okay\".  Nursing reports she has been having wet chucks however they are going to hook up an external catheter device.  She has been voiding however she denies any dysuria no abdominal pain.  She is now on room air.      Vitals:    03/17/21 1505   BP: 114/48   Pulse:    Resp:    Temp:    SpO2:          Intake/Output Summary (Last 24 hours) at 3/17/2021 1634  Last data filed at 3/17/2021 1316  Gross per 24 hour   Intake 460 ml   Output --   Net 460 ml       EXAM:   98, 93, 18, 94% saturated on room air.  She is awake alert, minimal tremor with her Parkinson's, no JVD pupils are equal she moves all her fingers well sensation intact in the left hand, the splint is intact, dry, extremities are without edema lungs have bilateral breath sounds are clear, heart is a regular rate and rhythm without murmur rub gallop.  Noted SCUDs are on      Tele: Sinus    LABS:   Lab Results (last 48 hours)     Procedure Component Value Units Date/Time    C-reactive Protein [[id number]]  (Abnormal) Collected: 03/17/21 0056    Specimen: Blood Updated: 03/17/21 0859     C-Reactive Protein 2.17 mg/dL     Blood Culture - Blood, Arm, Right [[id number]] Collected: 03/13/21 0502    Specimen: Blood from Arm, Right Updated: 03/17/21 0630     Blood Culture No growth at 4 days    Blood Culture - Blood, Hand, Left [[id number]] Collected: 03/13/21 0502    Specimen: Blood from Hand, Left Updated: 03/17/21 0630     Blood Culture No growth at 4 days    Basic Metabolic Panel [[id number]]  (Abnormal) Collected: 03/17/21 0056    Specimen: Blood Updated: 03/17/21 0139     Glucose 96 mg/dL      BUN 10 mg/dL      Creatinine 0.49 mg/dL      Sodium 134 mmol/L      Potassium 4.4 mmol/L      Comment: Slight hemolysis detected by analyzer. Results may be affected.        " Chloride 104 mmol/L      CO2 21.7 mmol/L      Calcium 8.8 mg/dL      eGFR Non African Amer 123 mL/min/1.73      BUN/Creatinine Ratio 20.4     Anion Gap 8.3 mmol/L     Narrative:      GFR Normal >60  Chronic Kidney Disease <60  Kidney Failure <15      CBC & Differential [[id number]]  (Abnormal) Collected: 03/17/21 0056    Specimen: Blood Updated: 03/17/21 0116    Narrative:      The following orders were created for panel order CBC & Differential.  Procedure                               Abnormality         Status                     ---------                               -----------         ------                     CBC Auto Differential[[id number]]        Abnormal            Final result                 Please view results for these tests on the individual orders.    CBC Auto Differential [[id number]]  (Abnormal) Collected: 03/17/21 0056    Specimen: Blood Updated: 03/17/21 0116     WBC 8.67 10*3/mm3      RBC 3.52 10*6/mm3      Hemoglobin 11.1 g/dL      Hematocrit 35.4 %      .6 fL      MCH 31.5 pg      MCHC 31.4 g/dL      RDW 13.6 %      RDW-SD 50.1 fl      MPV 9.3 fL      Platelets 273 10*3/mm3      Neutrophil % 72.9 %      Lymphocyte % 14.9 %      Monocyte % 9.2 %      Eosinophil % 1.7 %      Basophil % 0.8 %      Immature Grans % 0.5 %      Neutrophils, Absolute 6.32 10*3/mm3      Lymphocytes, Absolute 1.29 10*3/mm3      Monocytes, Absolute 0.80 10*3/mm3      Eosinophils, Absolute 0.15 10*3/mm3      Basophils, Absolute 0.07 10*3/mm3      Immature Grans, Absolute 0.04 10*3/mm3      nRBC 0.0 /100 WBC     POC Glucose Once [[id number]]  (Normal) Collected: 03/16/21 0644    Specimen: Blood Updated: 03/16/21 0657     Glucose 94 mg/dL     Comprehensive Metabolic Panel [[id number]]  (Abnormal) Collected: 03/16/21 0144    Specimen: Blood Updated: 03/16/21 0238     Glucose 88 mg/dL      BUN 14 mg/dL      Creatinine 0.47 mg/dL      Sodium 134 mmol/L      Potassium 4.4 mmol/L      Comment: Slight hemolysis detected by  analyzer. Results may be affected.        Chloride 105 mmol/L      CO2 22.7 mmol/L      Calcium 9.2 mg/dL      Total Protein 6.1 g/dL      Albumin 2.97 g/dL      ALT (SGPT) 14 U/L      AST (SGOT) 21 U/L      Alkaline Phosphatase 96 U/L      Total Bilirubin 0.3 mg/dL      eGFR Non African Amer 130 mL/min/1.73      Globulin 3.1 gm/dL      A/G Ratio 0.9 g/dL      BUN/Creatinine Ratio 29.8     Anion Gap 6.3 mmol/L     Narrative:      GFR Normal >60  Chronic Kidney Disease <60  Kidney Failure <15      C-reactive Protein [[id number]]  (Abnormal) Collected: 03/16/21 0144    Specimen: Blood Updated: 03/16/21 0238     C-Reactive Protein 1.27 mg/dL     CBC & Differential [[id number]]  (Abnormal) Collected: 03/16/21 0144    Specimen: Blood Updated: 03/16/21 0216    Narrative:      The following orders were created for panel order CBC & Differential.  Procedure                               Abnormality         Status                     ---------                               -----------         ------                     CBC Auto Differential[[id number]]        Abnormal            Final result                 Please view results for these tests on the individual orders.    CBC Auto Differential [[id number]]  (Abnormal) Collected: 03/16/21 0144    Specimen: Blood Updated: 03/16/21 0216     WBC 8.60 10*3/mm3      RBC 3.51 10*6/mm3      Hemoglobin 11.2 g/dL      Hematocrit 35.4 %      .9 fL      MCH 31.9 pg      MCHC 31.6 g/dL      RDW 13.6 %      RDW-SD 50.2 fl      MPV 9.5 fL      Platelets 321 10*3/mm3      Neutrophil % 64.8 %      Lymphocyte % 22.3 %      Monocyte % 8.7 %      Eosinophil % 2.8 %      Basophil % 0.9 %      Immature Grans % 0.5 %      Neutrophils, Absolute 5.57 10*3/mm3      Lymphocytes, Absolute 1.92 10*3/mm3      Monocytes, Absolute 0.75 10*3/mm3      Eosinophils, Absolute 0.24 10*3/mm3      Basophils, Absolute 0.08 10*3/mm3      Immature Grans, Absolute 0.04 10*3/mm3      nRBC 0.0 /100 WBC            Radiology:    Imaging Results (Last 72 Hours)     Procedure Component Value Units Date/Time    FL Surgery Fluoro [[id number]] Collected: 03/16/21 1206     Updated: 03/16/21 1208    Narrative:      EXAM:    FL Fluoroscopy < 1 Hour     EXAM DATE:    3/16/2021 7:57 AM     CLINICAL HISTORY:    ORIF LT elbow; S42.402B-Unspecified fracture of lower end of left  humerus, initial encounter for open fracture     TECHNIQUE:    Fluoroscopic images performed in multiple projections.  Fluoroscopic  guidance was provided by a physician.     COMPARISON:    No relevant prior studies available.     FINDINGS:    BONES/JOINTS:  Proximal ulna plate-screw fixation hardware noted.       Impression:        As above.     This report was finalized on 3/16/2021 12:06 PM by Dr. Kirit Gibson MD.             Results for orders placed during the hospital encounter of 04/06/18    Adult Transthoracic Echo Complete W/ Cont if Necessary Per Protocol    Interpretation Summary  · Left ventricular systolic function is hyperdynamic (EF > 70%). Estimated EF appears to be in the range of 66 - 70%. Normal left ventricular cavity size and wall thickness noted.  · The study is technically suboptimal for diagnosis.      Assessment/Plan:   ESBL UTI, continue Invanz as recommended by infectious disease, CRP went up slightly but this is most likely normal postop finding, white count is normal, follow    Status post left elbow fracture repair, hemoglobin stable, follow.  This was not an open fracture    DVT prophylaxis, SCUDs    Noted patient was on Eliquis as an outpatient but this apparently was only for DVT prophylaxis around the time of her fracture therefore this is not been resumed to try to prevent any excessive bleeding.    Underlying Parkinson's disease, stable    Disposition SNF    Sang Epps MD

## 2021-03-17 NOTE — THERAPY TREATMENT NOTE
Patient Name: Debra Resendiz  : 1945    MRN: [id number]                              Today's Date: 3/17/2021       Admit Date: 3/13/2021    Visit Dx:     ICD-10-CM ICD-9-CM   1. Elbow fracture, left, open, initial encounter  S42.402B 812.50     Patient Active Problem List   Diagnosis   • Closed displaced comminuted fracture of shaft of left humerus   • Elbow fracture, left, open, initial encounter   • Former smoker   • Nursing home resident     Past Medical History:   Diagnosis Date   • Arthritis    • History of transfusion    • Parkinson disease (CMS/[facility])      Past Surgical History:   Procedure Laterality Date   • ELBOW OPEN REDUCTION INTERNAL FIXATION Left 3/16/2021    Procedure: LEFT ELBOW OPEN REDUCTION INTERNAL FIXATION WITH PLATE AND SCREWS;  Surgeon: Williams Mccollum DO;  Location: [facility];  Service: Orthopedics;  Laterality: Left;   • HIP SURGERY     • HYSTERECTOMY     • ORIF HUMERUS FRACTURE Left 2018    Procedure: LEFT HUMERUS PROXIMAL OPEN REDUCTION INTERNAL FIXATION;  Surgeon: Dani Aburto MD;  Location: [facility];  Service: Orthopedics   • TUBAL ABDOMINAL LIGATION       General Information     Row Name 21 1530          OT Time and Intention    Document Type  evaluation  -LM     Mode of Treatment  occupational therapy  -LM     Row Name 21 153          General Information    Patient Profile Reviewed  yes  -LM     Prior Level of Function  max assist:;all household mobility;transfer;ADL's  -LM     Existing Precautions/Restrictions  fall;weight bearing;other (see comments) NWB LUE  -LM     Barriers to Rehab  medically complex;previous functional deficit;cognitive status  -LM     Row Name 21 153          Living Environment    Lives With  facility resident  -LM     Row Name 21 153          Cognition    Orientation Status (Cognition)  disoriented to;place;situation;time  -LM     Row Name 21 153          Safety Issues, Functional Mobility    Safety Issues  Affecting Function (Mobility)  awareness of need for assistance;ability to follow commands;problem-solving;safety precaution awareness  -     Impairments Affecting Function (Mobility)  endurance/activity tolerance;coordination;range of motion (ROM);strength  -LM     Cognitive Impairments, Mobility Safety/Performance  attention;awareness, need for assistance;impulsivity;insight into deficits/self-awareness;judgment;problem-solving/reasoning;safety precaution awareness;safety precaution follow-through;sequencing abilities  -       User Key  (r) = Recorded By, (t) = Taken By, (c) = Cosigned By    Initials Name Provider Type     Carmen Terry, OT Occupational Therapist          Mobility/ADL's     Row Name 03/17/21 1533          Activities of Daily Living    BADL Assessment/Intervention  bathing;upper body dressing;lower body dressing;grooming;feeding;toileting  -[facility] Name 03/17/21 1533          Mobility    Extremity Weight-bearing Status  left upper extremity  -     Left Upper Extremity (Weight-bearing Status)  non weight-bearing (NWB)  -     Row Name 03/17/21 1533          Bathing Assessment/Intervention    [place] Level (Bathing)  dependent (less than 25% patient effort)  -     Row Name 03/17/21 1533          Upper Body Dressing Assessment/Training    [place] Level (Upper Body Dressing)  dependent (less than 25% patient effort)  -     Row Name 03/17/21 1533          Lower Body Dressing Assessment/Training    [place] Level (Lower Body Dressing)  dependent (less than 25% patient effort)  -     Row Name 03/17/21 1533          Grooming Assessment/Training    [place] Level (Grooming)  maximum assist (25% patient effort)  -     Row Name 03/17/21 1533          Self-Feeding Assessment/Training    [place] Level (Feeding)  set up  -     Row Name 03/17/21 1533          Toileting Assessment/Training    [place] Level (Toileting)  dependent (less than 25% patient effort)  -        User Key  (r) = Recorded By, (t) = Taken By, (c) = Cosigned By    Initials Name Provider Type    Carmen Fuentes OT Occupational Therapist        Obj/Interventions     Row Name 03/17/21 1535          Sensory Assessment (Somatosensory)    Sensory Assessment (Somatosensory)  unable/difficult to assess;other (see comments) cognition and cast  -LM     Row Name 03/17/21 1535          Range of Motion Comprehensive    General Range of Motion  other (see comments)  -LM     Comment, General Range of Motion  decreased lue arom. casted  -LM     Row Name 03/17/21 1535          Strength Comprehensive (MMT)    General Manual Muscle Testing (MMT) Assessment  other (see comments)  -LM     Comment, General Manual Muscle Testing (MMT) Assessment  deferred LUE  -LM       User Key  (r) = Recorded By, (t) = Taken By, (c) = Cosigned By    Initials Name Provider Type    Cramen Fuentes OT Occupational Therapist        Goals/Plan     Row Name 03/17/21 1537          Transfer Goal 1 (OT)    Activity/Assistive Device (Transfer Goal 1, OT)  commode, 3-in-1  -LM     [place] Level/Cues Needed (Transfer Goal 1, OT)  minimum assist (75% or more patient effort);moderate assist (50-74% patient effort)  -LM     Time Frame (Transfer Goal 1, OT)  by discharge  -LM     Row Name 03/17/21 1537          Grooming Goal 1 (OT)    [place] (Grooming Goal 1, OT)  minimum assist (75% or more patient effort)  -LM     Time Frame (Grooming Goal 1, OT)  by discharge  -LM     Row Name 03/17/21 1537          Therapy Assessment/Plan (OT)    Planned Therapy Interventions (OT)  activity tolerance training;BADL retraining;patient/caregiver education/training;ROM/therapeutic exercise;strengthening exercise;transfer/mobility retraining;adaptive equipment training  -LM       User Key  (r) = Recorded By, (t) = Taken By, (c) = Cosigned By    Initials Name Provider Type    Carmen Fuentes, OT Occupational Therapist        Clinical Impression     Row Name  03/17/21 1536          Plan of Care Review    Plan of Care Reviewed With  patient  -LM     Row Name 03/17/21 1536          Therapy Assessment/Plan (OT)    Rehab Potential (OT)  fair, will monitor progress closely  -LM     Criteria for Skilled Therapeutic Interventions Met (OT)  yes;meets criteria;skilled treatment is necessary  -LM     Row Name 03/17/21 1536          Therapy Plan Review/Discharge Plan (OT)    Anticipated Discharge Disposition (OT)  skilled nursing facility  -     Row Name 03/17/21 1536          Positioning and Restraints    Post Treatment Position  bed  -LM     In Bed  call light within reach;encouraged to call for assist  -LM       User Key  (r) = Recorded By, (t) = Taken By, (c) = Cosigned By    Initials Name Provider Type     Carmen Terry, OT Occupational Therapist        Outcome Measures    No documentation.         OT Recommendation and Plan  Planned Therapy Interventions (OT): activity tolerance training, BADL retraining, patient/caregiver education/training, ROM/therapeutic exercise, strengthening exercise, transfer/mobility retraining, adaptive equipment training  Plan of Care Review  Plan of Care Reviewed With: patient     Time Calculation:     Therapy Charges for Today     Code Description Service Date Service Provider Modifiers Qty    [id number]  OT EVAL MOD COMPLEXITY 4 3/17/2021 Carmen Terry OT GO 1               Carmen Terry OT  3/17/2021

## 2021-03-17 NOTE — PLAN OF CARE
Goal Outcome Evaluation:     Progress: improving   Patient rested in between care. Patient reports pain all over body with more on her left elbow.PRN pain medication provided. Patient remains confused to place and time but reorients easily. PT visited patient and she sat up on the side of the bed without difficulty. Will continue to monitor and follow plan of care.

## 2021-03-18 LAB
ALBUMIN SERPL-MCNC: 2.9 G/DL (ref 3.5–5.2)
ALBUMIN/GLOB SERPL: 0.9 G/DL
ALP SERPL-CCNC: 104 U/L (ref 39–117)
ALT SERPL W P-5'-P-CCNC: 19 U/L (ref 1–33)
ANION GAP SERPL CALCULATED.3IONS-SCNC: 8.1 MMOL/L (ref 5–15)
AST SERPL-CCNC: 25 U/L (ref 1–32)
BACTERIA SPEC AEROBE CULT: NORMAL
BACTERIA SPEC AEROBE CULT: NORMAL
BASOPHILS # BLD AUTO: 0.05 10*3/MM3 (ref 0–0.2)
BASOPHILS NFR BLD AUTO: 0.6 % (ref 0–1.5)
BILIRUB SERPL-MCNC: 0.3 MG/DL (ref 0–1.2)
BUN SERPL-MCNC: 10 MG/DL (ref 8–23)
BUN/CREAT SERPL: 23.3 (ref 7–25)
CALCIUM SPEC-SCNC: 9.3 MG/DL (ref 8.6–10.5)
CHLORIDE SERPL-SCNC: 103 MMOL/L (ref 98–107)
CO2 SERPL-SCNC: 23.9 MMOL/L (ref 22–29)
CREAT SERPL-MCNC: 0.43 MG/DL (ref 0.57–1)
CRP SERPL-MCNC: 3.83 MG/DL (ref 0–0.5)
DEPRECATED RDW RBC AUTO: 49.4 FL (ref 37–54)
EOSINOPHIL # BLD AUTO: 0.22 10*3/MM3 (ref 0–0.4)
EOSINOPHIL NFR BLD AUTO: 2.7 % (ref 0.3–6.2)
ERYTHROCYTE [DISTWIDTH] IN BLOOD BY AUTOMATED COUNT: 13.6 % (ref 12.3–15.4)
GFR SERPL CREATININE-BSD FRML MDRD: 144 ML/MIN/1.73
GLOBULIN UR ELPH-MCNC: 3.1 GM/DL
GLUCOSE SERPL-MCNC: 98 MG/DL (ref 65–99)
HCT VFR BLD AUTO: 35.1 % (ref 34–46.6)
HGB BLD-MCNC: 11.2 G/DL (ref 12–15.9)
IMM GRANULOCYTES # BLD AUTO: 0.06 10*3/MM3 (ref 0–0.05)
IMM GRANULOCYTES NFR BLD AUTO: 0.7 % (ref 0–0.5)
LYMPHOCYTES # BLD AUTO: 1.75 10*3/MM3 (ref 0.7–3.1)
LYMPHOCYTES NFR BLD AUTO: 21.1 % (ref 19.6–45.3)
MCH RBC QN AUTO: 31.7 PG (ref 26.6–33)
MCHC RBC AUTO-ENTMCNC: 31.9 G/DL (ref 31.5–35.7)
MCV RBC AUTO: 99.4 FL (ref 79–97)
MONOCYTES # BLD AUTO: 0.85 10*3/MM3 (ref 0.1–0.9)
MONOCYTES NFR BLD AUTO: 10.2 % (ref 5–12)
NEUTROPHILS NFR BLD AUTO: 5.37 10*3/MM3 (ref 1.7–7)
NEUTROPHILS NFR BLD AUTO: 64.7 % (ref 42.7–76)
NRBC BLD AUTO-RTO: 0 /100 WBC (ref 0–0.2)
PLATELET # BLD AUTO: 347 10*3/MM3 (ref 140–450)
PMV BLD AUTO: 9.2 FL (ref 6–12)
POTASSIUM SERPL-SCNC: 4.2 MMOL/L (ref 3.5–5.2)
PROT SERPL-MCNC: 6 G/DL (ref 6–8.5)
RBC # BLD AUTO: 3.53 10*6/MM3 (ref 3.77–5.28)
SODIUM SERPL-SCNC: 135 MMOL/L (ref 136–145)
WBC # BLD AUTO: 8.3 10*3/MM3 (ref 3.4–10.8)

## 2021-03-18 PROCEDURE — 25010000002 ERTAPENEM PER 500 MG: Performed by: INTERNAL MEDICINE

## 2021-03-18 PROCEDURE — 80053 COMPREHEN METABOLIC PANEL: CPT | Performed by: INTERNAL MEDICINE

## 2021-03-18 PROCEDURE — 85025 COMPLETE CBC W/AUTO DIFF WBC: CPT | Performed by: INTERNAL MEDICINE

## 2021-03-18 PROCEDURE — 99232 SBSQ HOSP IP/OBS MODERATE 35: CPT | Performed by: INTERNAL MEDICINE

## 2021-03-18 PROCEDURE — 86140 C-REACTIVE PROTEIN: CPT | Performed by: PHYSICIAN ASSISTANT

## 2021-03-18 RX ADMIN — SODIUM CHLORIDE, PRESERVATIVE FREE 10 ML: 5 INJECTION INTRAVENOUS at 08:43

## 2021-03-18 RX ADMIN — ERTAPENEM SODIUM 1 G: 1 INJECTION, POWDER, LYOPHILIZED, FOR SOLUTION INTRAMUSCULAR; INTRAVENOUS at 15:22

## 2021-03-18 RX ADMIN — DOCUSATE SODIUM 100 MG: 100 CAPSULE, LIQUID FILLED ORAL at 08:43

## 2021-03-18 RX ADMIN — POLYETHYLENE GLYCOL (3350) 17 G: 17 POWDER, FOR SOLUTION ORAL at 08:44

## 2021-03-18 RX ADMIN — DOCUSATE SODIUM 100 MG: 100 CAPSULE, LIQUID FILLED ORAL at 20:53

## 2021-03-18 RX ADMIN — BENZTROPINE MESYLATE 1 MG: 1 TABLET ORAL at 20:52

## 2021-03-18 RX ADMIN — Medication 1 CAPSULE: at 08:43

## 2021-03-18 RX ADMIN — SODIUM CHLORIDE, PRESERVATIVE FREE 10 ML: 5 INJECTION INTRAVENOUS at 20:53

## 2021-03-18 RX ADMIN — SENNOSIDES 1 TABLET: 8.6 TABLET, FILM COATED ORAL at 20:52

## 2021-03-18 RX ADMIN — BUSPIRONE HYDROCHLORIDE 15 MG: 10 TABLET ORAL at 15:22

## 2021-03-18 RX ADMIN — BUSPIRONE HYDROCHLORIDE 15 MG: 10 TABLET ORAL at 20:52

## 2021-03-18 RX ADMIN — SENNOSIDES 1 TABLET: 8.6 TABLET, FILM COATED ORAL at 08:43

## 2021-03-18 RX ADMIN — RENO CAPS 1 MG: 100; 1.5; 1.7; 20; 10; 1; 150; 5; 6 CAPSULE ORAL at 08:43

## 2021-03-18 RX ADMIN — BENZTROPINE MESYLATE 1 MG: 1 TABLET ORAL at 08:43

## 2021-03-18 RX ADMIN — Medication 5000 UNITS: at 08:43

## 2021-03-18 RX ADMIN — CITALOPRAM HYDROBROMIDE 10 MG: 20 TABLET ORAL at 20:52

## 2021-03-18 RX ADMIN — FERROUS SULFATE TAB 325 MG (65 MG ELEMENTAL FE) 325 MG: 325 (65 FE) TAB at 08:43

## 2021-03-18 RX ADMIN — FERROUS SULFATE TAB 325 MG (65 MG ELEMENTAL FE) 325 MG: 325 (65 FE) TAB at 20:52

## 2021-03-18 RX ADMIN — ASPIRIN 325 MG: 325 TABLET, COATED ORAL at 08:43

## 2021-03-18 RX ADMIN — BUSPIRONE HYDROCHLORIDE 15 MG: 10 TABLET ORAL at 08:43

## 2021-03-18 RX ADMIN — PANTOPRAZOLE SODIUM 40 MG: 40 TABLET, DELAYED RELEASE ORAL at 05:08

## 2021-03-18 RX ADMIN — LACTULOSE 20 G: 10 SOLUTION ORAL at 20:54

## 2021-03-18 NOTE — PROGRESS NOTES
Inpatient Progress Note  Debra Resendiz  Date: 03/18/21  MRN: [id number]      Subjective:   Patient is postop day #2 status post left olecranon open reduction internal fixation.  Patient's pain is controlled.  She is drowsy on exam today.  She notes no new complaints.  Patient is afebrile, temperature 97.7 °F.  Patient hemoglobin is 11.2 and is stable.           Objective:    Vitals:    03/18/21 0704 03/18/21 1100 03/18/21 1400 03/18/21 1447   BP: 119/60 129/63 119/59    BP Location: Right arm Right arm Right arm    Patient Position: Lying Lying Lying    Pulse: 84 79 83    Resp: 18 18 18    Temp: 97.5 °F (36.4 °C) 97.5 °F (36.4 °C) 97.7 °F (36.5 °C)    TempSrc: Oral Oral Oral    SpO2: 97% 97% 96% 98%   Weight:       Height:              Physical Exam:  Constitutional: Chronically ill.  No respiratory distress.        Musculoskeletal: Dressings are clean dry and intact left upper extremity.  Splint is in place left upper extremity.  Patient neurovascularly is intact left upper extremity.  Cap refill less than 3 seconds distal digits left hand.  No cyanosis left upper extremity noted.      Labs:    Results from last 7 days   Lab Units 03/18/21 0229 03/17/21 0056 03/16/21 0144 03/14/21  1305   CRP mg/dL 3.83* 2.17* 1.27*  --    LACTATE mmol/L  --   --   --  1.4   WBC 10*3/mm3 8.30 8.67 8.60  --    HEMOGLOBIN g/dL 11.2* 11.1* 11.2*  --    HEMATOCRIT % 35.1 35.4 35.4  --    MCV fL 99.4* 100.6* 100.9*  --    MCHC g/dL 31.9 31.4* 31.6  --    PLATELETS 10*3/mm3 347 273 321  --          Results from last 7 days   Lab Units 03/18/21 0229 03/17/21 0056 03/16/21  0144 03/14/21  0109 03/13/21  0322   SODIUM mmol/L 135* 134* 134* 140 136   POTASSIUM mmol/L 4.2 4.4 4.4 3.7 4.3   MAGNESIUM mg/dL  --   --   --  1.7 1.9   CHLORIDE mmol/L 103 104 105 111* 104   CO2 mmol/L 23.9 21.7* 22.7 14.1* 23.9   BUN mg/dL 10 10 14 17 15   CREATININE mg/dL 0.43* 0.49* 0.47* 0.41* 0.53*   EGFR IF NONAFRICN AM mL/min/1.73 144 123 130 >150 113    CALCIUM mg/dL 9.3 8.8 9.2 7.8* 9.2   GLUCOSE mg/dL 98 96 88 74 94   ALBUMIN g/dL 2.90*  --  2.97*  --  3.22*   BILIRUBIN mg/dL 0.3  --  0.3  --  0.5   ALK PHOS U/L 104  --  96  --  100   AST (SGOT) U/L 25  --  21  --  18   ALT (SGPT) U/L 19  --  14  --  15   Estimated Creatinine Clearance: 44.9 mL/min (A) (by C-G formula based on SCr of 0.43 mg/dL (L)).  No results found for: AMMONIA  Results from last 7 days   Lab Units 03/14/21  2154   TROPONIN T ng/mL <0.010         No results found for: HGBA1C  Lab Results   Component Value Date    TSH 2.243 04/08/2018         Pain Management Panel    There is no flowsheet data to display.         Blood Culture   Date Value Ref Range Status   03/13/2021 No growth at 5 days  Final   03/13/2021 No growth at 5 days  Final     Urine Culture   Date Value Ref Range Status   03/13/2021 >100,000 CFU/mL Escherichia coli ESBL (A)  Final     Comment:     Consider infectious disease consult.  Susceptibility results may not correlate to clinical outcomes.     No results found for: WOUNDCX  No results found for: STOOLCX              Radiology:  Imaging Results (Last 72 Hours)     Procedure Component Value Units Date/Time    FL Surgery Fluoro [[id number]] Collected: 03/16/21 1206     Updated: 03/16/21 1208    Narrative:      EXAM:    FL Fluoroscopy < 1 Hour     EXAM DATE:    3/16/2021 7:57 AM     CLINICAL HISTORY:    ORIF LT elbow; S42.402B-Unspecified fracture of lower end of left  humerus, initial encounter for open fracture     TECHNIQUE:    Fluoroscopic images performed in multiple projections.  Fluoroscopic  guidance was provided by a physician.     COMPARISON:    No relevant prior studies available.     FINDINGS:    BONES/JOINTS:  Proximal ulna plate-screw fixation hardware noted.       Impression:        As above.     This report was finalized on 3/16/2021 12:06 PM by Dr. Kirit Gibson MD.               Assessment:   1.Status post left olecranon open reduction internal  fixation          Plan:   Patient is doing well from orthopedic standpoint, pain is controlled.  The patient is to leave splint and dressings in place to left upper extremity until follow-up.  Patient will follow-up in 2 weeks with orthopedics.  Patient is to be nonweightbearing left upper extremity.  No postoperative complications is noted.  Again as previously dictated this was not an open fracture.        Diaz Elena, SAMANTHA March 18, 2021 17:16 EDT

## 2021-03-18 NOTE — DISCHARGE PLACEMENT REQUEST
"Debra Resendiz (75 y.o. Female)     Date of Birth Social Security Number Address Home Phone MRN    1945  [address]  [place] [address] [phone] [id number]    Sikh Marital Status          None        Admission Date Admission Type Admitting Provider Attending Provider Department, Room/Bed    3/13/21 Urgent Ngozi Milligan MD Heinss, Karl F, MD [address], 3306/1S    Discharge Date Discharge Disposition Discharge Destination                       Attending Provider: Sang Epps MD    Allergies: Penicillins    Isolation: Contact   Infection: MDRO (03/15/21), ESBL E coli (03/15/21)   Code Status: No CPR    Ht: 167.6 cm (66\")   Wt: 46.1 kg (101 lb 11.2 oz)    Admission Cmt: None   Principal Problem: Elbow fracture, left, open, initial encounter [S42.402B]                 Active Insurance as of 3/13/2021     Primary Coverage     Payor Plan Insurance Group Employer/Plan Group    MEDICARE MEDICARE A & B      Payor Plan Address Payor Plan Phone Number Payor Plan Fax Number Effective Dates    [address] [phone]  8/1/2011 - None Entered    [facility] [id number]       Subscriber Name Subscriber Birth Date Member ID       DEBRA RESENDIZ 1945 [id number]           Secondary Coverage     Payor Plan Insurance Group Employer/Plan Group    [place]      Payor Plan Address Payor Plan Phone Number Payor Plan Fax Number Effective Dates    [address] [phone]  4/6/2018 - None Entered    [place] [address]       Subscriber Name Subscriber Birth Date Member ID       DEBRA RESENDIZ 1945 [id number]                 Emergency Contacts      (Rel.) Home Phone Work Phone Mobile Phone    Diaz Resendiz (Son) [phone] -- --            Emergency Contact Information     Name Relation Home Work Mobile    Diaz Resendiz Son [phone]            Insurance Information                MEDICARE/MEDICARE A & B Phone: [phone]    Subscriber: " Debra Resendiz Subscriber#: [id number]    Group#:  Precert#:         [place] Phone: [phone]    Subscriber: Debra Resendiz Subscriber#: [id number]    Group#:  Precert#:           Treatment Team  Chat With All Active Members    Provider Relationship Specialty Contact    Sang Epps MD  Attending --  [phone]    Michael Willis, PCT  Patient Care Technician --     Ryley Stoddard MD  Consulting Physician, Surgeon Orthopedic Surgery  [phone]    Williams Mccollum DO  Surgeon Orthopedic Surgery  [phone]    Jose Briseno, TAMEKA  Registered Nurse --     Sidra Metcalf  Unit [place] Log --     Lucia Guillen, RRT  Respiratory Therapist --  [phone]    KfDiana stringer MD  Consulting Physician Infectious Diseases  [phone]    Pooja Reza RN  Registered Nurse --     Brissa Petersen, PT  Physical Therapist Physical Therapy           Problem List         Codes Noted - Resolved       Hospital    * (Principal) Elbow fracture, left, open, initial encounter ICD-10-CM: S42.402B  ICD-9-CM: 812.50 3/13/2021 - Present    Former smoker (Chronic) ICD-10-CM: Z87.891  ICD-9-CM: V15.82 3/13/2021 - Present    Nursing home resident ICD-10-CM: Z59.3  ICD-9-CM: V60.6 3/13/2021 - Present       Non-Hospital    Closed displaced comminuted fracture of shaft of left humerus ICD-10-CM: S42.352A  ICD-9-CM: 812.21 2018 - Present             History & Physical      Zeinab Coates PA at 21 0312     Attestation signed by Ngozi Milligan MD at 21 6127    I agree with the assessment and plan and I have discussed it with the advanced practice clinician (APC):  Zeinab Coates PA-C.                        [facility]  HISTORY & PHYSICAL    Patient Identification:  Name:  Debra Resendiz  Age:  75 y.o.  Sex:  female  :  1945  MRN:  [id number]   Visit Number:  [id number]  Admit Date: 3/13/2021   Primary Care Physician:  Leigh Ann Fu  MD AZUL     Subjective     Chief complaint:   Transfer from [facility]     History of presenting illness:   Patient is a 75 y.o. female nursing home resident at the [facility] with past medical history significant for Parkinson's disease, frequent falls, and generalized anxiety disorder that was transferred to this facility from [facility] ED for evaluation of left open olecranon fracture.  Patient is sitting up in bed, no acute distress noted.  She is alert and oriented to self and time but not place.  Patient is unable to provide significant history of presenting illness due to underlying Parkinson's.  She does not remember events leading to fall.  Per review of [facility] records, patient was seen at the [facility] on 3/10/2021.  Per report she had filled out the nursing home, patient rarely ambulates and utilizes wheelchair.  A splint was placed to the left upper extremity at the ED.  She was discharged from the emergency department and was scheduled for outpatient follow-up with Ortho PDX surgery.  Patient was sent back to the emergency department on 3/12/2021 from orthopedic surgeons office for concerns of open fracture with recommendation to transfer to higher level care facility.  Per review of ED documentation from 3/12/2021 at [facility], splint was removed and there was sanguinous drainage over the splint from the posterior aspect of the elbow.  There is skin abrasion noted over the proximal ulna that was small.  There is also a skin tear noted over the posterior lateral aspect of the elbow.  Of concern was a 10 x 10 mm wound over the lateral aspect of the proximal ulna which was actively bleeding.  This particular wound was closed in proximity to the fracture site.  There is swelling over the posterior aspect of the elbow as well with no forearm swelling.  Patient had palpable pulses, good range of motion of all fingers.  She had  tenderness over the olecranon process.     Review of [facility] Records and [facility] Records   Left elbow x-ray [facility] 3/10/2021 2340  Impression: Mild osteopenia, mildly displaced fracture involving the base of the olecranon process region of the articular surface, significant associated soft tissue edema posterior to the proximal aspect of the ulna.  Mild joint effusion with elevation of the anterior posterior fat pad.  Left wrist x-ray [facility] 3/10/2021 2221  No acute bony abnormality, diffuse osteopenia noted.  CMP:Sodium 141, potassium 4.2, chloride 105, CO2 30.2, anion gap 5.8, BUN 22, creatinine 0.71, EGFR greater than 60, BUN/creatinine ratio 31, glucose 87, calcium 9.2, AST 21, ALT 28, alkaline phosphatase 104, albumin 3.0.  Urinalysis: Kathy cloudy appearance, negative ketones, positive nitrite, 1+ leukocyte esterase, 6-10 WBC, 1+ squamous epithelial cells, 4+ bacteria    CBC: White blood cell count 10.2, hemoglobin 11.7, hematocrit 36.0, MCV 97.8, platelets 337  COVID-19 screening negative  Influenza A and B negative   RSV negative     Home medications:  Acetaminophen 650 mg every 12 hour  Apixaban 2.5 mg twice daily  Benztropine 1 mg p.o. twice daily  Buspirone 15 mg p.o. twice daily  Citalopram 10 mg p.o. daily  Ferrous sulfate 325 mg delayed release p.o. daily  Hydrocodone 5 mg-acetaminophen 325 mg 1 tablet p.o. every 6 hours as needed  Lactulose 20 g / 30 mL p.o. nightly  Melatonin 10 mg p.o. at bedtime as needed    Per chart review at this facility, patient was admitted to this facility on 4/6/2020 1834/13/2018 where she was treated for traumatic left humeral fracture and right tibial plateau fracture status post mechanical fall at home.  Patient was also noted to have indeterminate troponin elevation.  Cardiology evaluated the patient, she did not have any cardiac history or family history of coronary artery disease.  A transthoracic echocardiogram performed revealing  preserved ejection fraction with no abnormalities.  Patient is cleared by cardiology for surgery.  Patient underwent ORIF for left humeral fracture.  For the tibial plateau fracture, she was treated with nonweightbearing status for 6 weeks and physical therapy.  During admission, it was felt that patient may have early parkinsonism due to history of cogwheeling, shuffling gait prior to admission, and a masked face.  She was started on Sinemet.  Patient's preoperative chest x-ray did reveal question of atypical pneumonia, she was treated with a course of p.o. doxycycline.  At discharge, patient was in stable condition.  She was discharged to the [facility] and rehab for skilled nursing and rehabilitation therapy for fractures.    On my exam, patient is sitting up in bed, no acute distress noted.  Patient is pleasant.  She does report some pain in her left elbow.  She denies any numbness or tingling, no paresthesias.  She has good range of motion of her digits on the left hand.  She denies any chest pain or dyspnea.  She denies any abdominal pain, nausea, vomiting or change in bowel movements.  She denies any fevers or chills.  No cough.  She denies any urinary symptoms.  No lower extremity edema.  She does state that she typically uses a wheelchair at the nursing, will only transfer from bed to chair.  She does not ambulate very often.  She does report having frequent falls.  She is on Eliquis per review of nursing home records, however I cannot find a diagnosis as to why she is chronically anticoagulated.  It appears her last dose was on 3/12/2021. I did try to contact patient's Son Diaz Resendiz at [phone] with no success. Per transfer records, patient's son was made aware of transfer and authorized.     Patient has been admitted to the telemetry floor for further evaluation and treatment.     Present during exam: [place] ENGLISH    ---------------------------------------------------------------------------------------------------------------------   Review of Systems   Constitutional: Negative for activity change, appetite change, chills, diaphoresis, fatigue and fever.   HENT: Negative for congestion, postnasal drip, rhinorrhea, sinus pain, sore throat and trouble swallowing.    Eyes: Negative for discharge and visual disturbance.   Respiratory: Negative for cough, chest tightness, shortness of breath and wheezing.    Cardiovascular: Negative for chest pain, palpitations and leg swelling.   Gastrointestinal: Negative for abdominal pain, constipation, diarrhea, nausea and vomiting.   Endocrine: Negative for cold intolerance and heat intolerance.   Genitourinary: Negative for decreased urine volume, dysuria, frequency and urgency.   Musculoskeletal: Negative for arthralgias, gait problem and myalgias.        Left elbow pain    Skin: Negative for color change, rash and wound.   Allergic/Immunologic: Negative for environmental allergies and immunocompromised state.   Neurological: Negative for dizziness, syncope, weakness, light-headedness and headaches.   Hematological: Negative for adenopathy. Does not bruise/bleed easily.   Psychiatric/Behavioral: Negative for confusion and decreased concentration. The patient is not nervous/anxious.       ---------------------------------------------------------------------------------------------------------------------   No past medical history on file.  Past Surgical History:   Procedure Laterality Date   • HIP SURGERY     • ORIF HUMERUS FRACTURE Left 4/8/2018    Procedure: LEFT HUMERUS PROXIMAL OPEN REDUCTION INTERNAL FIXATION;  Surgeon: Dani Aburto MD;  Location: [facility];  Service: Orthopedics     No family history on file.  Social History     Socioeconomic History   • Marital status:      Spouse name: Not on file   • Number of children: Not on file   • Years of education: Not on file    • Highest education level: Not on file   Tobacco Use   • Smoking status: Former Smoker   Substance and Sexual Activity   • Alcohol use: No   • Drug use: No     ---------------------------------------------------------------------------------------------------------------------   Allergies:  Penicillins  ---------------------------------------------------------------------------------------------------------------------   Medications below are reported home medications pulling from within the system; at this time, these medications have not been reconciled unless otherwise specified and are in the verification process for further verifcation as current home medications.    Prior to Admission Medications     Prescriptions Last Dose Informant Patient Reported? Taking?    diphenhydrAMINE (BENADRYL) 25 mg capsule  Self Yes No    Take 25 mg by mouth 2 (Two) Times a Day As Needed for Itching, Allergies or Sleep.    docusate sodium 100 MG capsule   No No    Take 200 mg by mouth 2 (Two) Times a Day.    ferrous sulfate 325 (65 FE) MG tablet   No No    Take 1 tablet by mouth Daily With Breakfast.    multivitamin (DAILY RED) tablet tablet  Self Yes No    Take 1 tablet by mouth Daily.    polyethylene glycol (MIRALAX) pack packet   No No    Take 17 g by mouth Daily.        ---------------------------------------------------------------------------------------------------------------------    Objective     Hospital Scheduled Meds:  pantoprazole, 40 mg, Oral, Q AM  sodium chloride, 10 mL, Intravenous, Q12H           Current listed hospital scheduled medications may not yet reflect those currently placed in orders that are signed and held, awaiting patient's arrival to floor/unit.    ---------------------------------------------------------------------------------------------------------------------   Vital Signs:  Temp:  [97.7 °F (36.5 °C)] 97.7 °F (36.5 °C)  Heart Rate:  [85] 85  Resp:  [18] 18  BP: (129)/(58) 129/58  Mean  Arterial Pressure (Non-Invasive) for the past 24 hrs (Last 3 readings):   Noninvasive MAP (mmHg)   03/13/21 0300 79     SpO2 Percentage    03/13/21 0300   SpO2: 92%     SpO2:  [92 %] 92 % on room air      Body mass index is 20.05 kg/m².  Wt Readings from Last 3 Encounters:   04/13/18 56.3 kg (124 lb 3.2 oz)       ---------------------------------------------------------------------------------------------------------------------   Physical Exam:  Physical Exam  Nursing note reviewed.   Constitutional:       General: She is awake. She is not in acute distress.     Appearance: She is well-developed. She is not toxic-appearing.      Comments: Elderly, pleasant, sitting up in bed, no acute distress.  Thin.   HENT:      Head: Normocephalic and atraumatic.      Right Ear: External ear normal.      Left Ear: External ear normal.      Nose: Nose normal.      Mouth/Throat:      Mouth: Mucous membranes are moist.      Pharynx: Oropharynx is clear.   Eyes:      Extraocular Movements: Extraocular movements intact.      Conjunctiva/sclera: Conjunctivae normal.      Pupils: Pupils are equal, round, and reactive to light.   Cardiovascular:      Rate and Rhythm: Normal rate and regular rhythm.      Pulses:           Dorsalis pedis pulses are 2+ on the right side and 2+ on the left side.        Posterior tibial pulses are 2+ on the right side and 2+ on the left side.      Heart sounds: Normal heart sounds. No murmur. No friction rub. No gallop.    Pulmonary:      Effort: Pulmonary effort is normal. No tachypnea, accessory muscle usage or respiratory distress.      Breath sounds: Normal breath sounds and air entry. No wheezing, rhonchi or rales.      Comments: Patient speaks in full sentences without any dyspnea.  On room air.  Chest:      Chest wall: No tenderness.   Abdominal:      General: Bowel sounds are normal. There is no distension.      Palpations: Abdomen is soft. There is no hepatomegaly or splenomegaly.      Tenderness:  There is no abdominal tenderness. There is no guarding or rebound.      Hernia: No hernia is present.   Genitourinary:     Comments: No gaxiola catheter in place.  Musculoskeletal:      Left elbow: Deformity (Bulky occlusive cast in place) present.      Cervical back: Normal range of motion and neck supple.      Right lower leg: No edema.      Left lower leg: No edema.      Comments: Distal pulses intact bilateral upper extremities.  Cap refill intact.  No decrease in sensation or strength.  Patient has good range of motion of digits on left hand.   Skin:     General: Skin is warm and dry.      Capillary Refill: Capillary refill takes less than 2 seconds.      Findings: No abscess, erythema, lesion or wound.   Neurological:      General: No focal deficit present.      Mental Status: She is alert. She is disoriented.      Cranial Nerves: Cranial nerves are intact.      Sensory: Sensation is intact.      Motor: Weakness (Generalized, secondary to underlying dementia and advanced age.  No lateralizing weakness noted.) and atrophy (Global, age-related/chronic illness related) present.      Comments: Awake and alert.  Oriented to self and year, otherwise disoriented.  Follows commands. Answers questions appropriately. Moves all extremities equally. Strength and sensation intact. No focal neuro deficit on exam.   Psychiatric:         Attention and Perception: Attention normal.         Mood and Affect: Mood normal. Affect is flat.         Speech: Speech is delayed (Slightly).         Behavior: Behavior is cooperative.         Cognition and Memory: Memory is impaired.       ---------------------------------------------------------------------------------------------------------------------  EKG:  Pending cardiology read. Per my interpretation, NSR with HR 84 BPM, QTc WNL at 427 m/s, no acute ST-T wave abnormality, no ST elevation. Await final cardiology read.     Telemetry:    Sinus 70s    I have personally reviewed the  EKG/Telemetry strip  ---------------------------------------------------------------------------------------------------------------------             Results from last 7 days   Lab Units 03/13/21  0322   WBC 10*3/mm3 8.69   HEMOGLOBIN g/dL 11.6*   HEMATOCRIT % 36.2   MCV fL 98.4*   MCHC g/dL 32.0   PLATELETS 10*3/mm3 309         Invalid input(s): PROTCrCl cannot be calculated (Patient's most recent lab result is older than the maximum 30 days allowed.).    No results found for: HGBA1C, POCGLU  Lab Results   Component Value Date    HGBA1C 5.20 04/06/2018     Lab Results   Component Value Date    TSH 2.243 04/08/2018     Microbiology Results (last 10 days)     ** No results found for the last 240 hours. **         Pain Management Panel    There is no flowsheet data to display.       I have personally reviewed the above laboratory results.   ---------------------------------------------------------------------------------------------------------------------  Imaging Results (Last 7 Days)     ** No results found for the last 168 hours. **        I have personally reviewed the above radiology results.     Last Echocardiogram:  Results for orders placed during the hospital encounter of 04/06/18    Adult Transthoracic Echo Complete W/ Cont if Necessary Per Protocol    Interpretation Summary  · Left ventricular systolic function is hyperdynamic (EF > 70%). Estimated EF appears to be in the range of 66 - 70%. Normal left ventricular cavity size and wall thickness noted.  · The study is technically suboptimal for diagnosis.    ---------------------------------------------------------------------------------------------------------------------    Assessment & Plan      -Acute, traumatic, open, left olecranon fracture   · STAT labs ordered   · Preoperative EKG and CXR ordered   · Cast in place from OSH, not removed as it was placed in the ED  · Obtain x-ray of left elbow, no films were sent with patient  · Orthopedic surgery  consulted, input/assistance is much appreciated   · NPO diet for impending surgery   · PRN pain control   · NV checks   · Blood cultures ordered, follow for final results  · Continue empiric coverage with IV vancomycin and Azactam and Flagyl  · Osteopenia noted on radiology report from [facility]: obtain vitamin D level     -Parkinson's disease  · Supportive care  · Resume home medication regimen reconciled per pharmacy    -Generalized anxiety disorder  · Supportive care  · Resume home medication regimen once reconciled per pharmacy    -Chronic anticoagulation  · Unclear diagnosis  · Patient with history of frequent falls, patient not ambulatory typically.  She does transfer from bed to wheelchair.  · Last dose of Eliquis was approximately 3/12/2021 at [facility]  · Plan to hold for now and pending possible surgical intervention  · CBC ordered  · No active bleeding noted or appreciated on exam  · SCDs for VTE prophylaxis    -Former smoker     -F/E/N  • Gentle IV fluids. Replace electrolytes per protocol as necessary. NPO diet.     ---------------------------------------------------  DVT Prophylaxis: SCDs   GI Prophylaxis: PPI  Activity: Strict bed rest, turn Q2H, fall precautions     The patient is considered to be a high risk patient due to: Left olecranon fracture, open fracture, Parkinson's disease, anxiety, chronic anticoagulation, former smoker    INPATIENT status due to the need for care which can only be reasonably provided in an hospital setting such as aggressive/expedited ancillary services and/or consultation services, the necessity for IV medications, close physician monitoring and/or the possible need for procedures.  In such, I feel patient’s risk for adverse outcomes and need for care warrant INPATIENT evaluation and predict the patient’s care encounter to likely last beyond 2 midnights.    Code Status: DNR/DNI per NH records     I have discussed the patient's assessment and plan with the patient, nursing  staff, and attending physician Ngozi Zhong MD Allyson O'Kuma, PA-C  Hospitalist [facility]   Pager: [phone]    03/13/21  03:57 EST    Attending Physician: Ngozi Milligan MD        ---------------------------------------------------------------------------------------------------------------------          Electronically signed by Ngozi Milligan MD at 03/13/21 0748       Vital Signs (last day)     Date/Time   Temp   Temp src   Pulse   Resp   BP   Patient Position   SpO2    03/18/21 1100   97.5 (36.4)   Oral   79   18   129/63   Lying   97    03/18/21 0704   97.5 (36.4)   Oral   84   18   119/60   Lying   97    03/18/21 0100   98.1 (36.7)   Oral   86   16   135/63   Lying   92    03/17/21 1856   97.3 (36.3)   Oral   89   18   112/49   Lying   94    03/17/21 1505   --   --   --   --   114/48   Lying   --    03/17/21 1459   98 (36.7)   Oral   93   18   98/49   Lying   94    03/17/21 0950   --   --   103   18   125/54   Lying   92    03/17/21 0600   99 (37.2)   Oral   99   18   142/83   Lying   90    03/17/21 0300   97.6 (36.4)   Oral   94   16   120/52   Lying   91              Lines, Drains & Airways    Active LDAs     Name:   Placement date:   Placement time:   Site:   Days:    Peripheral IV 03/15/21 0845 Right;Medial;Anterior Antecubital   03/15/21    0845    Antecubital   3                  Current Facility-Administered Medications   Medication Dose Route Frequency Provider Last Rate Last Admin   • acetaminophen (TYLENOL) tablet 650 mg  650 mg Oral Q6H PRN Williams Mccollum,        • aspirin EC tablet 325 mg  325 mg Oral Daily Williams Mccollum DO   325 mg at 03/18/21 0843   • b complex-C-folic acid capsule 1 mg  1 capsule Oral Daily Williams Mccollum DO   1 mg at 03/18/21 0843   • benztropine (COGENTIN) tablet 1 mg  1 mg Oral BID Williams Mccollum,    1 mg at 03/18/21 0843   • bisacodyl (DULCOLAX) suppository 10 mg  10 mg Rectal Daily PRN Williams Mccollum,        •  busPIRone (BUSPAR) tablet 15 mg  15 mg Oral TID Williams Mccollum, DO   15 mg at 03/18/21 0843   • citalopram (CeleXA) tablet 10 mg  10 mg Oral Nightly Williams Mccollum, DO   10 mg at 03/17/21 1949   • docusate sodium (COLACE) capsule 100 mg  100 mg Oral BID Williams Mccollum, DO   100 mg at 03/18/21 0843   • ertapenem (INVanz) 1 g/100 mL 0.9% NS VTB (mbp)  1 g Intravenous Q24H Diana Allison MD   1 g at 03/17/21 1645   • ferrous sulfate tablet 325 mg  325 mg Oral BID Williams Mccollum, DO   325 mg at 03/18/21 0843   • HYDROcodone-acetaminophen (NORCO) 7.5-325 MG per tablet 1 tablet  1 tablet Oral Q4H PRN Williams Mccollum, DO   1 tablet at 03/17/21 0947   • HYDROmorphone (DILAUDID) injection 0.25 mg  0.25 mg Intravenous Q4H PRN Williams Mccollum, DO        And   • naloxone (NARCAN) injection 0.4 mg  0.4 mg Intravenous Q5 Min PRN Williams Mccollum, DO       • lactobacillus acidophilus (RISAQUAD) capsule 1 capsule  1 capsule Oral Daily Williams Mccollum, DO   1 capsule at 03/18/21 0843   • lactulose (CHRONULAC) 10 GM/15ML solution 20 g  20 g Oral Nightly Williams Mccollum, DO   20 g at 03/17/21 1950   • magnesium hydroxide (MILK OF MAGNESIA) suspension 2400 mg/10mL 5 mL  5 mL Oral Daily PRN Williams Mccollum, DO       • Magnesium Sulfate 2 gram infusion - Mg less than or equal to 1.5 mg/dL  2 g Intravenous PRN Williams Mccollum, DO        Or   • Magnesium Sulfate 1 gram infusion - Mg 1.6-1.9 mg/dL  1 g Intravenous PRN Williams Mccollum, DO       • melatonin tablet 10 mg  10 mg Oral Nightly PRN Williams Mccollum, DO   10 mg at 03/15/21 2048   • morphine injection 1 mg  1 mg Intravenous Q4H PRN Williams Mccollum, DO   1 mg at 03/15/21 1731    And   • naloxone (NARCAN) injection 0.4 mg  0.4 mg Intravenous Q5 Min PRN Williams Mccollum, DO       • nitroglycerin (NITROSTAT) SL tablet 0.4 mg  0.4 mg Sublingual Q5 Min PRN Williams Mccollum, DO       • ondansetron (ZOFRAN) tablet 4 mg  4 mg Oral Q6H PRN Williams Mccollum,         Or   • ondansetron  (ZOFRAN) injection 4 mg  4 mg Intravenous Q6H PRN Franson, Williams K, DO       • pantoprazole (PROTONIX) EC tablet 40 mg  40 mg Oral Q AM Maxnson, Williams K, DO   40 mg at 03/18/21 0508   • polyethylene glycol (MIRALAX) packet 17 g  17 g Oral Daily Franson, Williams K, DO   17 g at 03/18/21 0844   • potassium & sodium phosphates (PHOS-NAK) 280-160-250 MG packet - for Phosphorus 1.3-2.5 mg/dL  1 packet Oral BID PRN Franson, Williams K, DO       • potassium chloride 10 mEq in 100 mL IVPB  10 mEq Intravenous Q1H PRN Maxnson, Williams K, DO       • senna (SENOKOT) tablet 1 tablet  1 tablet Oral BID Maxnson, Williams K, DO   1 tablet at 03/18/21 0843   • sodium chloride 0.9 % flush 10 mL  10 mL Intravenous Q12H Franson, Williams K, DO   10 mL at 03/15/21 2049   • sodium chloride 0.9 % flush 10 mL  10 mL Intravenous PRN Franson, Williams K, DO       • sodium chloride 0.9 % flush 10 mL  10 mL Intravenous Q12H Franson, Williams K, DO   10 mL at 03/18/21 0843   • sodium chloride 0.9 % flush 10 mL  10 mL Intravenous PRN Franson, Williams K, DO       • vitamin D3 capsule 5,000 Units  5,000 Units Oral Daily Franson, Williams K, DO   5,000 Units at 03/18/21 0843       Lab Results (last 24 hours)     Procedure Component Value Units Date/Time    Blood Culture - Blood, Arm, Right [[id number]] Collected: 03/13/21 0502    Specimen: Blood from Arm, Right Updated: 03/18/21 0630     Blood Culture No growth at 5 days    Blood Culture - Blood, Hand, Left [[id number]] Collected: 03/13/21 0502    Specimen: Blood from Hand, Left Updated: 03/18/21 0630     Blood Culture No growth at 5 days    Comprehensive Metabolic Panel [[id number]]  (Abnormal) Collected: 03/18/21 0229    Specimen: Blood Updated: 03/18/21 0313     Glucose 98 mg/dL      BUN 10 mg/dL      Creatinine 0.43 mg/dL      Sodium 135 mmol/L      Potassium 4.2 mmol/L      Chloride 103 mmol/L      CO2 23.9 mmol/L      Calcium 9.3 mg/dL      Total Protein 6.0 g/dL      Albumin 2.90 g/dL      ALT (SGPT) 19 U/L      AST (SGOT) 25  U/L      Alkaline Phosphatase 104 U/L      Total Bilirubin 0.3 mg/dL      eGFR Non African Amer 144 mL/min/1.73      Globulin 3.1 gm/dL      A/G Ratio 0.9 g/dL      BUN/Creatinine Ratio 23.3     Anion Gap 8.1 mmol/L     Narrative:      GFR Normal >60  Chronic Kidney Disease <60  Kidney Failure <15      C-reactive Protein [[id number]]  (Abnormal) Collected: 03/18/21 0229    Specimen: Blood Updated: 03/18/21 0313     C-Reactive Protein 3.83 mg/dL     CBC & Differential [[id number]]  (Abnormal) Collected: 03/18/21 0229    Specimen: Blood Updated: 03/18/21 0244    Narrative:      The following orders were created for panel order CBC & Differential.  Procedure                               Abnormality         Status                     ---------                               -----------         ------                     CBC Auto Differential[[id number]]        Abnormal            Final result                 Please view results for these tests on the individual orders.    CBC Auto Differential [[id number]]  (Abnormal) Collected: 03/18/21 0229    Specimen: Blood Updated: 03/18/21 0244     WBC 8.30 10*3/mm3      RBC 3.53 10*6/mm3      Hemoglobin 11.2 g/dL      Hematocrit 35.1 %      MCV 99.4 fL      MCH 31.7 pg      MCHC 31.9 g/dL      RDW 13.6 %      RDW-SD 49.4 fl      MPV 9.2 fL      Platelets 347 10*3/mm3      Neutrophil % 64.7 %      Lymphocyte % 21.1 %      Monocyte % 10.2 %      Eosinophil % 2.7 %      Basophil % 0.6 %      Immature Grans % 0.7 %      Neutrophils, Absolute 5.37 10*3/mm3      Lymphocytes, Absolute 1.75 10*3/mm3      Monocytes, Absolute 0.85 10*3/mm3      Eosinophils, Absolute 0.22 10*3/mm3      Basophils, Absolute 0.05 10*3/mm3      Immature Grans, Absolute 0.06 10*3/mm3      nRBC 0.0 /100 WBC         Orders (last 24 hrs)      Start     Ordered    03/18/21 1356  Diet Soft Texture; Ground  Diet Effective Now      03/18/21 1355    03/18/21 0600  Comprehensive Metabolic Panel  Morning Draw      03/17/21  1639    03/18/21 0600  CBC & Differential  Morning Draw      03/17/21 1639    03/18/21 0600  CBC Auto Differential  PROCEDURE ONCE      03/18/21 0005    03/17/21 0832  C-reactive Protein  Daily      03/17/21 0831    03/16/21 1600  ertapenem (INVanz) 1 g/100 mL 0.9% NS VTB (mbp)  Every 24 Hours      03/15/21 1437    03/16/21 1400  aspirin EC tablet 325 mg  Daily      03/16/21 0950    03/16/21 0952  sodium chloride 0.9 % flush 10 mL  Every 12 Hours Scheduled      03/16/21 0950    03/16/21 0949  HYDROcodone-acetaminophen (NORCO) 7.5-325 MG per tablet 1 tablet  Every 4 Hours PRN      03/16/21 0950    03/16/21 0949  naloxone (NARCAN) injection 0.4 mg  Every 5 Minutes PRN      03/16/21 0950    03/16/21 0949  HYDROmorphone (DILAUDID) injection 0.25 mg  Every 4 Hours PRN      03/16/21 0950    03/16/21 0948  Inspect Skin Integrity Every Shift; Be Alert for Breakdown  Every Shift      03/16/21 0950    03/16/21 0948  Strict Intake and Output  Every Shift      03/16/21 0950    03/16/21 0947  sodium chloride 0.9 % flush 10 mL  As Needed      03/16/21 0950    03/16/21 0947  ondansetron (ZOFRAN) tablet 4 mg  Every 6 Hours PRN      03/16/21 0950    03/16/21 0947  ondansetron (ZOFRAN) injection 4 mg  Every 6 Hours PRN      03/16/21 0950    03/15/21 1800  Dietary Nutrition Supplements Magic Cup  Daily With Lunch & Dinner      03/15/21 1517    03/14/21 1000  vitamin D3 capsule 5,000 Units  Daily      03/14/21 0830    03/14/21 0900  polyethylene glycol (MIRALAX) packet 17 g  Daily      03/13/21 2155    03/14/21 0900  b complex-C-folic acid capsule 1 mg  Daily      03/13/21 2155    03/13/21 2245  docusate sodium (COLACE) capsule 100 mg  2 Times Daily      03/13/21 2155 03/13/21 2245  citalopram (CeleXA) tablet 10 mg  Nightly      03/13/21 2155 03/13/21 2245  lactulose (CHRONULAC) 10 GM/15ML solution 20 g  Nightly      03/13/21 2155 03/13/21 2245  benztropine (COGENTIN) tablet 1 mg  2 Times Daily      03/13/21 2155 03/13/21  2245  ferrous sulfate tablet 325 mg  2 Times Daily      03/13/21 2155 03/13/21 2245  senna (SENOKOT) tablet 1 tablet  2 Times Daily      03/13/21 2155 03/13/21 2245  busPIRone (BUSPAR) tablet 15 mg  3 Times Daily      03/13/21 2155 03/13/21 2154  bisacodyl (DULCOLAX) suppository 10 mg  Daily PRN      03/13/21 2155 03/13/21 2154  melatonin tablet 10 mg  Nightly PRN      03/13/21 2155 03/13/21 2154  magnesium hydroxide (MILK OF MAGNESIA) suspension 2400 mg/10mL 5 mL  Daily PRN      03/13/21 2155 03/13/21 0900  sodium chloride 0.9 % flush 10 mL  Every 12 Hours Scheduled      03/13/21 0312 03/13/21 0900  lactobacillus acidophilus (RISAQUAD) capsule 1 capsule  Daily      03/13/21 0517    03/13/21 0600  pantoprazole (PROTONIX) EC tablet 40 mg  Every Early Morning      03/13/21 0322 03/13/21 0515  potassium & sodium phosphates (PHOS-NAK) 280-160-250 MG packet - for Phosphorus 1.3-2.5 mg/dL  2 Times Daily PRN      03/13/21 0515    03/13/21 0515  Magnesium Sulfate 2 gram infusion - Mg less than or equal to 1.5 mg/dL  As Needed      03/13/21 0515    03/13/21 0515  Magnesium Sulfate 1 gram infusion - Mg 1.6-1.9 mg/dL  As Needed      03/13/21 0515    03/13/21 0515  potassium chloride 10 mEq in 100 mL IVPB  Every 1 Hour PRN      03/13/21 0515    03/13/21 0400  Vital Signs  Every 4 Hours      03/13/21 0312    03/13/21 0400  Neurovascular Checks  Every 4 Hours      03/13/21 0322    03/13/21 0322  acetaminophen (TYLENOL) tablet 650 mg  Every 6 Hours PRN      03/13/21 0322    03/13/21 0313  Patient Currently On Electrolyte Replacement Protocol - Please Refer to MAR for Protocol Details  Misc Nursing Order (Specify)  Daily     Comments: Patient Currently On Electrolyte Replacement Protocol - Please Refer to MAR for Protocol Details    03/13/21 0312 03/13/21 0311  morphine injection 1 mg  Every 4 Hours PRN      03/13/21 0312    03/13/21 0311  naloxone (NARCAN) injection 0.4 mg  Every 5 Minutes PRN       03/13/21 0312 03/13/21 0311  Intake & Output  Every Shift      03/13/21 0312 03/13/21 0310  nitroglycerin (NITROSTAT) SL tablet 0.4 mg  Every 5 Minutes PRN      03/13/21 0312 03/13/21 0310  sodium chloride 0.9 % flush 10 mL  As Needed      03/13/21 0312    Unscheduled  ECG 12 Lead  As Needed     Comments: Nurse to Release if Patient Expericences Acute Chest Pain or Dysrhythmias    03/13/21 0312    Unscheduled  Potassium  As Needed     Comments: For Ventricular Arrhythmias      03/13/21 0312    Unscheduled  Magnesium  As Needed     Comments: For Ventricular Arrhythmias      03/13/21 0312    Unscheduled  Troponin  As Needed     Comments: For Chest Pain      03/13/21 0312    Unscheduled  Digoxin Level  As Needed     Comments: For Atrial Arrhythmias      03/13/21 0312    Unscheduled  Blood Gas, Arterial -With Co-Ox Panel: Yes  As Needed     Comments: Per O2 PolicyNotify Physician      03/13/21 0312    Unscheduled  Magnesium  As Needed      03/13/21 0312    Unscheduled  Potassium  As Needed      03/13/21 0312    Unscheduled  Assist With Feeding Patient  As Needed      03/13/21 1404    Unscheduled  Pulse Checks  As Needed      03/16/21 0950    Unscheduled  Apply Ice To Affected Area  As Needed      03/16/21 0951    Unscheduled  Apply Ice to Affected Area  As Needed     Comments: Left elbow    03/16/21 0951    --  B Complex-C-Folic Acid (B-Plex) tablet  Daily      03/13/21 0439    --  citalopram (CeleXA) 10 MG tablet  Every Night at Bedtime      03/13/21 0439    --  lactulose (CHRONULAC) 10 GM/15ML solution  Every Night at Bedtime      03/13/21 0439    --  apixaban (ELIQUIS) 2.5 MG tablet tablet  2 Times Daily      03/13/21 0439    --  benztropine (COGENTIN) 1 MG tablet  2 Times Daily      03/13/21 0439    --  ferrous sulfate 325 (65 FE) MG tablet  2 times daily      03/13/21 0439    --  senna (senna) 8.6 MG tablet  2 Times Daily      03/13/21 0439    --  busPIRone (BUSPAR) 15 MG tablet  3 Times Daily       21    --  acetaminophen (TYLENOL) 650 MG 8 hr tablet  Every 8 Hours PRN      21    --  bisacodyl (Bisacodyl Laxative) 10 MG suppository  Daily PRN      21    --  Melatonin 10 MG tablet  Nightly PRN      21    --  magnesium hydroxide (MILK OF MAGNESIA) 400 MG/5ML suspension  Daily PRN      21    --  HYDROcodone-acetaminophen (NORCO) 5-325 MG per tablet  Every 6 Hours PRN      21    --  SCANNED - TELEMETRY        21 0000    --  SCANNED - TELEMETRY        21 0000    --  SCANNED - TELEMETRY        21 0000    --  SCANNED - TELEMETRY        21 0000    --  SCANNED - TELEMETRY        21 0000    --  SCANNED - TELEMETRY        21 0000    --  SCANNED - TELEMETRY        21 0000    --  SCANNED - TELEMETRY        21 0000    --  SCANNED - TELEMETRY        21 0000    --  SCANNED - TELEMETRY        21 0000    --  SCANNED - TELEMETRY        21 0000    --  SCANNED - TELEMETRY        21 0000                Operative/Procedure Notes (last 24 hours) (Notes from 21 through 21)    No notes of this type exist for this encounter.            Physician Progress Notes (last 24 hours) (Notes from 21 143 through 21 143)      Victorina Gusman APRN at 21 1248                     PROGRESS NOTE         Patient Identification:  Name:  Debra Resendiz  Age:  75 y.o.  Sex:  female  :  1945  MRN:  [id number]  Visit Number:  [id number]  Primary Care Provider:  Leigh Ann Fu MD         LOS: 5 days       ----------------------------------------------------------------------------------------------------------------------  Subjective       Chief Complaints:    No chief complaint on file.        Interval History:      Patient continues to be confused today.  Currently on 1 L per nasal cannula with no apparent distress.  Complains of left arm pain.  WBC normal.  CRP  slightly worsened at 3.83, likely related to postoperative healing, no evidence of worsening sepsis.    Review of Systems:    Constitutional: no fever, chills and night sweats. No appetite change or unexpected weight change. No fatigue.  Eyes: no eye drainage, itching or redness.  HEENT: no mouth sores, dysphagia or nose bleed.    Respiratory:  No shortness of breath, cough, or production of sputum.  Cardiovascular: no chest pain, no palpitations, no orthopnea.  Gastrointestinal: no vomiting or diarrhea. No abdominal pain, hematemesis or rectal bleeding.  Nausea.  Genitourinary: no dysuria or polyuria.  Hematologic/lymphatic: no lymph node abnormalities, no easy bruising or easy bleeding.  Musculoskeletal: Left arm pain.  Skin: No rash and no itching.  Neurological: no loss of consciousness, no seizure, no headache.  Behavioral/Psych: no depression or suicidal ideation.  Endocrine: no hot flashes.  Immunologic: negative.  ----------------------------------------------------------------------------------------------------------------------      Objective       Current [facility] Meds:  aspirin, 325 mg, Oral, Daily  b complex-C-folic acid, 1 capsule, Oral, Daily  benztropine, 1 mg, Oral, BID  busPIRone, 15 mg, Oral, TID  citalopram, 10 mg, Oral, Nightly  docusate sodium, 100 mg, Oral, BID  ertapenem, 1 g, Intravenous, Q24H  ferrous sulfate, 325 mg, Oral, BID  lactobacillus acidophilus, 1 capsule, Oral, Daily  lactulose, 20 g, Oral, Nightly  pantoprazole, 40 mg, Oral, Q AM  polyethylene glycol, 17 g, Oral, Daily  senna, 1 tablet, Oral, BID  sodium chloride, 10 mL, Intravenous, Q12H  sodium chloride, 10 mL, Intravenous, Q12H  vitamin D3, 5,000 Units, Oral, Daily         ----------------------------------------------------------------------------------------------------------------------    Vital Signs:  Temp:  [97.3 °F (36.3 °C)-98.1 °F (36.7 °C)] 97.5 °F (36.4 °C)  Heart Rate:  [79-93] 79  Resp:  [16-18] 18  BP:  ()/(48-63) 129/63  Mean Arterial Pressure (Non-Invasive) for the past 24 hrs (Last 3 readings):   Noninvasive MAP (mmHg)   03/18/21 1100 101   03/18/21 0704 77   03/18/21 0100 90     SpO2 Percentage    03/18/21 0100 03/18/21 0704 03/18/21 1100   SpO2: 92% 97% 97%     SpO2:  [92 %-97 %] 97 %  on  Flow (L/min):  [1] 1;   Device (Oxygen Therapy): nasal cannula    Body mass index is 16.41 kg/m².  Wt Readings from Last 3 Encounters:   03/18/21 46.1 kg (101 lb 11.2 oz)   04/13/18 56.3 kg (124 lb 3.2 oz)        Intake/Output Summary (Last 24 hours) at 3/18/2021 1248  Last data filed at 3/18/2021 0900  Gross per 24 hour   Intake 360 ml   Output 150 ml   Net 210 ml     Diet Soft Texture; Ground; Consistent Carbohydrate  ----------------------------------------------------------------------------------------------------------------------      Physical Exam:    Constitutional: Elderly  female is sitting up in bed in no apparent distress.  Mild tremor.  HENT:  Head: Normocephalic and atraumatic.  Mouth:  Moist mucous membranes.    Eyes:   No erythema or edema noted.  Neck:  Neck supple.  No JVD present.    Cardiovascular:  Normal rate, regular rhythm and normal heart sounds with no murmur. No edema.  Pulmonary/Chest:  Lungs are clear to auscultation.  No rhonchi, rales, or wheezes auscultated.  Abdominal:  Soft.  Bowel sounds are normal.  No distension and no tenderness.    Musculoskeletal:  No edema, no tenderness, and no deformity.  No swelling or redness of joints.  Left arm is in a surgical dressing.  Neurological:  Awake, alert, and oriented to person, place, and time. Mild tremor.  Skin:  Skin is warm and dry. No pallor.  No rash or lesion noted.    Psychiatric:  Calm and cooperative.  ----------------------------------------------------------------------------------------------------------------------  Results from last 7 days   Lab Units 03/14/21  7413   TROPONIN T ng/mL <0.010           Results from last  7 days   Lab Units 03/18/21 0229 03/17/21 0056 03/16/21  0144 03/14/21  1305   CRP mg/dL 3.83* 2.17* 1.27*  --    LACTATE mmol/L  --   --   --  1.4   WBC 10*3/mm3 8.30 8.67 8.60  --    HEMOGLOBIN g/dL 11.2* 11.1* 11.2*  --    HEMATOCRIT % 35.1 35.4 35.4  --    MCV fL 99.4* 100.6* 100.9*  --    MCHC g/dL 31.9 31.4* 31.6  --    PLATELETS 10*3/mm3 347 273 321  --      Results from last 7 days   Lab Units 03/18/21 0229 03/17/21 0056 03/16/21 0144 03/14/21  0109 03/13/21  0322   SODIUM mmol/L 135* 134* 134* 140 136   POTASSIUM mmol/L 4.2 4.4 4.4 3.7 4.3   MAGNESIUM mg/dL  --   --   --  1.7 1.9   CHLORIDE mmol/L 103 104 105 111* 104   CO2 mmol/L 23.9 21.7* 22.7 14.1* 23.9   BUN mg/dL 10 10 14 17 15   CREATININE mg/dL 0.43* 0.49* 0.47* 0.41* 0.53*   EGFR IF NONAFRICN AM mL/min/1.73 144 123 130 >150 113   CALCIUM mg/dL 9.3 8.8 9.2 7.8* 9.2   GLUCOSE mg/dL 98 96 88 74 94   ALBUMIN g/dL 2.90*  --  2.97*  --  3.22*   BILIRUBIN mg/dL 0.3  --  0.3  --  0.5   ALK PHOS U/L 104  --  96  --  100   AST (SGOT) U/L 25  --  21  --  18   ALT (SGPT) U/L 19  --  14  --  15   Estimated Creatinine Clearance: 44.9 mL/min (A) (by C-G formula based on SCr of 0.43 mg/dL (L)).  No results found for: AMMONIA    Glucose   Date/Time Value Ref Range Status   03/16/2021 0644 94 70 - 130 mg/dL Final     Lab Results   Component Value Date    HGBA1C 5.20 04/06/2018     Lab Results   Component Value Date    TSH 2.243 04/08/2018       Blood Culture   Date Value Ref Range Status   03/13/2021 No growth at 3 days  Preliminary   03/13/2021 No growth at 3 days  Preliminary     Urine Culture   Date Value Ref Range Status   03/13/2021 >100,000 CFU/mL Escherichia coli ESBL (A)  Final     Comment:     Consider infectious disease consult.  Susceptibility results may not correlate to clinical outcomes.     No results found for: WOUNDCX  No results found for: STOOLCX  No results found for: RESPCX  Pain Management Panel    There is no flowsheet data to display.            ----------------------------------------------------------------------------------------------------------------------  Imaging Results (Last 24 Hours)     ** No results found for the last 24 hours. **          ----------------------------------------------------------------------------------------------------------------------    Assessment/Plan       Assessment/Plan     ASSESSMENT:    1.  ESBL UTI    PLAN:    Patient continues to be confused today.  Currently on 1 L per nasal cannula with no apparent distress.  Complains of left arm pain.  WBC normal.  CRP slightly worsened at 3.83, likely related to postoperative healing, no evidence of worsening sepsis.    Urine culture finalized as greater than 100,000 colonies of E. coli ESBL.  Left elbow x-ray on 3/13/2021 showed a defect in the dorsal cortex of the proximal ulna.  Most likely there is a complete fracture through into the olecranon fossa but is not clearly visible on these images.  Chest x-ray on 3/13/2021 showed stable chronic lung changes.  No acute cardiopulmonary findings.  Blood cultures on 3/13/2021 are so far showing no growth at 2 days.  COVID-19 testing on 3/14/2021 was negative.     Per the surgeon's note, we are no longer concern for osteomyelitis.  Recommend to continue on Invanz pharmacy to dose through 3/22/2021 for UTI.  We will follow closely and adjust antibiotic therapy as appropriate.    Code Status:   Code Status and Medical Interventions:   Ordered at: 03/13/21 4737     Limited Support to NOT Include:    Intubation     Code Status:    No CPR     Medical Interventions (Level of Support Prior to Arrest):    Limited     SAMANTHA Joya  03/18/21  12:48 EDT      Electronically signed by Victorina Gusman APRN at 03/18/21 1249     Sang Epps MD at 03/17/21 1634          Assisted By: Jose ENGLISH    CC: Follow-up on ESBL UTI and elbow fracture    Interview History/HPI: Patient was awake, she states she is still having some pain in  "her left elbow, she states she is breathing \"okay\".  Nursing reports she has been having wet chucks however they are going to hook up an external catheter device.  She has been voiding however she denies any dysuria no abdominal pain.  She is now on room air.      Vitals:    03/17/21 1505   BP: 114/48   Pulse:    Resp:    Temp:    SpO2:          Intake/Output Summary (Last 24 hours) at 3/17/2021 1634  Last data filed at 3/17/2021 1316  Gross per 24 hour   Intake 460 ml   Output --   Net 460 ml       EXAM:   98, 93, 18, 94% saturated on room air.  She is awake alert, minimal tremor with her Parkinson's, no JVD pupils are equal she moves all her fingers well sensation intact in the left hand, the splint is intact, dry, extremities are without edema lungs have bilateral breath sounds are clear, heart is a regular rate and rhythm without murmur rub gallop.  Noted SCUDs are on      Tele: Sinus    LABS:   Lab Results (last 48 hours)     Procedure Component Value Units Date/Time    C-reactive Protein [[id number]]  (Abnormal) Collected: 03/17/21 0056    Specimen: Blood Updated: 03/17/21 0859     C-Reactive Protein 2.17 mg/dL     Blood Culture - Blood, Arm, Right [[id number]] Collected: 03/13/21 0502    Specimen: Blood from Arm, Right Updated: 03/17/21 0630     Blood Culture No growth at 4 days    Blood Culture - Blood, Hand, Left [[id number]] Collected: 03/13/21 0502    Specimen: Blood from Hand, Left Updated: 03/17/21 0630     Blood Culture No growth at 4 days    Basic Metabolic Panel [[id number]]  (Abnormal) Collected: 03/17/21 0056    Specimen: Blood Updated: 03/17/21 0139     Glucose 96 mg/dL      BUN 10 mg/dL      Creatinine 0.49 mg/dL      Sodium 134 mmol/L      Potassium 4.4 mmol/L      Comment: Slight hemolysis detected by analyzer. Results may be affected.        Chloride 104 mmol/L      CO2 21.7 mmol/L      Calcium 8.8 mg/dL      eGFR Non African Amer 123 mL/min/1.73      BUN/Creatinine Ratio 20.4     Anion Gap 8.3 " mmol/L     Narrative:      GFR Normal >60  Chronic Kidney Disease <60  Kidney Failure <15      CBC & Differential [[id number]]  (Abnormal) Collected: 03/17/21 0056    Specimen: Blood Updated: 03/17/21 0116    Narrative:      The following orders were created for panel order CBC & Differential.  Procedure                               Abnormality         Status                     ---------                               -----------         ------                     CBC Auto Differential[[id number]]        Abnormal            Final result                 Please view results for these tests on the individual orders.    CBC Auto Differential [[id number]]  (Abnormal) Collected: 03/17/21 0056    Specimen: Blood Updated: 03/17/21 0116     WBC 8.67 10*3/mm3      RBC 3.52 10*6/mm3      Hemoglobin 11.1 g/dL      Hematocrit 35.4 %      .6 fL      MCH 31.5 pg      MCHC 31.4 g/dL      RDW 13.6 %      RDW-SD 50.1 fl      MPV 9.3 fL      Platelets 273 10*3/mm3      Neutrophil % 72.9 %      Lymphocyte % 14.9 %      Monocyte % 9.2 %      Eosinophil % 1.7 %      Basophil % 0.8 %      Immature Grans % 0.5 %      Neutrophils, Absolute 6.32 10*3/mm3      Lymphocytes, Absolute 1.29 10*3/mm3      Monocytes, Absolute 0.80 10*3/mm3      Eosinophils, Absolute 0.15 10*3/mm3      Basophils, Absolute 0.07 10*3/mm3      Immature Grans, Absolute 0.04 10*3/mm3      nRBC 0.0 /100 WBC     POC Glucose Once [[id number]]  (Normal) Collected: 03/16/21 0644    Specimen: Blood Updated: 03/16/21 0657     Glucose 94 mg/dL     Comprehensive Metabolic Panel [[id number]]  (Abnormal) Collected: 03/16/21 0144    Specimen: Blood Updated: 03/16/21 0238     Glucose 88 mg/dL      BUN 14 mg/dL      Creatinine 0.47 mg/dL      Sodium 134 mmol/L      Potassium 4.4 mmol/L      Comment: Slight hemolysis detected by analyzer. Results may be affected.        Chloride 105 mmol/L      CO2 22.7 mmol/L      Calcium 9.2 mg/dL      Total Protein 6.1 g/dL      Albumin 2.97 g/dL       ALT (SGPT) 14 U/L      AST (SGOT) 21 U/L      Alkaline Phosphatase 96 U/L      Total Bilirubin 0.3 mg/dL      eGFR Non African Amer 130 mL/min/1.73      Globulin 3.1 gm/dL      A/G Ratio 0.9 g/dL      BUN/Creatinine Ratio 29.8     Anion Gap 6.3 mmol/L     Narrative:      GFR Normal >60  Chronic Kidney Disease <60  Kidney Failure <15      C-reactive Protein [[id number]]  (Abnormal) Collected: 03/16/21 0144    Specimen: Blood Updated: 03/16/21 0238     C-Reactive Protein 1.27 mg/dL     CBC & Differential [[id number]]  (Abnormal) Collected: 03/16/21 0144    Specimen: Blood Updated: 03/16/21 0216    Narrative:      The following orders were created for panel order CBC & Differential.  Procedure                               Abnormality         Status                     ---------                               -----------         ------                     CBC Auto Differential[[id number]]        Abnormal            Final result                 Please view results for these tests on the individual orders.    CBC Auto Differential [[id number]]  (Abnormal) Collected: 03/16/21 0144    Specimen: Blood Updated: 03/16/21 0216     WBC 8.60 10*3/mm3      RBC 3.51 10*6/mm3      Hemoglobin 11.2 g/dL      Hematocrit 35.4 %      .9 fL      MCH 31.9 pg      MCHC 31.6 g/dL      RDW 13.6 %      RDW-SD 50.2 fl      MPV 9.5 fL      Platelets 321 10*3/mm3      Neutrophil % 64.8 %      Lymphocyte % 22.3 %      Monocyte % 8.7 %      Eosinophil % 2.8 %      Basophil % 0.9 %      Immature Grans % 0.5 %      Neutrophils, Absolute 5.57 10*3/mm3      Lymphocytes, Absolute 1.92 10*3/mm3      Monocytes, Absolute 0.75 10*3/mm3      Eosinophils, Absolute 0.24 10*3/mm3      Basophils, Absolute 0.08 10*3/mm3      Immature Grans, Absolute 0.04 10*3/mm3      nRBC 0.0 /100 WBC           Radiology:    Imaging Results (Last 72 Hours)     Procedure Component Value Units Date/Time    FL Surgery Fluoro [[id number]] Collected: 03/16/21 1206     Updated:  03/16/21 1208    Narrative:      EXAM:    FL Fluoroscopy < 1 Hour     EXAM DATE:    3/16/2021 7:57 AM     CLINICAL HISTORY:    ORIF LT elbow; S42.402B-Unspecified fracture of lower end of left  humerus, initial encounter for open fracture     TECHNIQUE:    Fluoroscopic images performed in multiple projections.  Fluoroscopic  guidance was provided by a physician.     COMPARISON:    No relevant prior studies available.     FINDINGS:    BONES/JOINTS:  Proximal ulna plate-screw fixation hardware noted.       Impression:        As above.     This report was finalized on 3/16/2021 12:06 PM by Dr. Kirit Gibson MD.             Results for orders placed during the hospital encounter of 04/06/18    Adult Transthoracic Echo Complete W/ Cont if Necessary Per Protocol    Interpretation Summary  · Left ventricular systolic function is hyperdynamic (EF > 70%). Estimated EF appears to be in the range of 66 - 70%. Normal left ventricular cavity size and wall thickness noted.  · The study is technically suboptimal for diagnosis.      Assessment/Plan:   ESBL UTI, continue Invanz as recommended by infectious disease, CRP went up slightly but this is most likely normal postop finding, white count is normal, follow    Status post left elbow fracture repair, hemoglobin stable, follow.  This was not an open fracture    DVT prophylaxis, SCUDs    Noted patient was on Eliquis as an outpatient but this apparently was only for DVT prophylaxis around the time of her fracture therefore this is not been resumed to try to prevent any excessive bleeding.    Underlying Parkinson's disease, stable    Disposition SNF    Sang Epps MD       Electronically signed by Sang Epps MD at 03/17/21 1642       Consult Notes (last 24 hours) (Notes from 03/17/21 1439 through 03/18/21 1439)    No notes of this type exist for this encounter.            Physical Therapy Notes (last 24 hours) (Notes from 03/17/21 1440 through 03/18/21 1440)      Nicola  Brissa, PT at 21 1523  Version 1 of 1         Acute Care - Physical Therapy Treatment Note  JUDI [facility]     Patient Name: Debra Resendiz  : 1945  MRN: [id number]  Today's Date: 3/17/2021   Onset of Illness/Injury or Date of Surgery: 21 (admit date)       PT Assessment (last 12 hours)      PT Evaluation and Treatment     Row Name 21 1514          Physical Therapy Time and Intention    Subjective Information  complains of;pain  -CT     Document Type  therapy note (daily note)  -CT     Mode of Treatment  physical therapy  -CT     Patient Effort  adequate  -CT     Comment  Pt tolerated treatment fair with rest breaks provided as needed. Pt sat EOB today.   -CT     Row Name 21 1514          General Information    Patient Profile Reviewed  yes  -CT     Equipment Currently Used at Home  walker, rolling;wheelchair  -CT     Existing Precautions/Restrictions  fall  -CT     Limitations/Impairments  safety/cognitive  -CT     Row Name 21 1514          Cognition    Affect/Mental Status (Cognitive)  confused  -CT     Orientation Status (Cognition)  oriented to;person  -CT     Follows Commands (Cognition)  delayed response/completion;increased processing time needed  -CT     Row Name 21 1514          Mobility    Extremity Weight-bearing Status  left upper extremity  -CT     Left Upper Extremity (Weight-bearing Status)  non weight-bearing (NWB) elbow fracture awaiting surgery  -CT     Row Name 21 1514          Bed Mobility    Bed Mobility  rolling left;rolling right;scooting/bridging;supine-sit;sit-supine  -CT     Rolling Left [place] (Bed Mobility)  maximum assist (25% patient effort)  -CT     Rolling Right [place] (Bed Mobility)  maximum assist (25% patient effort)  -CT     Supine-Sit [place] (Bed Mobility)  maximum assist (25% patient effort)  -CT     Sit-Supine [place] (Bed Mobility)  maximum assist (25% patient effort)  -CT     Bed Mobility, Safety Issues   cognitive deficits limit understanding;decreased use of arms for pushing/pulling;decreased use of legs for bridging/pushing  -CT     Assistive Device (Bed Mobility)  bed rails;draw sheet  -CT     Comment (Bed Mobility)  Pt sat EOB for aprox 5 minutes  -CT     Row Name 03/17/21 1514          Transfers    Comment (Transfers)  declined   -CT     Row Name             Wound 03/16/21 1143 Left posterior elbow Incision    Wound - Properties Group Placement Date: 03/16/21  -CE Placement Time: 1143  -CE Side: Left  -CE Orientation: posterior  -CE Location: elbow  -CE Primary Wound Type: Incision  -CE    Retired Wound - Properties Group Date first assessed: 03/16/21  -CE Time first assessed: 1143  -CE Side: Left  -CE Location: elbow  -CE Primary Wound Type: Incision  -CE    Row Name 03/17/21 1514          Coping    Observed Emotional State  calm;cooperative  -CT     Row Name 03/17/21 1514          Plan of Care Review    Plan of Care Reviewed With  patient  -CT     Row Name 03/17/21 1514          Therapy Assessment/Plan (PT)    Rehab Potential (PT)  fair, will monitor progress closely  -CT     Criteria for Skilled Interventions Met (PT)  yes;skilled treatment is necessary  -CT       User Key  (r) = Recorded By, (t) = Taken By, (c) = Cosigned By    Initials Name Provider Type    CT Brissa Petersen, JAXON Physical Therapist    CE Jaison Layton, RN Registered Nurse        Physical Therapy Education                 Title: PT OT SLP Therapies (In Progress)     Topic: Physical Therapy (In Progress)     Point: Mobility training (In Progress)     Learning Progress Summary           Patient Acceptance, E,TB, NL by CT at 3/17/2021 1519    Acceptance, E,TB, NR by MP at 3/16/2021 0801    Acceptance, E, VU by MC at 3/16/2021 0142    Acceptance, E,TB, NR by CT at 3/15/2021 1513    Acceptance, E, VU by EB at 3/15/2021 0902    Acceptance, E,TB, VU,NR by  at 3/15/2021 0059   Family Acceptance, E,TB, NR by MP at 3/16/2021 0801                    Point: Home exercise program (In Progress)     Learning Progress Summary           Patient Acceptance, E,TB, NL by CT at 3/17/2021 1519    Acceptance, E,TB, NR by  at 3/16/2021 0801    Acceptance, E, VU by  at 3/16/2021 0142    Acceptance, E,TB, NR by CT at 3/15/2021 1513    Acceptance, E, VU by  at 3/15/2021 0902    Acceptance, E,TB, VU,NR by  at 3/15/2021 0059   Family Acceptance, E,TB, NR by  at 3/16/2021 0801                   Point: Body mechanics (In Progress)     Learning Progress Summary           Patient Acceptance, E,TB, NL by CT at 3/17/2021 1519    Acceptance, E,TB, NR by  at 3/16/2021 0801    Acceptance, E, VU by  at 3/16/2021 0142    Acceptance, E,TB, NR by CT at 3/15/2021 1513    Acceptance, E, VU by  at 3/15/2021 0902    Acceptance, E,TB, VU,NR by  at 3/15/2021 0059   Family Acceptance, E,TB, NR by  at 3/16/2021 0801                   Point: Precautions (In Progress)     Learning Progress Summary           Patient Acceptance, E,TB, NL by CT at 3/17/2021 1519    Acceptance, E,TB, NR by  at 3/16/2021 0801    Acceptance, E, VU by  at 3/16/2021 0142    Acceptance, E,TB, NR by CT at 3/15/2021 1513    Acceptance, E, VU by  at 3/15/2021 0902    Acceptance, E,TB, VU,NR by  at 3/15/2021 0059   Family Acceptance, E,TB, NR by  at 3/16/2021 0801                               User Key     Initials Effective Dates Name Provider Type Discipline    EB 06/16/16 -  Elizabeth Pereyra, RN Registered Nurse Nurse     06/16/16 -  Eli Mittal, RN Registered Nurse Nurse    CT 04/03/18 -  Brissa Petersen, JAXON Physical Therapist PT     09/06/18 -  Collett, Morgan, RN Registered Nurse Nurse              PT Recommendation and Plan  Anticipated Discharge Disposition (PT): skilled nursing facility  Planned Therapy Interventions (PT): balance training, bed mobility training, gait training, home exercise program, manual therapy techniques, motor coordination training, neuromuscular  re-education, patient/family education, postural re-education, strengthening, transfer training  Therapy Frequency (PT): 2 times/wk (2-5 times/wk )  Plan of Care Reviewed With: patient       Time Calculation:   PT Charges     Row Name 21 1519             Time Calculation    PT Received On  21  -CT      PT Goal Re-Cert Due Date  21  -CT         Time Calculation- PT    Total Timed Code Minutes- PT  29 minute(s)  -CT        User Key  (r) = Recorded By, (t) = Taken By, (c) = Cosigned By    Initials Name Provider Type    CT Brissa Petersen, PT Physical Therapist        Therapy Charges for Today     Code Description Service Date Service Provider Modifiers Qty    20283545579 HC PT THERAPEUTIC ACT EA 15 MIN 3/17/2021 Brissa Petersen PT GP 2               Brissa Petersen PT  3/17/2021      Electronically signed by Brissa Petersen PT at 21 1523          Occupational Therapy Notes (last 24 hours) (Notes from 21 1440 through 21 1440)      Carmen Terry, OT at 21 1542          Patient Name: Debra Resendiz  : 1945    MRN: [id number]                              Today's Date: 3/17/2021       Admit Date: 3/13/2021    Visit Dx:     ICD-10-CM ICD-9-CM   1. Elbow fracture, left, open, initial encounter  S42.402B 812.50     Patient Active Problem List   Diagnosis   • Closed displaced comminuted fracture of shaft of left humerus   • Elbow fracture, left, open, initial encounter   • Former smoker   • Nursing home resident     Past Medical History:   Diagnosis Date   • Arthritis    • History of transfusion    • Parkinson disease (CMS/[facility])      Past Surgical History:   Procedure Laterality Date   • ELBOW OPEN REDUCTION INTERNAL FIXATION Left 3/16/2021    Procedure: LEFT ELBOW OPEN REDUCTION INTERNAL FIXATION WITH PLATE AND SCREWS;  Surgeon: Williams Mccollum DO;  Location: [facility];  Service: Orthopedics;  Laterality: Left;   • HIP SURGERY     • HYSTERECTOMY     • ORIF HUMERUS FRACTURE  Left 4/8/2018    Procedure: LEFT HUMERUS PROXIMAL OPEN REDUCTION INTERNAL FIXATION;  Surgeon: Dani Aburto MD;  Location: [facility];  Service: Orthopedics   • TUBAL ABDOMINAL LIGATION       General Information     Row Name 03/17/21 1530          OT Time and Intention    Document Type  evaluation  -LM     Mode of Treatment  occupational therapy  -LM     Row Name 03/17/21 1530          General Information    Patient Profile Reviewed  yes  -LM     Prior Level of Function  max assist:;all household mobility;transfer;ADL's  -LM     Existing Precautions/Restrictions  fall;weight bearing;other (see comments) NWB LUE  -LM     Barriers to Rehab  medically complex;previous functional deficit;cognitive status  -LM     Row Name 03/17/21 1530          Living Environment    Lives With  facility resident  -LM     Row Name 03/17/21 1530          Cognition    Orientation Status (Cognition)  disoriented to;place;situation;time  -LM     Row Name 03/17/21 1530          Safety Issues, Functional Mobility    Safety Issues Affecting Function (Mobility)  awareness of need for assistance;ability to follow commands;problem-solving;safety precaution awareness  -LM     Impairments Affecting Function (Mobility)  endurance/activity tolerance;coordination;range of motion (ROM);strength  -LM     Cognitive Impairments, Mobility Safety/Performance  attention;awareness, need for assistance;impulsivity;insight into deficits/self-awareness;judgment;problem-solving/reasoning;safety precaution awareness;safety precaution follow-through;sequencing abilities  -LM       User Key  (r) = Recorded By, (t) = Taken By, (c) = Cosigned By    Initials Name Provider Type    LM Carmen Terry OT Occupational Therapist          Mobility/ADL's     Row Name 03/17/21 1533          Activities of Daily Living    BADL Assessment/Intervention  bathing;upper body dressing;lower body dressing;grooming;feeding;toileting  -LM     Row Name 03/17/21 1533          Mobility     Extremity Weight-bearing Status  left upper extremity  -LM     Left Upper Extremity (Weight-bearing Status)  non weight-bearing (NWB)  -LM     Row Name 03/17/21 1533          Bathing Assessment/Intervention    [place] Level (Bathing)  dependent (less than 25% patient effort)  -LM     Row Name 03/17/21 1533          Upper Body Dressing Assessment/Training    [place] Level (Upper Body Dressing)  dependent (less than 25% patient effort)  -LM     Row Name 03/17/21 1533          Lower Body Dressing Assessment/Training    [place] Level (Lower Body Dressing)  dependent (less than 25% patient effort)  -LM     Row Name 03/17/21 1533          Grooming Assessment/Training    [place] Level (Grooming)  maximum assist (25% patient effort)  -LM     Row Name 03/17/21 1533          Self-Feeding Assessment/Training    [place] Level (Feeding)  set up  -LM     Row Name 03/17/21 1533          Toileting Assessment/Training    [place] Level (Toileting)  dependent (less than 25% patient effort)  -LM       User Key  (r) = Recorded By, (t) = Taken By, (c) = Cosigned By    Initials Name Provider Type    LM Carmen Terry, OT Occupational Therapist        Obj/Interventions     Row Name 03/17/21 1535          Sensory Assessment (Somatosensory)    Sensory Assessment (Somatosensory)  unable/difficult to assess;other (see comments) cognition and cast  -LM     Row Name 03/17/21 1535          Range of Motion Comprehensive    General Range of Motion  other (see comments)  -LM     Comment, General Range of Motion  decreased lue arom. casted  -LM     [facility] Name 03/17/21 1535          Strength Comprehensive (MMT)    General Manual Muscle Testing (MMT) Assessment  other (see comments)  -LM     Comment, General Manual Muscle Testing (MMT) Assessment  deferred LUE  -LM       User Key  (r) = Recorded By, (t) = Taken By, (c) = Cosigned By    Initials Name Provider Type    Carmen Fuentes, OT Occupational Therapist         Goals/Plan     [facility] Name 03/17/21 1537          Transfer Goal 1 (OT)    Activity/Assistive Device (Transfer Goal 1, OT)  commode, 3-in-1  -LM     [place] Level/Cues Needed (Transfer Goal 1, OT)  minimum assist (75% or more patient effort);moderate assist (50-74% patient effort)  -LM     Time Frame (Transfer Goal 1, OT)  by discharge  -[facility] Name 03/17/21 1537          Grooming Goal 1 (OT)    [place] (Grooming Goal 1, OT)  minimum assist (75% or more patient effort)  -LM     Time Frame (Grooming Goal 1, OT)  by discharge  -[facility] Name 03/17/21 1537          Therapy Assessment/Plan (OT)    Planned Therapy Interventions (OT)  activity tolerance training;BADL retraining;patient/caregiver education/training;ROM/therapeutic exercise;strengthening exercise;transfer/mobility retraining;adaptive equipment training  -LM       User Key  (r) = Recorded By, (t) = Taken By, (c) = Cosigned By    Initials Name Provider Type    Carmen Fuentes, OT Occupational Therapist        Clinical Impression     [facility] Name 03/17/21 1536          Plan of Care Review    Plan of Care Reviewed With  patient  -[facility] Name 03/17/21 1536          Therapy Assessment/Plan (OT)    Rehab Potential (OT)  fair, will monitor progress closely  -LM     Criteria for Skilled Therapeutic Interventions Met (OT)  yes;meets criteria;skilled treatment is necessary  -[facility] Name 03/17/21 1536          Therapy Plan Review/Discharge Plan (OT)    Anticipated Discharge Disposition (OT)  skilled nursing facility  -[facility] Name 03/17/21 1536          Positioning and Restraints    Post Treatment Position  bed  -LM     In Bed  call light within reach;encouraged to call for assist  -LM       User Key  (r) = Recorded By, (t) = Taken By, (c) = Cosigned By    Initials Name Provider Type    Carmen Fuentes, OT Occupational Therapist        Outcome Measures    No documentation.         OT Recommendation and Plan  Planned Therapy Interventions  (OT): activity tolerance training, BADL retraining, patient/caregiver education/training, ROM/therapeutic exercise, strengthening exercise, transfer/mobility retraining, adaptive equipment training  Plan of Care Review  Plan of Care Reviewed With: patient     Time Calculation:     Therapy Charges for Today     Code Description Service Date Service Provider Modifiers Qty    [id number]  OT EVAL MOD COMPLEXITY 4 3/17/2021 Carmen Terry OT GO 1               Carmen Terry OT  3/17/2021    Electronically signed by Carmen Terry OT at 03/17/21 1543       Speech Language Pathology Notes (last 24 hours) (Notes from 03/17/21 1440 through 03/18/21 1440)    No notes exist for this encounter.         ADL Documentation (last day)     Date/Time Transferring Toileting Bathing Dressing Eating Communication Swallowing    03/17/21 1920  4 - completely dependent  2 - assistive person  2 - assistive person  2 - assistive person  2 - assistive person  0 - understands/communicates without difficulty  0 - swallows foods/liquids without difficulty

## 2021-03-18 NOTE — PROGRESS NOTES
Discharge Planning Assessment   [place]     Patient Name: Debra Resendiz  MRN: [id number]  Today's Date: 3/18/2021    Admit Date: 3/13/2021      Discharge Plan     Row Name 03/18/21 1453       Plan    Plan  SS spoke with Loree at [facility] on this date regarding possible discharge back in am on IV Invanz.  SS faxed updated information to [phone] to [place] and notified Loree.  Per Loree [facility] or [facility] rather than a peripheral IV.  SS notified Physician.  SS will follow.            JOSI Levine

## 2021-03-18 NOTE — PROGRESS NOTES
Nutrition Services    Patient Name:  Debra Resendiz  YOB: 1945  MRN: [id number]  Admit Date:  3/13/2021    Noted minimal PO intake of 35% average over 5 meals, and 50% of 1 snack, per documentation.  Will liberalize diet to help increase overall kcal/protein intake.    Will continue to follow.  Electronically signed by:  Merari Eastman  03/18/21 13:57 EDT

## 2021-03-18 NOTE — PROGRESS NOTES
Assisted By: Pooja ENGLISH    CC: Follow-up on elbow fracture.    Interview History/HPI: Patient was resting on arrival but easily awakened.  She has not eaten much today and states she is not hungry.  She is still having some pain in her left arm around her elbow, she cannot really characterize this or intensify this.  No fever overnight, cast skin to remain in place until she is seen in orthopedic office.      Vitals:    03/18/21 1447   BP:    Pulse:    Resp:    Temp:    SpO2: 98%         Intake/Output Summary (Last 24 hours) at 3/18/2021 1504  Last data filed at 3/18/2021 1400  Gross per 24 hour   Intake 360 ml   Output 150 ml   Net 210 ml       EXAM: 119/59, pulse is 83, respiratory rate 18 temperature 97.7, she was on 1 L when I came in the room but we turned her oxygen off, she is 99% saturated on room air.  She appears no distress, underweight by BMI, she moves all extremities on command,  strength on the right is good, she moves her fingers on the left but this is the arm in a cast.  Lungs are clear, heart regular rate and rhythm without murmur, abdomen soft, benign no extremity edema    Tele: Sinus rhythm    LABS:   Lab Results (last 48 hours)     Procedure Component Value Units Date/Time    Blood Culture - Blood, Arm, Right [[id number]] Collected: 03/13/21 0502    Specimen: Blood from Arm, Right Updated: 03/18/21 0630     Blood Culture No growth at 5 days    Blood Culture - Blood, Hand, Left [[id number]] Collected: 03/13/21 0502    Specimen: Blood from Hand, Left Updated: 03/18/21 0630     Blood Culture No growth at 5 days    Comprehensive Metabolic Panel [[id number]]  (Abnormal) Collected: 03/18/21 0229    Specimen: Blood Updated: 03/18/21 0313     Glucose 98 mg/dL      BUN 10 mg/dL      Creatinine 0.43 mg/dL      Sodium 135 mmol/L      Potassium 4.2 mmol/L      Chloride 103 mmol/L      CO2 23.9 mmol/L      Calcium 9.3 mg/dL      Total Protein 6.0 g/dL      Albumin 2.90 g/dL      ALT (SGPT) 19 U/L      AST  (SGOT) 25 U/L      Alkaline Phosphatase 104 U/L      Total Bilirubin 0.3 mg/dL      eGFR Non African Amer 144 mL/min/1.73      Globulin 3.1 gm/dL      A/G Ratio 0.9 g/dL      BUN/Creatinine Ratio 23.3     Anion Gap 8.1 mmol/L     Narrative:      GFR Normal >60  Chronic Kidney Disease <60  Kidney Failure <15      C-reactive Protein [[id number]]  (Abnormal) Collected: 03/18/21 0229    Specimen: Blood Updated: 03/18/21 0313     C-Reactive Protein 3.83 mg/dL     CBC & Differential [[id number]]  (Abnormal) Collected: 03/18/21 0229    Specimen: Blood Updated: 03/18/21 0244    Narrative:      The following orders were created for panel order CBC & Differential.  Procedure                               Abnormality         Status                     ---------                               -----------         ------                     CBC Auto Differential[[id number]]        Abnormal            Final result                 Please view results for these tests on the individual orders.    CBC Auto Differential [[id number]]  (Abnormal) Collected: 03/18/21 0229    Specimen: Blood Updated: 03/18/21 0244     WBC 8.30 10*3/mm3      RBC 3.53 10*6/mm3      Hemoglobin 11.2 g/dL      Hematocrit 35.1 %      MCV 99.4 fL      MCH 31.7 pg      MCHC 31.9 g/dL      RDW 13.6 %      RDW-SD 49.4 fl      MPV 9.2 fL      Platelets 347 10*3/mm3      Neutrophil % 64.7 %      Lymphocyte % 21.1 %      Monocyte % 10.2 %      Eosinophil % 2.7 %      Basophil % 0.6 %      Immature Grans % 0.7 %      Neutrophils, Absolute 5.37 10*3/mm3      Lymphocytes, Absolute 1.75 10*3/mm3      Monocytes, Absolute 0.85 10*3/mm3      Eosinophils, Absolute 0.22 10*3/mm3      Basophils, Absolute 0.05 10*3/mm3      Immature Grans, Absolute 0.06 10*3/mm3      nRBC 0.0 /100 WBC     C-reactive Protein [[id number]]  (Abnormal) Collected: 03/17/21 0056    Specimen: Blood Updated: 03/17/21 0859     C-Reactive Protein 2.17 mg/dL     Basic Metabolic Panel [[id number]]  (Abnormal)  Collected: 03/17/21 0056    Specimen: Blood Updated: 03/17/21 0139     Glucose 96 mg/dL      BUN 10 mg/dL      Creatinine 0.49 mg/dL      Sodium 134 mmol/L      Potassium 4.4 mmol/L      Comment: Slight hemolysis detected by analyzer. Results may be affected.        Chloride 104 mmol/L      CO2 21.7 mmol/L      Calcium 8.8 mg/dL      eGFR Non African Amer 123 mL/min/1.73      BUN/Creatinine Ratio 20.4     Anion Gap 8.3 mmol/L     Narrative:      GFR Normal >60  Chronic Kidney Disease <60  Kidney Failure <15      CBC & Differential [[id number]]  (Abnormal) Collected: 03/17/21 0056    Specimen: Blood Updated: 03/17/21 0116    Narrative:      The following orders were created for panel order CBC & Differential.  Procedure                               Abnormality         Status                     ---------                               -----------         ------                     CBC Auto Differential[[id number]]        Abnormal            Final result                 Please view results for these tests on the individual orders.    CBC Auto Differential [[id number]]  (Abnormal) Collected: 03/17/21 0056    Specimen: Blood Updated: 03/17/21 0116     WBC 8.67 10*3/mm3      RBC 3.52 10*6/mm3      Hemoglobin 11.1 g/dL      Hematocrit 35.4 %      .6 fL      MCH 31.5 pg      MCHC 31.4 g/dL      RDW 13.6 %      RDW-SD 50.1 fl      MPV 9.3 fL      Platelets 273 10*3/mm3      Neutrophil % 72.9 %      Lymphocyte % 14.9 %      Monocyte % 9.2 %      Eosinophil % 1.7 %      Basophil % 0.8 %      Immature Grans % 0.5 %      Neutrophils, Absolute 6.32 10*3/mm3      Lymphocytes, Absolute 1.29 10*3/mm3      Monocytes, Absolute 0.80 10*3/mm3      Eosinophils, Absolute 0.15 10*3/mm3      Basophils, Absolute 0.07 10*3/mm3      Immature Grans, Absolute 0.04 10*3/mm3      nRBC 0.0 /100 WBC           Radiology:    Imaging Results (Last 72 Hours)     Procedure Component Value Units Date/Time    FL Surgery Fluoro [[id number]] Collected:  03/16/21 1206     Updated: 03/16/21 1208    Narrative:      EXAM:    FL Fluoroscopy < 1 Hour     EXAM DATE:    3/16/2021 7:57 AM     CLINICAL HISTORY:    ORIF LT elbow; S42.402B-Unspecified fracture of lower end of left  humerus, initial encounter for open fracture     TECHNIQUE:    Fluoroscopic images performed in multiple projections.  Fluoroscopic  guidance was provided by a physician.     COMPARISON:    No relevant prior studies available.     FINDINGS:    BONES/JOINTS:  Proximal ulna plate-screw fixation hardware noted.       Impression:        As above.     This report was finalized on 3/16/2021 12:06 PM by Dr. Kirit Gibson MD.             Results for orders placed during the hospital encounter of 04/06/18    Adult Transthoracic Echo Complete W/ Cont if Necessary Per Protocol    Interpretation Summary  · Left ventricular systolic function is hyperdynamic (EF > 70%). Estimated EF appears to be in the range of 66 - 70%. Normal left ventricular cavity size and wall thickness noted.  · The study is technically suboptimal for diagnosis.      Assessment/Plan:   ESBL UTI, continue Invanz as recommended by infectious disease, CRP again went up  but this is most likely normal postop reaction, white count is normal, noted patient will need Invanz through March 22, discussed with , will have a midline placed after discussion with nursing home and patient can complete her Invanz course there.     Status post left elbow fracture repair, hemoglobin stable, follow.  This was not an open fracture     DVT prophylaxis, SCUDs     Underlying Parkinson's disease, stable, she is however underweight, I have encouraged her on her diet, minimal tremor noted at this time.    Mild hyponatremia, improved over yesterday follow-up tomorrow    In her suction canister from her pure wick catheter there was minimal but nursing reports that she has been having large urine output on chucks the pure wick is really not  working.    Disposition [facility]    Sang Epps MD

## 2021-03-18 NOTE — PROGRESS NOTES
PROGRESS NOTE         Patient Identification:  Name:  Debra Resendiz  Age:  75 y.o.  Sex:  female  :  1945  MRN:  [id number]  Visit Number:  [id number]  Primary Care Provider:  Leigh Ann Fu MD         LOS: 5 days       ----------------------------------------------------------------------------------------------------------------------  Subjective       Chief Complaints:    No chief complaint on file.        Interval History:      Patient continues to be confused today.  Currently on 1 L per nasal cannula with no apparent distress.  Complains of left arm pain.  WBC normal.  CRP slightly worsened at 3.83, likely related to postoperative healing, no evidence of worsening sepsis.    Review of Systems:    Constitutional: no fever, chills and night sweats. No appetite change or unexpected weight change. No fatigue.  Eyes: no eye drainage, itching or redness.  HEENT: no mouth sores, dysphagia or nose bleed.    Respiratory:  No shortness of breath, cough, or production of sputum.  Cardiovascular: no chest pain, no palpitations, no orthopnea.  Gastrointestinal: no vomiting or diarrhea. No abdominal pain, hematemesis or rectal bleeding.  Nausea.  Genitourinary: no dysuria or polyuria.  Hematologic/lymphatic: no lymph node abnormalities, no easy bruising or easy bleeding.  Musculoskeletal: Left arm pain.  Skin: No rash and no itching.  Neurological: no loss of consciousness, no seizure, no headache.  Behavioral/Psych: no depression or suicidal ideation.  Endocrine: no hot flashes.  Immunologic: negative.  ----------------------------------------------------------------------------------------------------------------------      Objective       [facility] Meds:  aspirin, 325 mg, Oral, Daily  b complex-C-folic acid, 1 capsule, Oral, Daily  benztropine, 1 mg, Oral, BID  busPIRone, 15 mg, Oral, TID  citalopram, 10 mg, Oral, Nightly  docusate sodium, 100 mg, Oral, BID  ertapenem, 1 g, Intravenous,  Q24H  ferrous sulfate, 325 mg, Oral, BID  lactobacillus acidophilus, 1 capsule, Oral, Daily  lactulose, 20 g, Oral, Nightly  pantoprazole, 40 mg, Oral, Q AM  polyethylene glycol, 17 g, Oral, Daily  senna, 1 tablet, Oral, BID  sodium chloride, 10 mL, Intravenous, Q12H  sodium chloride, 10 mL, Intravenous, Q12H  vitamin D3, 5,000 Units, Oral, Daily         ----------------------------------------------------------------------------------------------------------------------    Vital Signs:  Temp:  [97.3 °F (36.3 °C)-98.1 °F (36.7 °C)] 97.5 °F (36.4 °C)  Heart Rate:  [79-93] 79  Resp:  [16-18] 18  BP: ()/(48-63) 129/63  Mean Arterial Pressure (Non-Invasive) for the past 24 hrs (Last 3 readings):   Noninvasive MAP (mmHg)   03/18/21 1100 101   03/18/21 0704 77   03/18/21 0100 90     SpO2 Percentage    03/18/21 0100 03/18/21 0704 03/18/21 1100   SpO2: 92% 97% 97%     SpO2:  [92 %-97 %] 97 %  on  Flow (L/min):  [1] 1;   Device (Oxygen Therapy): nasal cannula    Body mass index is 16.41 kg/m².  Wt Readings from Last 3 Encounters:   03/18/21 46.1 kg (101 lb 11.2 oz)   04/13/18 56.3 kg (124 lb 3.2 oz)        Intake/Output Summary (Last 24 hours) at 3/18/2021 1248  Last data filed at 3/18/2021 0900  Gross per 24 hour   Intake 360 ml   Output 150 ml   Net 210 ml     Diet Soft Texture; Ground; Consistent Carbohydrate  ----------------------------------------------------------------------------------------------------------------------      Physical Exam:    Constitutional: Elderly  female is sitting up in bed in no apparent distress.  Mild tremor.  HENT:  Head: Normocephalic and atraumatic.  Mouth:  Moist mucous membranes.    Eyes:   No erythema or edema noted.  Neck:  Neck supple.  No JVD present.    Cardiovascular:  Normal rate, regular rhythm and normal heart sounds with no murmur. No edema.  Pulmonary/Chest:  Lungs are clear to auscultation.  No rhonchi, rales, or wheezes auscultated.  Abdominal:  Soft.  Bowel  sounds are normal.  No distension and no tenderness.    Musculoskeletal:  No edema, no tenderness, and no deformity.  No swelling or redness of joints.  Left arm is in a surgical dressing.  Neurological:  Awake, alert, and oriented to person, place, and time. Mild tremor.  Skin:  Skin is warm and dry. No pallor.  No rash or lesion noted.    Psychiatric:  Calm and cooperative.  ----------------------------------------------------------------------------------------------------------------------  Results from last 7 days   Lab Units 03/14/21  2154   TROPONIN T ng/mL <0.010           Results from last 7 days   Lab Units 03/18/21 0229 03/17/21 0056 03/16/21  0144 03/14/21  1305   CRP mg/dL 3.83* 2.17* 1.27*  --    LACTATE mmol/L  --   --   --  1.4   WBC 10*3/mm3 8.30 8.67 8.60  --    HEMOGLOBIN g/dL 11.2* 11.1* 11.2*  --    HEMATOCRIT % 35.1 35.4 35.4  --    MCV fL 99.4* 100.6* 100.9*  --    MCHC g/dL 31.9 31.4* 31.6  --    PLATELETS 10*3/mm3 347 273 321  --      Results from last 7 days   Lab Units 03/18/21 0229 03/17/21 0056 03/16/21  0144 03/14/21  0109 03/13/21  0322   SODIUM mmol/L 135* 134* 134* 140 136   POTASSIUM mmol/L 4.2 4.4 4.4 3.7 4.3   MAGNESIUM mg/dL  --   --   --  1.7 1.9   CHLORIDE mmol/L 103 104 105 111* 104   CO2 mmol/L 23.9 21.7* 22.7 14.1* 23.9   BUN mg/dL 10 10 14 17 15   CREATININE mg/dL 0.43* 0.49* 0.47* 0.41* 0.53*   EGFR IF NONAFRICN AM mL/min/1.73 144 123 130 >150 113   CALCIUM mg/dL 9.3 8.8 9.2 7.8* 9.2   GLUCOSE mg/dL 98 96 88 74 94   ALBUMIN g/dL 2.90*  --  2.97*  --  3.22*   BILIRUBIN mg/dL 0.3  --  0.3  --  0.5   ALK PHOS U/L 104  --  96  --  100   AST (SGOT) U/L 25  --  21  --  18   ALT (SGPT) U/L 19  --  14  --  15   Estimated Creatinine Clearance: 44.9 mL/min (A) (by C-G formula based on SCr of 0.43 mg/dL (L)).  No results found for: AMMONIA    Glucose   Date/Time Value Ref Range Status   03/16/2021 0644 94 70 - 130 mg/dL Final     Lab Results   Component Value Date    HGBA1C 5.20  04/06/2018     Lab Results   Component Value Date    TSH 2.243 04/08/2018       Blood Culture   Date Value Ref Range Status   03/13/2021 No growth at 3 days  Preliminary   03/13/2021 No growth at 3 days  Preliminary     Urine Culture   Date Value Ref Range Status   03/13/2021 >100,000 CFU/mL Escherichia coli ESBL (A)  Final     Comment:     Consider infectious disease consult.  Susceptibility results may not correlate to clinical outcomes.     No results found for: WOUNDCX  No results found for: STOOLCX  No results found for: RESPCX  Pain Management Panel    There is no flowsheet data to display.           ----------------------------------------------------------------------------------------------------------------------  Imaging Results (Last 24 Hours)       ** No results found for the last 24 hours. **            ----------------------------------------------------------------------------------------------------------------------    Assessment/Plan       Assessment/Plan     ASSESSMENT:    1.  ESBL UTI    PLAN:    Patient continues to be confused today.  Currently on 1 L per nasal cannula with no apparent distress.  Complains of left arm pain.  WBC normal.  CRP slightly worsened at 3.83, likely related to postoperative healing, no evidence of worsening sepsis.    Urine culture finalized as greater than 100,000 colonies of E. coli ESBL.  Left elbow x-ray on 3/13/2021 showed a defect in the dorsal cortex of the proximal ulna.  Most likely there is a complete fracture through into the olecranon fossa but is not clearly visible on these images.  Chest x-ray on 3/13/2021 showed stable chronic lung changes.  No acute cardiopulmonary findings.  Blood cultures on 3/13/2021 are so far showing no growth at 2 days.  COVID-19 testing on 3/14/2021 was negative.     Per the surgeon's note, we are no longer concern for osteomyelitis.  Recommend to continue on [facility] pharmacy to dose through 3/22/2021 for UTI.  We will follow  closely and adjust antibiotic therapy as appropriate.    Code Status:   Code Status and Medical Interventions:   Ordered at: 03/13/21 0434     Limited Support to NOT Include:    Intubation     Code Status:    No CPR     Medical Interventions (Level of Support Prior to Arrest):    Limited     SAMANTHA Joya  03/18/21  12:48 EDT

## 2021-03-18 NOTE — PLAN OF CARE
Goal Outcome Evaluation:     Progress: improving     Patient rested in between care. Patient intake remains minimal but did drink a cup of coffee with breakfast and ate some diced fruit in the afternoon.Patient remained on room air during shift with no complications. Continued antibiotic therapy. Will continue to monitor and follow plan of care.

## 2021-03-19 VITALS
SYSTOLIC BLOOD PRESSURE: 102 MMHG | HEART RATE: 64 BPM | BODY MASS INDEX: 15.75 KG/M2 | WEIGHT: 98 LBS | DIASTOLIC BLOOD PRESSURE: 50 MMHG | TEMPERATURE: 98.2 F | HEIGHT: 66 IN | OXYGEN SATURATION: 98 % | RESPIRATION RATE: 20 BRPM

## 2021-03-19 LAB
ANION GAP SERPL CALCULATED.3IONS-SCNC: 6.3 MMOL/L (ref 5–15)
BASOPHILS # BLD AUTO: 0.07 10*3/MM3 (ref 0–0.2)
BASOPHILS NFR BLD AUTO: 0.9 % (ref 0–1.5)
BUN SERPL-MCNC: 14 MG/DL (ref 8–23)
BUN/CREAT SERPL: 26.4 (ref 7–25)
CALCIUM SPEC-SCNC: 9.4 MG/DL (ref 8.6–10.5)
CHLORIDE SERPL-SCNC: 103 MMOL/L (ref 98–107)
CO2 SERPL-SCNC: 25.7 MMOL/L (ref 22–29)
CREAT SERPL-MCNC: 0.53 MG/DL (ref 0.57–1)
CRP SERPL-MCNC: 2.09 MG/DL (ref 0–0.5)
DEPRECATED RDW RBC AUTO: 50.6 FL (ref 37–54)
EOSINOPHIL # BLD AUTO: 0.31 10*3/MM3 (ref 0–0.4)
EOSINOPHIL NFR BLD AUTO: 4.2 % (ref 0.3–6.2)
ERYTHROCYTE [DISTWIDTH] IN BLOOD BY AUTOMATED COUNT: 13.6 % (ref 12.3–15.4)
GFR SERPL CREATININE-BSD FRML MDRD: 113 ML/MIN/1.73
GLUCOSE SERPL-MCNC: 91 MG/DL (ref 65–99)
HCT VFR BLD AUTO: 36 % (ref 34–46.6)
HGB BLD-MCNC: 11.1 G/DL (ref 12–15.9)
IMM GRANULOCYTES # BLD AUTO: 0.05 10*3/MM3 (ref 0–0.05)
IMM GRANULOCYTES NFR BLD AUTO: 0.7 % (ref 0–0.5)
LYMPHOCYTES # BLD AUTO: 1.83 10*3/MM3 (ref 0.7–3.1)
LYMPHOCYTES NFR BLD AUTO: 24.7 % (ref 19.6–45.3)
MCH RBC QN AUTO: 31.3 PG (ref 26.6–33)
MCHC RBC AUTO-ENTMCNC: 30.8 G/DL (ref 31.5–35.7)
MCV RBC AUTO: 101.4 FL (ref 79–97)
MONOCYTES # BLD AUTO: 0.72 10*3/MM3 (ref 0.1–0.9)
MONOCYTES NFR BLD AUTO: 9.7 % (ref 5–12)
NEUTROPHILS NFR BLD AUTO: 4.44 10*3/MM3 (ref 1.7–7)
NEUTROPHILS NFR BLD AUTO: 59.8 % (ref 42.7–76)
NRBC BLD AUTO-RTO: 0 /100 WBC (ref 0–0.2)
PLATELET # BLD AUTO: 379 10*3/MM3 (ref 140–450)
PMV BLD AUTO: 9.4 FL (ref 6–12)
POTASSIUM SERPL-SCNC: 4.2 MMOL/L (ref 3.5–5.2)
RBC # BLD AUTO: 3.55 10*6/MM3 (ref 3.77–5.28)
SODIUM SERPL-SCNC: 135 MMOL/L (ref 136–145)
WBC # BLD AUTO: 7.42 10*3/MM3 (ref 3.4–10.8)

## 2021-03-19 PROCEDURE — 85025 COMPLETE CBC W/AUTO DIFF WBC: CPT | Performed by: INTERNAL MEDICINE

## 2021-03-19 PROCEDURE — 99239 HOSP IP/OBS DSCHRG MGMT >30: CPT | Performed by: INTERNAL MEDICINE

## 2021-03-19 PROCEDURE — 80048 BASIC METABOLIC PNL TOTAL CA: CPT | Performed by: INTERNAL MEDICINE

## 2021-03-19 PROCEDURE — 25010000002 ERTAPENEM PER 500 MG: Performed by: INTERNAL MEDICINE

## 2021-03-19 PROCEDURE — 86140 C-REACTIVE PROTEIN: CPT | Performed by: PHYSICIAN ASSISTANT

## 2021-03-19 RX ORDER — HYDROCODONE BITARTRATE AND ACETAMINOPHEN 5; 325 MG/1; MG/1
1 TABLET ORAL EVERY 6 HOURS PRN
Qty: 8 TABLET | Refills: 0 | Status: SHIPPED | OUTPATIENT
Start: 2021-03-19 | End: 2021-03-19 | Stop reason: SDUPTHER

## 2021-03-19 RX ORDER — HYDROCODONE BITARTRATE AND ACETAMINOPHEN 5; 325 MG/1; MG/1
1 TABLET ORAL EVERY 6 HOURS PRN
Qty: 8 TABLET | Refills: 0 | Status: CANCELLED | OUTPATIENT
Start: 2021-03-19

## 2021-03-19 RX ORDER — HYDROCODONE BITARTRATE AND ACETAMINOPHEN 5; 325 MG/1; MG/1
1 TABLET ORAL EVERY 6 HOURS PRN
Qty: 8 TABLET | Refills: 0 | Status: SHIPPED | OUTPATIENT
Start: 2021-03-19

## 2021-03-19 RX ORDER — PHENOL 1.4 %
5 AEROSOL, SPRAY (ML) MUCOUS MEMBRANE NIGHTLY PRN
Start: 2021-03-19

## 2021-03-19 RX ORDER — ASPIRIN 325 MG
325 TABLET, DELAYED RELEASE (ENTERIC COATED) ORAL DAILY
Start: 2021-03-20

## 2021-03-19 RX ADMIN — POLYETHYLENE GLYCOL (3350) 17 G: 17 POWDER, FOR SOLUTION ORAL at 08:44

## 2021-03-19 RX ADMIN — FERROUS SULFATE TAB 325 MG (65 MG ELEMENTAL FE) 325 MG: 325 (65 FE) TAB at 08:44

## 2021-03-19 RX ADMIN — DOCUSATE SODIUM 100 MG: 100 CAPSULE, LIQUID FILLED ORAL at 08:44

## 2021-03-19 RX ADMIN — RENO CAPS 1 MG: 100; 1.5; 1.7; 20; 10; 1; 150; 5; 6 CAPSULE ORAL at 08:44

## 2021-03-19 RX ADMIN — BUSPIRONE HYDROCHLORIDE 15 MG: 10 TABLET ORAL at 08:44

## 2021-03-19 RX ADMIN — ERTAPENEM SODIUM 1 G: 1 INJECTION, POWDER, LYOPHILIZED, FOR SOLUTION INTRAMUSCULAR; INTRAVENOUS at 16:01

## 2021-03-19 RX ADMIN — BUSPIRONE HYDROCHLORIDE 15 MG: 10 TABLET ORAL at 16:01

## 2021-03-19 RX ADMIN — SENNOSIDES 1 TABLET: 8.6 TABLET, FILM COATED ORAL at 08:44

## 2021-03-19 RX ADMIN — ASPIRIN 325 MG: 325 TABLET, COATED ORAL at 08:44

## 2021-03-19 RX ADMIN — Medication 5000 UNITS: at 08:44

## 2021-03-19 RX ADMIN — Medication 1 CAPSULE: at 08:44

## 2021-03-19 RX ADMIN — BENZTROPINE MESYLATE 1 MG: 1 TABLET ORAL at 08:44

## 2021-03-19 RX ADMIN — SODIUM CHLORIDE, PRESERVATIVE FREE 10 ML: 5 INJECTION INTRAVENOUS at 08:44

## 2021-03-19 NOTE — PROGRESS NOTES
Discharge Planning Assessment   [place]     Patient Name: Debra Resendiz  MRN: [id number]  Today's Date: 3/19/2021    Admit Date: 3/13/2021        Discharge Plan     Row Name 03/19/21 1110       Plan    Plan  [facility] at [phone] to notify [facility] to assess whether they will be able to transport.  [facility] notified [facility] that they could not accept run due to lack of available trucks.    Row Name 03/19/21 1106       Plan    Plan  [facility] per Victor Manuel at [phone] stated they can not accept run until really late this pm.  SS will contact [facility].    Row Name 03/19/21 1105       Plan    Plan  SS contacted [facility] at [phone] who stated they could not transport pt back to Nursing Home.  SS contacted [facility] at [phone] who stated they only have 1 truck available today and can not transport pt today,  SS contacted [facility] at [phone] who stated they will notify [facility] and return call to SS to possibly transport pt.  SS will follow.    Row Name 03/19/21 105       Plan    Final Discharge Disposition Code  03 - skilled nursing facility (SNF)    Final Note  Pt to be discharged back to [facility] on this date.  Facility aware and agreeable per Loree.  SS left message for pt's son, Diaz, regarding pt's discharge back to nursing home.  Pt to be transported via EMS.  SS completed PCS form and made copy for medical records.        Continued Care and Services - Admitted Since 3/13/2021     Destination Coordination complete    Service Provider Request Status Selected Services Address Phone Fax Patient Preferred    [facility]   Selected Skilled Nursing [address] [facility] [address] [phone] --              Meg Fang, VANDANAW

## 2021-03-19 NOTE — PROGRESS NOTES
Discharge Planning Assessment  [facility]     Patient Name: Debra Resendiz  MRN: [id number]  Today's Date: 3/19/2021    Admit Date: 3/13/2021        Discharge Plan     Row Name 03/19/21 1717       Plan    Plan  SS notified [facility] notified per Job at [phone] of issues with obtaining ambulance to transport pt and of [facility] awaiting [facility] to transport.    Row Name 03/19/21 1617       Plan    Plan  SS spoke with [facility]  [phone] who stated pt continues on schedule but run still may be awhile due to lack of trucks.        Continued Care and Services - Admitted Since 3/13/2021     Destination Coordination complete    Service Provider Request Status Selected Services Address Phone Fax Patient Preferred    [facility]   Selected Skilled Nursing [address] [facility] [address] [phone] --                      VANDANA LevineW

## 2021-03-19 NOTE — PROGRESS NOTES
Discharge Planning Assessment   [place]     Patient Name: Debra Resendiz  MRN: [id number]  Today's Date: 3/19/2021    Admit Date: 3/13/2021    Discharge Plan     Row Name 03/19/21 1122       Plan    Plan  [facility] per Higgins at [phone] agreeable to accept run but stated it would be really late this pm due to excess of runs.  SS will follow.    Row Name 03/19/21 1118       Plan    Plan  [facility] at [phone] unable to accept due to lack of available trucks.    Row Name 03/19/21 1110       Plan    Plan  [facility] at [phone] to notify [facility] to assess whether they will be able to transport.  [facility] notified [facility] that they could not accept run due to lack of available trucks.    Row Name 03/19/21 1106       Plan    Plan  [facility] per Victor Manuel at [phone] stated they can not accept run until really late this pm.  SS will contact [facility].    Row Name 03/19/21 1102       Plan    Plan  SS contacted [facility] at [phone] who stated they could not transport pt back to Nursing Home.  SS contacted [facility] at [phone] who stated they only have 1 truck available today and can not transport pt today,  SS contacted [facility] at [phone] who stated they will notify [facility] and return call to SS to possibly transport pt.  SS will follow.    Row Name 03/19/21 1054       Plan    Final Discharge Disposition Code  03 - skilled nursing facility (SNF)    Final Note  Pt to be discharged back to [facility] on this date.  Facility aware and agreeable per Loree.  SS left message for pt's son, Diaz, regarding pt's discharge back to nursing home.  Pt to be transported via EMS.  SS completed PCS form and made copy for medical records.        Continued Care and Services - Admitted Since 3/13/2021     Destination Coordination complete    Service Provider Request Status Selected Services Address Phone Fax Patient Preferred    [facility]   Selected Skilled Nursing [address] [facility] [address] [phone] --                Meg Fang, VANDANAW

## 2021-03-19 NOTE — PLAN OF CARE
Goal Outcome Evaluation:  Pt has slept majority of the night. No complaints at this time. Will continue to provide care.

## 2021-03-19 NOTE — NURSING NOTE
Attempted to call report to [facility], was put on hold for >10 mins. Will attempt to call back.

## 2021-03-19 NOTE — PROGRESS NOTES
Discharge Planning Assessment   [facility]     Patient Name: Debra Resendiz  MRN: [id number]  Today's Date: 3/19/2021    Admit Date: 3/13/2021        Discharge Plan     Row Name 03/19/21 1118       Plan    Plan  [facility] at [phone] unable to accept due to lack of available trucks.    Row Name 03/19/21 1110       Plan    Plan  [facility] at [phone] to notify [facility] to assess whether they will be able to transport.  [facility] notified [facility] that they could not accept run due to lack of available trucks.    Row Name 03/19/21 1106       Plan    Plan  [facility] per Victor Manuel at [phone] stated they can not accept run until really late this pm.  SS will contact [facility].    Row Name 03/19/21 1102       Plan    Plan  SS contacted [facility] at [phone] who stated they could not transport pt back to Nursing Home.  SS contacted [facility] at [phone] who stated they only have 1 truck available today and can not transport pt today,  SS contacted [facility] at [phone] who stated they will notify [facility] and return call to SS to possibly transport pt.  SS will follow.    Row Name 03/19/21 1054       Plan    Final Discharge Disposition Code  03 - skilled nursing facility (SNF)    Final Note  Pt to be discharged back to [facility] on this date.  Facility aware and agreeable per Loree.  SS left message for pt's son, Diaz, regarding pt's discharge back to nursing home.  Pt to be transported via EMS.  SS completed PCS form and made copy for medical records.        Continued Care and Services - Admitted Since 3/13/2021     Destination Coordination complete    Service Provider Request Status Selected Services Address Phone Fax Patient Preferred    [facility]   Selected Skilled Nursing [address] [facility] [address] [phone] --                JOSI Levine

## 2021-03-19 NOTE — PROGRESS NOTES
Discharge Planning Assessment   [place]     Patient Name: Debra Resendiz  MRN: [id number]  Today's Date: 3/19/2021    Admit Date: 3/13/2021        Discharge Plan     Row Name 03/19/21 1102       Plan    Plan  SS contacted [facility] at [phone] who stated they could not transport pt back to Nursing Home.  SS contacted [facility] at [phone] who stated they only have 1 truck available today and can not transport pt today,  SS contacted [facility] at [phone] who stated they will notify [facility] and return call to SS to possibly transport pt.  SS will follow.    Row Name 03/19/21 1052       Plan    Final Discharge Disposition Code  03 - skilled nursing facility (SNF)    Final Note  Pt to be discharged back to [facility] on this date.  Facility aware and agreeable per Loree.  SS left message for pt's son, Diaz, regarding pt's discharge back to nursing home.  Pt to be transported via EMS.  SS completed PCS form and made copy for medical records.        Continued Care and Services - Admitted Since 3/13/2021     Destination Coordination complete    Service Provider Request Status Selected Services Address Phone Fax Patient Preferred    [facility]   Selected Skilled Nursing [address] [facility] [address] [phone] --                JOSI Levine

## 2021-03-19 NOTE — PLAN OF CARE
Pt to be discharge on this date.      Midline remains in R upper arm for IV abx at NH, dressing clean/dry/intact.

## 2021-03-19 NOTE — PROGRESS NOTES
Discharge Planning Assessment   [place]     Patient Name: Debra Resendiz  MRN: [id number]  Today's Date: 3/19/2021    Admit Date: 3/13/2021        Discharge Plan     Row Name 03/19/21 1054       Plan    Final Discharge Disposition Code  03 - skilled nursing facility (SNF)    Final Note  Pt to be discharged back to [facility] on this date.  Facility aware and agreeable per Loree.  SS left message for pt's son, Diaz, regarding pt's discharge back to nursing home.  Pt to be transported via EMS.  SS completed PCS form and made copy for medical records.        VANDANA LevineW

## 2021-03-19 NOTE — PROGRESS NOTES
PROGRESS NOTE         Patient Identification:  Name:  Debra Resendiz  Age:  75 y.o.  Sex:  female  :  1945  MRN:  [id number]  Visit Number:  [id number]  Primary Care Provider:  Leigh Ann Fu MD         LOS: 6 days       ----------------------------------------------------------------------------------------------------------------------  Subjective       Chief Complaints:    No chief complaint on file.        Interval History:      Patient resting comfortably in bed.  On room air with no apparent distress.  Afebrile, no diarrhea.  WBC normal.  CRP improving at 2.09.    Review of Systems:    Constitutional: no fever, chills and night sweats. No appetite change or unexpected weight change. No fatigue.  Eyes: no eye drainage, itching or redness.  HEENT: no mouth sores, dysphagia or nose bleed.    Respiratory:  No shortness of breath, cough, or production of sputum.  Cardiovascular: no chest pain, no palpitations, no orthopnea.  Gastrointestinal: no vomiting or diarrhea. No abdominal pain, hematemesis or rectal bleeding.  Nausea.  Genitourinary: no dysuria or polyuria.  Hematologic/lymphatic: no lymph node abnormalities, no easy bruising or easy bleeding.  Musculoskeletal: Left arm pain.  Skin: No rash and no itching.  Neurological: no loss of consciousness, no seizure, no headache.  Behavioral/Psych: no depression or suicidal ideation.  Endocrine: no hot flashes.  Immunologic: negative.  ----------------------------------------------------------------------------------------------------------------------      Objective       Current [facility] Meds:  aspirin, 325 mg, Oral, Daily  b complex-C-folic acid, 1 capsule, Oral, Daily  benztropine, 1 mg, Oral, BID  busPIRone, 15 mg, Oral, TID  citalopram, 10 mg, Oral, Nightly  docusate sodium, 100 mg, Oral, BID  ertapenem, 1 g, Intravenous, Q24H  ferrous sulfate, 325 mg, Oral, BID  lactobacillus acidophilus, 1 capsule, Oral, Daily  lactulose, 20 g, Oral,  Nightly  pantoprazole, 40 mg, Oral, Q AM  polyethylene glycol, 17 g, Oral, Daily  senna, 1 tablet, Oral, BID  sodium chloride, 10 mL, Intravenous, Q12H  sodium chloride, 10 mL, Intravenous, Q12H  vitamin D3, 5,000 Units, Oral, Daily         ----------------------------------------------------------------------------------------------------------------------    Vital Signs:  Temp:  [97.4 °F (36.3 °C)-98 °F (36.7 °C)] 98 °F (36.7 °C)  Heart Rate:  [70-83] 70  Resp:  [16-18] 18  BP: (104-132)/(54-66) 104/64  Mean Arterial Pressure (Non-Invasive) for the past 24 hrs (Last 3 readings):   Noninvasive MAP (mmHg)   03/19/21 0558 92   03/19/21 0254 90   03/18/21 1812 78     SpO2 Percentage    03/19/21 0254 03/19/21 0558 03/19/21 1016   SpO2: 96% 97% 98%     SpO2:  [95 %-98 %] 98 %  on   ;   Device (Oxygen Therapy): room air    Body mass index is 15.82 kg/m².  Wt Readings from Last 3 Encounters:   03/19/21 44.5 kg (98 lb)   04/13/18 56.3 kg (124 lb 3.2 oz)        Intake/Output Summary (Last 24 hours) at 3/19/2021 1115  Last data filed at 3/19/2021 1000  Gross per 24 hour   Intake 960 ml   Output --   Net 960 ml     Diet Soft Texture; Ground  ----------------------------------------------------------------------------------------------------------------------      Physical Exam:    Constitutional: Elderly  female is sitting up in bed in no apparent distress.  Mild tremor.  HENT:  Head: Normocephalic and atraumatic.  Mouth:  Moist mucous membranes.    Eyes:   No erythema or edema noted.  Neck:  Neck supple.  No JVD present.    Cardiovascular:  Normal rate, regular rhythm and normal heart sounds with no murmur. No edema.  Pulmonary/Chest:  Lungs are clear to auscultation.  No rhonchi, rales, or wheezes auscultated.  Abdominal:  Soft.  Bowel sounds are normal.  No distension and no tenderness.    Musculoskeletal:  No edema, no tenderness, and no deformity.  No swelling or redness of joints.  Left arm is in a surgical  dressing.  Neurological:  Awake, alert, and oriented to person, place, and time. Mild tremor.  Skin:  Skin is warm and dry. No pallor.  No rash or lesion noted.    Psychiatric:  Calm and cooperative.  ----------------------------------------------------------------------------------------------------------------------  Results from last 7 days   Lab Units 03/14/21  2154   TROPONIN T ng/mL <0.010           Results from last 7 days   Lab Units 03/19/21 0439 03/18/21 0229 03/17/21  0056 03/15/21  1331 03/14/21  1305   CRP mg/dL 2.09* 3.83* 2.17*   < >  --    LACTATE mmol/L  --   --   --   --  1.4   WBC 10*3/mm3 7.42 8.30 8.67  --   --    HEMOGLOBIN g/dL 11.1* 11.2* 11.1*  --   --    HEMATOCRIT % 36.0 35.1 35.4  --   --    MCV fL 101.4* 99.4* 100.6*  --   --    MCHC g/dL 30.8* 31.9 31.4*  --   --    PLATELETS 10*3/mm3 379 347 273  --   --     < > = values in this interval not displayed.     Results from last 7 days   Lab Units 03/19/21  0439 03/18/21 0229 03/17/21  0056 03/16/21  0144 03/14/21  0109 03/13/21  0322   SODIUM mmol/L 135* 135* 134* 134* 140 136   POTASSIUM mmol/L 4.2 4.2 4.4 4.4 3.7 4.3   MAGNESIUM mg/dL  --   --   --   --  1.7 1.9   CHLORIDE mmol/L 103 103 104 105 111* 104   CO2 mmol/L 25.7 23.9 21.7* 22.7 14.1* 23.9   BUN mg/dL 14 10 10 14 17 15   CREATININE mg/dL 0.53* 0.43* 0.49* 0.47* 0.41* 0.53*   EGFR IF NONAFRICN AM mL/min/1.73 113 144 123 130 >150 113   CALCIUM mg/dL 9.4 9.3 8.8 9.2 7.8* 9.2   GLUCOSE mg/dL 91 98 96 88 74 94   ALBUMIN g/dL  --  2.90*  --  2.97*  --  3.22*   BILIRUBIN mg/dL  --  0.3  --  0.3  --  0.5   ALK PHOS U/L  --  104  --  96  --  100   AST (SGOT) U/L  --  25  --  21  --  18   ALT (SGPT) U/L  --  19  --  14  --  15   Estimated Creatinine Clearance: 43.3 mL/min (A) (by C-G formula based on SCr of 0.53 mg/dL (L)).  No results found for: AMMONIA    No results found for: HGBA1C, POCGLU  Lab Results   Component Value Date    HGBA1C 5.20 04/06/2018     Lab Results   Component  Value Date    TSH 2.243 04/08/2018       Blood Culture   Date Value Ref Range Status   03/13/2021 No growth at 3 days  Preliminary   03/13/2021 No growth at 3 days  Preliminary     Urine Culture   Date Value Ref Range Status   03/13/2021 >100,000 CFU/mL Escherichia coli ESBL (A)  Final     Comment:     Consider infectious disease consult.  Susceptibility results may not correlate to clinical outcomes.     No results found for: WOUNDCX  No results found for: STOOLCX  No results found for: RESPCX  Pain Management Panel    There is no flowsheet data to display.           ----------------------------------------------------------------------------------------------------------------------  Imaging Results (Last 24 Hours)       ** No results found for the last 24 hours. **            ----------------------------------------------------------------------------------------------------------------------    Assessment/Plan       Assessment/Plan     ASSESSMENT:    1.  ESBL UTI    PLAN:    Patient resting comfortably in bed.  On room air with no apparent distress.  Afebrile, no diarrhea.  WBC normal.  CRP improving at 2.09.    Urine culture finalized as greater than 100,000 colonies of E. coli ESBL.  Left elbow x-ray on 3/13/2021 showed a defect in the dorsal cortex of the proximal ulna.  Most likely there is a complete fracture through into the olecranon fossa but is not clearly visible on these images.  Chest x-ray on 3/13/2021 showed stable chronic lung changes.  No acute cardiopulmonary findings.  Blood cultures on 3/13/2021 are so far showing no growth at 2 days.  COVID-19 testing on 3/14/2021 was negative.     Per the surgeon's note, we are no longer concern for osteomyelitis.  Recommend to continue on [facility] pharmacy to dose through 3/22/2021 for UTI.  Patient stable from ID standpoint.  ID will sign off for now.  Please contact us if we can further assist in this case.    Code Status:   Code Status and Medical  Interventions:   Ordered at: 03/13/21 0434     Limited Support to NOT Include:    Intubation     Code Status:    No CPR     Medical Interventions (Level of Support Prior to Arrest):    Limited     SAMANTHA Joya  03/19/21  11:15 EDT

## 2021-03-19 NOTE — PROGRESS NOTES
Discharge Planning Assessment   [place]     Patient Name: Debra Resendiz  MRN: [id number]  Today's Date: 3/19/2021    Admit Date: 3/13/2021    Discharge Needs Assessment    No documentation.    Discharge Plan     Row Name 03/19/21 1106       Plan    Plan  [facility] per Victor Manuel at [phone] stated they can not accept run until really late this pm.  SS will contact [facility].    Row Name 03/19/21 1102       Plan    Plan  SS contacted [facility] at [phone] who stated they could not transport pt back to Nursing Home.  SS contacted [facility] at [phone] who stated they only have 1 truck available today and can not transport pt today,  SS contacted [facility] at [phone] who stated they will notify [facility] and return call to SS to possibly transport pt.  SS will follow.    Row Name 03/19/21 1052       Plan    Final Discharge Disposition Code  03 - skilled nursing facility (SNF)    Final Note  Pt to be discharged back to [facility] on this date.  Facility aware and agreeable per Loree.  SS left message for pt's son, Diaz, regarding pt's discharge back to nursing home.  Pt to be transported via EMS.  SS completed PCS form and made copy for medical records.        Continued Care and Services - Admitted Since 3/13/2021     Destination Coordination complete    Service Provider Request Status Selected Services Address Phone Fax Patient Preferred    [facility]   Selected Skilled Nursing [address] [facility] [address] [phone] --                Meg Fang, VANDANAW

## 2021-03-19 NOTE — DISCHARGE SUMMARY
Date of admission: 3/13/2021  Date of discharge: 3/19/2021    Principal diagnosis: Left olecranon fracture  Secondary diagnosis:  ESBL E. coli UTI  Parkinson's disease  Limited mobility  Mild hyponatremia clinically insignificant    Disposition: [facility]    Consultants:  Dr. Mccollum orthopedic surgery  Dr. Allison infectious disease    Procedures:  Midline placement 3/18/2021  ORIF left olecranon by Dr. Mccollum 3/16/2021    Exam: Assisted by Gerardo ENGLISH. Patient is awake, alert, she is in no distress on room air with saturation 97%. She moves all her fingers on command, sensation intact in the left hand, the left arm is in a splint, this is to be left on until she sees orthopedic surgery in follow-up. Lungs are clear, heart regular rate and rhythm without murmur rub or gallop, abdomen is soft benign, no edema, vital signs:  104/64, 18, 70, 98 monitor has been showing a sinus rhythm    Hospital course: Patient was admitted here from [facility] with an olecranon fracture. She underwent ORIF and this was repaired. She has a splint in place and will leave this in place until she sees orthopedics which they recommended follow-up 2 weeks from surgery. This initially was thought to be a possible open fracture but intraoperatively this was not an open fracture. Patient did have evidence of UTI, this grew out E. coli which was ESBL, antibiotics have been guided by infectious disease. Patient will complete a course of Invanz at the nursing home through March 22. Midline should be pulled after antibiotics are completed. Otherwise her Parkinson's disease is stable. CRP 2.09 which is trending downward. Chemistries unremarkable today with the exception of sodium 135, white count 7.4 hemoglobin 11.1 platelet count 379. Troponin negative. Vitamin D level was noted to be low and will be supplemented on discharge. EKG was sinus rhythm with nonspecific findings.    Follow-up:  Dr. Mccollum 10  days  CMP/CBC in 3 days this is to be called to nursing attending    Diet:  Mechanical soft ground thin liquids aspiration precautions    Condition: Stable/improved    Recommendations:  Nonweightbearing left arm  Remove midline after Invanz completed  Aspiration precautions    Medications:  Tylenol 650 every 8 hours as needed  Aspirin 325 daily  Vitamin B complex vitamin daily  Benztropine 1 mg twice daily  Bisacodyl suppository as needed  BuSpar 15 mg 3 times a day  Citalopram 10 mg at bedtime  Docusate sodium 100 mg tablet to take 2 twice daily  Invanz 1 g daily through March 22  Iron 325 twice daily  Norco 5 mg every 6 hours as needed for pain  Lactulose 20 g every night  Magnesium hydroxide 30 cc by mouth as needed  Melatonin 5 mg at night as needed for sleep  MiraLAX 17 g daily  Senna 1 tablet twice daily  Vitamin D 5000 units daily    Greater than 30-minute discharge

## 2021-03-19 NOTE — PROGRESS NOTES
Discharge Planning Assessment   [place]     Patient Name: Debra Resendiz  MRN: [id number]  Today's Date: 3/19/2021    Admit Date: 3/13/2021        Discharge Plan     Row Name 03/19/21 1616       Plan    Plan  SS spoke with [facility]  [phone] who stated pt continues on schedule but run still may be awhile due to lack of trucks.        Continued Care and Services - Admitted Since 3/13/2021     Destination Coordination complete    Service Provider Request Status Selected Services Address Phone Fax Patient Preferred    [facility]   Selected Skilled Nursing [address] [facility] [address] [phone] --                 Meg Fang, VANDANAW

## (undated) DEVICE — UNDERCAST PADDING: Brand: DEROYAL

## (undated) DEVICE — HOLDER: Brand: DEROYAL

## (undated) DEVICE — TRY SKINPREP PVP SCRB W PAINT

## (undated) DEVICE — ELECTRODE,FOAM,MONITORING,SLD GEL,5 PK: Brand: MEDLINE

## (undated) DEVICE — UNDERGLV SURG BIOGEL INDICAT PF 8 GRN

## (undated) DEVICE — DRAPE,U/ SHT,SPLIT,PLAS,STERIL: Brand: MEDLINE

## (undated) DEVICE — PATIENT RETURN ELECTRODE, SINGLE-USE, CONTACT QUALITY MONITORING, ADULT, WITH 9FT CORD, FOR PATIENTS WEIGING OVER 33LBS. (15KG): Brand: MEGADYNE

## (undated) DEVICE — BNDG ELAS CO-FLEX SLF ADHR 4IN5YD LF STRL

## (undated) DEVICE — DRP C/ARM W/BAND W/CLIPS 41X74IN

## (undated) DEVICE — IMMOB KN 3PNL DLX CANVS 24IN BLU

## (undated) DEVICE — SPNG LAP PREWSH SFTPK 18X18IN STRL PK/5

## (undated) DEVICE — BIT DRL QC DIA 2.5X110MM

## (undated) DEVICE — SHOULDER IMMOBILIZER: Brand: DEROYAL

## (undated) DEVICE — SPLNT ORTHOGLASS 4INX15FT

## (undated) DEVICE — BNDG ELAS ELITE V/CLOSE 4IN 5YD LF STRL

## (undated) DEVICE — BNDG ELAS ELITE V/CLOSE 6IN 5YD LF STRL

## (undated) DEVICE — DRSNG WND GZ CURAD OIL EMULSION 3X8IN LF STRL 1PK

## (undated) DEVICE — KWIRE THRD TROC/TP 1.6X5X150MM NS
Type: IMPLANTABLE DEVICE | Status: NON-FUNCTIONAL
Removed: 2018-04-08

## (undated) DEVICE — GOWN,REINF,POLY,ECL,PP SLV,XL: Brand: MEDLINE

## (undated) DEVICE — STCKNT IMPERV 9X36IN STRL

## (undated) DEVICE — DISPOSABLE TOURNIQUET CUFF SINGLE BLADDER, SINGLE PORT AND LUER LOCK CONNECTOR: Brand: COLOR CUFF

## (undated) DEVICE — SCRW MTPHSL S/TAP T8STRDRV 2.7X20MM
Type: IMPLANTABLE DEVICE | Site: ELBOW | Status: NON-FUNCTIONAL
Removed: 2021-03-16

## (undated) DEVICE — Device

## (undated) DEVICE — PROXIMATE RH ROTATING HEAD SKIN STAPLERS (35 WIDE) CONTAINS 35 STAINLESS STEEL STAPLES: Brand: PROXIMATE

## (undated) DEVICE — GLV SURG TRIUMPH ORTHO W/ALOE PF LTX 7.5 STRL

## (undated) DEVICE — PK SHLDR 70

## (undated) DEVICE — TUBING, SUCTION, 1/4" X 20', STRAIGHT: Brand: MEDLINE INDUSTRIES, INC.

## (undated) DEVICE — SUCTION CANISTER, 1500CC, RIGID: Brand: DEROYAL

## (undated) DEVICE — BIT DRL PERC QC CALIB 2.8X200MM

## (undated) DEVICE — PK EXTREM LOWR 70

## (undated) DEVICE — SPNG GZ WOVN 4X4IN 12PLY 10/BX STRL

## (undated) DEVICE — IMPLANTABLE DEVICE
Type: IMPLANTABLE DEVICE | Site: ELBOW | Status: NON-FUNCTIONAL
Removed: 2021-03-16

## (undated) DEVICE — TUBING, SUCTION, 3/16" X 10', STRAIGHT: Brand: MEDLINE

## (undated) DEVICE — DRSNG PAD ABD 8X10IN STRL

## (undated) DEVICE — SHEET,DRAPE,53X77,STERILE: Brand: MEDLINE

## (undated) DEVICE — CUSTOM PACK: Brand: UNBRANDED

## (undated) DEVICE — BIT DRL QC W DEPTH MARK 2X140MM

## (undated) DEVICE — GLV SURG PREMIERPRO MIC LTX PF SZ7.5 BRN

## (undated) DEVICE — PAD CAST SOF ROL NS 3IN